# Patient Record
Sex: FEMALE | Race: WHITE | NOT HISPANIC OR LATINO | Employment: UNEMPLOYED | URBAN - METROPOLITAN AREA
[De-identification: names, ages, dates, MRNs, and addresses within clinical notes are randomized per-mention and may not be internally consistent; named-entity substitution may affect disease eponyms.]

---

## 2017-01-05 ENCOUNTER — APPOINTMENT (EMERGENCY)
Dept: RADIOLOGY | Facility: HOSPITAL | Age: 42
End: 2017-01-05
Payer: MEDICARE

## 2017-01-05 ENCOUNTER — HOSPITAL ENCOUNTER (EMERGENCY)
Facility: HOSPITAL | Age: 42
Discharge: HOME | End: 2017-01-05
Attending: EMERGENCY MEDICINE | Admitting: EMERGENCY MEDICINE
Payer: MEDICARE

## 2017-01-05 VITALS
WEIGHT: 188 LBS | RESPIRATION RATE: 20 BRPM | OXYGEN SATURATION: 99 % | HEIGHT: 67 IN | BODY MASS INDEX: 29.51 KG/M2 | SYSTOLIC BLOOD PRESSURE: 145 MMHG | HEART RATE: 91 BPM | DIASTOLIC BLOOD PRESSURE: 88 MMHG | TEMPERATURE: 98.9 F

## 2017-01-05 DIAGNOSIS — M54.50 LOW BACK PAIN: Primary | ICD-10-CM

## 2017-01-05 PROCEDURE — 72100 X-RAY EXAM L-S SPINE 2/3 VWS: CPT

## 2017-01-05 PROCEDURE — 99283 EMERGENCY DEPT VISIT LOW MDM: CPT

## 2017-01-05 RX ORDER — NAPROXEN 500 MG/1
500 TABLET ORAL
Qty: 60 TABLET | Refills: 0 | Status: SHIPPED | OUTPATIENT
Start: 2017-01-05 | End: 2018-04-23

## 2017-01-05 RX ORDER — MORPHINE SULFATE 15 MG/1
15 TABLET, FILM COATED, EXTENDED RELEASE ORAL 2 TIMES DAILY
COMMUNITY
End: 2018-04-23

## 2017-01-05 RX ORDER — OXYCODONE AND ACETAMINOPHEN 7.5; 325 MG/1; MG/1
1 TABLET ORAL EVERY 4 HOURS PRN
COMMUNITY
End: 2018-04-23

## 2017-01-05 RX ORDER — RIZATRIPTAN BENZOATE 10 MG/1
10 TABLET ORAL ONCE AS NEEDED
COMMUNITY
End: 2018-04-23

## 2017-01-05 RX ORDER — LIDOCAINE 50 MG/G
1 PATCH TOPICAL ONCE
Status: DISCONTINUED | OUTPATIENT
Start: 2017-01-05 | End: 2017-01-05 | Stop reason: HOSPADM

## 2017-01-05 RX ORDER — NAPROXEN 500 MG/1
500 TABLET ORAL ONCE
Status: COMPLETED | OUTPATIENT
Start: 2017-01-05 | End: 2017-01-05

## 2017-01-05 RX ORDER — IBUPROFEN 800 MG/1
800 TABLET ORAL EVERY 8 HOURS PRN
COMMUNITY
End: 2019-02-24

## 2017-01-05 RX ORDER — TRAZODONE HYDROCHLORIDE 100 MG/1
100 TABLET ORAL
COMMUNITY
End: 2019-04-12 | Stop reason: SDUPTHER

## 2017-01-05 RX ORDER — TIZANIDINE HYDROCHLORIDE 4 MG/1
4 CAPSULE, GELATIN COATED ORAL 3 TIMES DAILY
COMMUNITY
End: 2018-04-23

## 2017-01-05 RX ADMIN — NAPROXEN 500 MG: 500 TABLET ORAL at 14:29

## 2017-08-12 ENCOUNTER — HOSPITAL ENCOUNTER (EMERGENCY)
Facility: HOSPITAL | Age: 42
Discharge: HOME | End: 2017-08-12
Attending: EMERGENCY MEDICINE | Admitting: EMERGENCY MEDICINE
Payer: MEDICARE

## 2017-08-12 VITALS
HEART RATE: 101 BPM | HEIGHT: 67 IN | WEIGHT: 166 LBS | RESPIRATION RATE: 18 BRPM | BODY MASS INDEX: 26.06 KG/M2 | SYSTOLIC BLOOD PRESSURE: 134 MMHG | DIASTOLIC BLOOD PRESSURE: 72 MMHG | OXYGEN SATURATION: 99 % | TEMPERATURE: 98.7 F

## 2017-08-12 DIAGNOSIS — K08.89 TOOTH PAIN: ICD-10-CM

## 2017-08-12 DIAGNOSIS — R51.9 FACIAL PAIN: Primary | ICD-10-CM

## 2017-08-12 DIAGNOSIS — H92.09 EAR PAIN: ICD-10-CM

## 2017-08-12 PROCEDURE — 96372 THER/PROPH/DIAG INJ SC/IM: CPT

## 2017-08-12 PROCEDURE — 99283 EMERGENCY DEPT VISIT LOW MDM: CPT

## 2017-08-12 RX ORDER — CLINDAMYCIN HYDROCHLORIDE 150 MG/1
450 CAPSULE ORAL ONCE
Status: COMPLETED | OUTPATIENT
Start: 2017-08-12 | End: 2017-08-12

## 2017-08-12 RX ORDER — CLINDAMYCIN HYDROCHLORIDE 150 MG/1
450 CAPSULE ORAL EVERY 8 HOURS SCHEDULED
Qty: 90 CAPSULE | Refills: 0 | Status: SHIPPED | OUTPATIENT
Start: 2017-08-12 | End: 2017-08-22

## 2017-08-12 RX ORDER — KETOROLAC TROMETHAMINE 30 MG/ML
15 INJECTION, SOLUTION INTRAMUSCULAR; INTRAVENOUS ONCE
Status: COMPLETED | OUTPATIENT
Start: 2017-08-12 | End: 2017-08-12

## 2017-08-12 RX ORDER — CIPROFLOXACIN AND DEXAMETHASONE 3; 1 MG/ML; MG/ML
4 SUSPENSION/ DROPS AURICULAR (OTIC) 2 TIMES DAILY
Status: DISCONTINUED | OUTPATIENT
Start: 2017-08-12 | End: 2017-08-12 | Stop reason: HOSPADM

## 2017-08-12 RX ADMIN — CIPROFLOXACIN AND DEXAMETHASONE 4 DROP: 3; 1 SUSPENSION/ DROPS AURICULAR (OTIC) at 16:38

## 2017-08-12 RX ADMIN — KETOROLAC TROMETHAMINE 15 MG: 30 INJECTION, SOLUTION INTRAMUSCULAR at 16:12

## 2017-08-12 RX ADMIN — CLINDAMYCIN HYDROCHLORIDE 450 MG: 150 CAPSULE ORAL at 16:13

## 2017-10-09 ENCOUNTER — APPOINTMENT (EMERGENCY)
Dept: RADIOLOGY | Facility: HOSPITAL | Age: 42
End: 2017-10-09
Payer: MEDICARE

## 2017-10-09 ENCOUNTER — HOSPITAL ENCOUNTER (EMERGENCY)
Facility: HOSPITAL | Age: 42
Discharge: HOME | End: 2017-10-09
Attending: EMERGENCY MEDICINE
Payer: MEDICARE

## 2017-10-09 VITALS
TEMPERATURE: 97.4 F | HEART RATE: 88 BPM | DIASTOLIC BLOOD PRESSURE: 60 MMHG | RESPIRATION RATE: 16 BRPM | SYSTOLIC BLOOD PRESSURE: 104 MMHG | BODY MASS INDEX: 26 KG/M2 | WEIGHT: 166 LBS | OXYGEN SATURATION: 99 %

## 2017-10-09 DIAGNOSIS — K52.9 COLITIS: Primary | ICD-10-CM

## 2017-10-09 DIAGNOSIS — R10.9 ABDOMINAL PAIN: ICD-10-CM

## 2017-10-09 LAB
ALBUMIN SERPL BCP-MCNC: 3.5 G/DL (ref 3.5–5)
ALP SERPL-CCNC: 65 U/L (ref 46–116)
ALT SERPL W P-5'-P-CCNC: 14 U/L (ref 12–78)
AMORPH URATE CRY URNS QL MICRO: ABNORMAL /HPF
ANION GAP SERPL CALCULATED.3IONS-SCNC: 6 MMOL/L (ref 4–13)
APTT PPP: 32 SECONDS (ref 23–35)
AST SERPL W P-5'-P-CCNC: 11 U/L (ref 5–45)
BACTERIA UR QL AUTO: ABNORMAL /HPF
BASOPHILS # BLD AUTO: 0.02 THOUSANDS/ΜL (ref 0–0.1)
BASOPHILS NFR BLD AUTO: 0 % (ref 0–1)
BILIRUB SERPL-MCNC: 0.43 MG/DL (ref 0.2–1)
BILIRUB UR QL STRIP: ABNORMAL
BUN SERPL-MCNC: 18 MG/DL (ref 5–25)
CALCIUM SERPL-MCNC: 8.5 MG/DL (ref 8.3–10.1)
CHLORIDE SERPL-SCNC: 110 MMOL/L (ref 100–108)
CLARITY UR: CLEAR
CO2 SERPL-SCNC: 25 MMOL/L (ref 21–32)
COLOR UR: ABNORMAL
COLOR, POC: NORMAL
CREAT SERPL-MCNC: 0.83 MG/DL (ref 0.6–1.3)
EOSINOPHIL # BLD AUTO: 0.11 THOUSAND/ΜL (ref 0–0.61)
EOSINOPHIL NFR BLD AUTO: 1 % (ref 0–6)
ERYTHROCYTE [DISTWIDTH] IN BLOOD BY AUTOMATED COUNT: 14 % (ref 11.6–15.1)
GFR SERPL CREATININE-BSD FRML MDRD: 86 ML/MIN/1.73SQ M
GLUCOSE SERPL-MCNC: 85 MG/DL (ref 65–140)
GLUCOSE UR STRIP-MCNC: NEGATIVE MG/DL
HCT VFR BLD AUTO: 43.5 % (ref 34.8–46.1)
HGB BLD-MCNC: 14.6 G/DL (ref 11.5–15.4)
HGB UR QL STRIP.AUTO: ABNORMAL
INR PPP: 1.02 (ref 0.86–1.16)
KETONES UR STRIP-MCNC: NEGATIVE MG/DL
LEUKOCYTE ESTERASE UR QL STRIP: NEGATIVE
LIPASE SERPL-CCNC: 264 U/L (ref 73–393)
LYMPHOCYTES # BLD AUTO: 3.3 THOUSANDS/ΜL (ref 0.6–4.47)
LYMPHOCYTES NFR BLD AUTO: 31 % (ref 14–44)
MCH RBC QN AUTO: 31.9 PG (ref 26.8–34.3)
MCHC RBC AUTO-ENTMCNC: 33.6 G/DL (ref 31.4–37.4)
MCV RBC AUTO: 95 FL (ref 82–98)
MONOCYTES # BLD AUTO: 0.54 THOUSAND/ΜL (ref 0.17–1.22)
MONOCYTES NFR BLD AUTO: 5 % (ref 4–12)
NEUTROPHILS # BLD AUTO: 6.71 THOUSANDS/ΜL (ref 1.85–7.62)
NEUTS SEG NFR BLD AUTO: 63 % (ref 43–75)
NITRITE UR QL STRIP: NEGATIVE
NON-SQ EPI CELLS URNS QL MICRO: ABNORMAL /HPF
NRBC BLD AUTO-RTO: 0 /100 WBCS
PH UR STRIP.AUTO: 5.5 [PH] (ref 4.5–8)
PLATELET # BLD AUTO: 225 THOUSANDS/UL (ref 149–390)
PMV BLD AUTO: 9.6 FL (ref 8.9–12.7)
POTASSIUM SERPL-SCNC: 3.7 MMOL/L (ref 3.5–5.3)
PROT SERPL-MCNC: 7.1 G/DL (ref 6.4–8.2)
PROT UR STRIP-MCNC: ABNORMAL MG/DL
PROTHROMBIN TIME: 13.4 SECONDS (ref 12.1–14.4)
RBC # BLD AUTO: 4.58 MILLION/UL (ref 3.81–5.12)
RBC #/AREA URNS AUTO: ABNORMAL /HPF
SODIUM SERPL-SCNC: 141 MMOL/L (ref 136–145)
SP GR UR STRIP.AUTO: >=1.03 (ref 1–1.03)
UROBILINOGEN UR QL STRIP.AUTO: 0.2 E.U./DL
WBC # BLD AUTO: 10.71 THOUSAND/UL (ref 4.31–10.16)
WBC #/AREA URNS AUTO: ABNORMAL /HPF

## 2017-10-09 PROCEDURE — 83690 ASSAY OF LIPASE: CPT | Performed by: EMERGENCY MEDICINE

## 2017-10-09 PROCEDURE — 74177 CT ABD & PELVIS W/CONTRAST: CPT

## 2017-10-09 PROCEDURE — 80053 COMPREHEN METABOLIC PANEL: CPT | Performed by: EMERGENCY MEDICINE

## 2017-10-09 PROCEDURE — 85730 THROMBOPLASTIN TIME PARTIAL: CPT | Performed by: EMERGENCY MEDICINE

## 2017-10-09 PROCEDURE — 96360 HYDRATION IV INFUSION INIT: CPT

## 2017-10-09 PROCEDURE — 96361 HYDRATE IV INFUSION ADD-ON: CPT

## 2017-10-09 PROCEDURE — 36415 COLL VENOUS BLD VENIPUNCTURE: CPT | Performed by: EMERGENCY MEDICINE

## 2017-10-09 PROCEDURE — 81001 URINALYSIS AUTO W/SCOPE: CPT

## 2017-10-09 PROCEDURE — 99285 EMERGENCY DEPT VISIT HI MDM: CPT

## 2017-10-09 PROCEDURE — 85025 COMPLETE CBC W/AUTO DIFF WBC: CPT | Performed by: EMERGENCY MEDICINE

## 2017-10-09 PROCEDURE — 85610 PROTHROMBIN TIME: CPT | Performed by: EMERGENCY MEDICINE

## 2017-10-09 PROCEDURE — 94640 AIRWAY INHALATION TREATMENT: CPT

## 2017-10-09 PROCEDURE — 82272 OCCULT BLD FECES 1-3 TESTS: CPT

## 2017-10-09 PROCEDURE — 81002 URINALYSIS NONAUTO W/O SCOPE: CPT | Performed by: EMERGENCY MEDICINE

## 2017-10-09 RX ORDER — OXYCODONE HYDROCHLORIDE AND ACETAMINOPHEN 5; 325 MG/1; MG/1
1 TABLET ORAL ONCE
Status: COMPLETED | OUTPATIENT
Start: 2017-10-09 | End: 2017-10-09

## 2017-10-09 RX ORDER — DICYCLOMINE HCL 20 MG
20 TABLET ORAL EVERY 6 HOURS
Qty: 12 TABLET | Refills: 0 | Status: SHIPPED | OUTPATIENT
Start: 2017-10-09 | End: 2018-04-23

## 2017-10-09 RX ORDER — DICYCLOMINE HCL 20 MG
20 TABLET ORAL ONCE
Status: COMPLETED | OUTPATIENT
Start: 2017-10-09 | End: 2017-10-09

## 2017-10-09 RX ORDER — ALBUTEROL SULFATE 2.5 MG/3ML
5 SOLUTION RESPIRATORY (INHALATION) ONCE
Status: COMPLETED | OUTPATIENT
Start: 2017-10-09 | End: 2017-10-09

## 2017-10-09 RX ADMIN — DICYCLOMINE HYDROCHLORIDE 20 MG: 20 TABLET ORAL at 18:24

## 2017-10-09 RX ADMIN — IOHEXOL 100 ML: 350 INJECTION, SOLUTION INTRAVENOUS at 19:46

## 2017-10-09 RX ADMIN — SODIUM CHLORIDE 1000 ML: 0.9 INJECTION, SOLUTION INTRAVENOUS at 18:15

## 2017-10-09 RX ADMIN — IPRATROPIUM BROMIDE 0.5 MG: 0.5 SOLUTION RESPIRATORY (INHALATION) at 18:35

## 2017-10-09 RX ADMIN — OXYCODONE HYDROCHLORIDE AND ACETAMINOPHEN 1 TABLET: 5; 325 TABLET ORAL at 20:33

## 2017-10-09 RX ADMIN — ALBUTEROL SULFATE 5 MG: 2.5 SOLUTION RESPIRATORY (INHALATION) at 18:35

## 2017-10-09 NOTE — ED PROVIDER NOTES
History  Chief Complaint   Patient presents with    Rectal Bleeding     BRBPR after BM's since saturday     15-year-old female presents for evaluation of abdominal pain and rectal bleeding  Patient states that 2 days ago she had very hard stools which she had to strain to pass, after which, she developed bright red blood per rectum  Patient states that since then she has had 6-7 loose, bloody stools daily  Hematochezia seemed to have resolved today, but she has continued to have multiple loose stools  She states that she had initially had epigastric pain beginning 3 days ago, described as a tight sensation; however, patient's pain migrated to affect the lower abdomen bilaterally and radiating in a belt like fashion around her back today  She describes the lower abdominal pain as a tightness as well  She states that she is afraid to eat because this seems to exacerbate her pain  Patient states that she has had very little oral intake today  She reports chills for 3 days with subjective fever which seemed to result today as well  Patient has history of pulmonary nodules and smokes 1 ppd since the age of 13  She had been feeling short of breath, but states this has improved while lying down          History provided by:  Patient  Abdominal Pain   Pain location:  Epigastric, suprapubic, LLQ and RLQ  Pain radiates to:  Back  Pain severity:  Severe  Onset quality:  Gradual  Duration:  3 days  Timing:  Constant  Progression:  Waxing and waning  Chronicity:  New  Context: eating and previous surgery (hysterectomy)    Context: not diet changes, not recent illness, not retching and not trauma    Relieved by:  None tried  Worsened by:  Eating  Ineffective treatments:  None tried  Associated symptoms: chills, diarrhea (6-7 loose stools daily), fever (subjective) and shortness of breath    Associated symptoms: no chest pain, no constipation, no cough, no dysuria, no hematuria, no nausea, no sore throat and no vomiting Shortness of breath:     Severity:  Mild    Onset quality:  Unable to specify    Timing:  Intermittent    Progression:  Partially resolved  Risk factors: obesity        Prior to Admission Medications   Prescriptions Last Dose Informant Patient Reported? Taking? TiZANidine (ZANAFLEX) 4 MG capsule   Yes No   Sig: Take 4 mg by mouth 3 (three) times a day   ibuprofen (MOTRIN) 800 mg tablet   Yes No   Sig: Take 800 mg by mouth every 8 (eight) hours as needed for mild pain   morphine (MS CONTIN) 15 mg 12 hr tablet   Yes No   Sig: Take 15 mg by mouth 2 (two) times a day   naproxen (NAPROSYN) 500 mg tablet   No No   Sig: Take 1 tablet by mouth 3 (three) times a day with meals for 30 days   oxyCODONE-acetaminophen (PERCOCET) 7 5-325 MG per tablet   Yes No   Sig: Take 1 tablet by mouth every 4 (four) hours as needed for moderate pain   rizatriptan (MAXALT) 10 MG tablet   Yes No   Sig: Take 10 mg by mouth once as needed for migraine May repeat in 2 hours if needed   traZODone (DESYREL) 100 mg tablet   Yes No   Sig: Take 100 mg by mouth daily at bedtime Take two tablets daily at bedtime  Facility-Administered Medications: None       Past Medical History:   Diagnosis Date    Back pain     Chronic pain     Migraine        History reviewed  No pertinent surgical history  History reviewed  No pertinent family history  I have reviewed and agree with the history as documented  Social History   Substance Use Topics    Smoking status: Current Every Day Smoker     Packs/day: 1 00     Types: Cigarettes    Smokeless tobacco: Never Used    Alcohol use Yes      Comment: socially         Review of Systems   Constitutional: Positive for appetite change, chills and fever (subjective)  HENT: Negative for congestion, rhinorrhea and sore throat  Respiratory: Positive for shortness of breath  Negative for cough and chest tightness  Cardiovascular: Negative for chest pain, palpitations and leg swelling  Gastrointestinal: Positive for abdominal pain and diarrhea (6-7 loose stools daily)  Negative for constipation, nausea and vomiting  Genitourinary: Negative for dysuria, frequency and hematuria  Musculoskeletal: Negative for myalgias, neck pain and neck stiffness  Skin: Negative for pallor and rash  Neurological: Negative for syncope, weakness and headaches  All other systems reviewed and are negative  Physical Exam  ED Triage Vitals [10/09/17 1742]   Temperature Pulse Respirations Blood Pressure SpO2   (!) 97 4 °F (36 3 °C) 103 16 123/70 96 %      Temp Source Heart Rate Source Patient Position - Orthostatic VS BP Location FiO2 (%)   Oral -- -- -- --      Pain Score       6           Physical Exam   Constitutional: She is oriented to person, place, and time  She appears well-developed and well-nourished  Non-toxic appearance  No distress  HENT:   Head: Normocephalic and atraumatic  Eyes: Conjunctivae and EOM are normal  Pupils are equal, round, and reactive to light  Neck: Normal range of motion  Neck supple  No tracheal deviation present  No thyromegaly present  Cardiovascular: Normal rate, regular rhythm, normal heart sounds and intact distal pulses  Pulmonary/Chest: Effort normal  She has wheezes in the right upper field and the right middle field  Abdominal: Soft  Bowel sounds are normal  She exhibits no distension  There is generalized tenderness  There is no rigidity, no rebound, no guarding, no CVA tenderness, no tenderness at McBurney's point and negative Ochoa's sign  Rectal tenderness on exam  No palpable internal hemorrhoid  No visible external fissure or hemorrhoid  Genitourinary: Rectal exam shows guaiac positive stool  Musculoskeletal: She exhibits no edema  Lymphadenopathy:     She has no cervical adenopathy  Neurological: She is alert and oriented to person, place, and time  Skin: Skin is warm and dry  She is not diaphoretic     Psychiatric: She has a normal mood and affect  Her behavior is normal  Judgment and thought content normal    Nursing note and vitals reviewed        ED Medications  Medications   oxyCODONE-acetaminophen (PERCOCET) 5-325 mg per tablet 1 tablet (not administered)   dicyclomine (BENTYL) tablet 20 mg (20 mg Oral Given 10/9/17 1824)   sodium chloride 0 9 % bolus 1,000 mL (1,000 mL Intravenous New Bag 10/9/17 1815)   ipratropium (ATROVENT) 0 02 % inhalation solution 0 5 mg (0 5 mg Nebulization Given 10/9/17 1835)   albuterol inhalation solution 5 mg (5 mg Nebulization Given 10/9/17 1835)   iohexol (OMNIPAQUE) 350 MG/ML injection (MULTI-DOSE) 100 mL (100 mL Intravenous Given 10/9/17 1946)       Diagnostic Studies  Labs Reviewed   CBC AND DIFFERENTIAL - Abnormal        Result Value Ref Range Status    WBC 10 71 (*) 4 31 - 10 16 Thousand/uL Final    RBC 4 58  3 81 - 5 12 Million/uL Final    Hemoglobin 14 6  11 5 - 15 4 g/dL Final    Hematocrit 43 5  34 8 - 46 1 % Final    MCV 95  82 - 98 fL Final    MCH 31 9  26 8 - 34 3 pg Final    MCHC 33 6  31 4 - 37 4 g/dL Final    RDW 14 0  11 6 - 15 1 % Final    MPV 9 6  8 9 - 12 7 fL Final    Platelets 405  712 - 390 Thousands/uL Final    nRBC 0  /100 WBCs Final    Neutrophils Relative 63  43 - 75 % Final    Lymphocytes Relative 31  14 - 44 % Final    Monocytes Relative 5  4 - 12 % Final    Eosinophils Relative 1  0 - 6 % Final    Basophils Relative 0  0 - 1 % Final    Neutrophils Absolute 6 71  1 85 - 7 62 Thousands/µL Final    Lymphocytes Absolute 3 30  0 60 - 4 47 Thousands/µL Final    Monocytes Absolute 0 54  0 17 - 1 22 Thousand/µL Final    Eosinophils Absolute 0 11  0 00 - 0 61 Thousand/µL Final    Basophils Absolute 0 02  0 00 - 0 10 Thousands/µL Final   COMPREHENSIVE METABOLIC PANEL - Abnormal     Chloride 110 (*) 100 - 108 mmol/L Final    Sodium 141  136 - 145 mmol/L Final    Potassium 3 7  3 5 - 5 3 mmol/L Final    CO2 25  21 - 32 mmol/L Final    Anion Gap 6  4 - 13 mmol/L Final    BUN 18  5 - 25 mg/dL Final    Creatinine 0 83  0 60 - 1 30 mg/dL Final    Comment: Standardized to IDMS reference method    Glucose 85  65 - 140 mg/dL Final    Comment:   If the patient is fasting, the ADA then defines impaired fasting glucose as > 100 mg/dL and diabetes as > or equal to 123 mg/dL  Specimen collection should occur prior to Sulfasalazine administration due to the potential for falsely depressed results  Specimen collection should occur prior to Sulfapyridine administration due to the potential for falsely elevated results  Calcium 8 5  8 3 - 10 1 mg/dL Final    AST 11  5 - 45 U/L Final    Comment:   Specimen collection should occur prior to Sulfasalazine administration due to the potential for falsely depressed results  ALT 14  12 - 78 U/L Final    Comment:   Specimen collection should occur prior to Sulfasalazine and/or Sulfapyridine administration due to the potential for falsely depressed results  Alkaline Phosphatase 65  46 - 116 U/L Final    Total Protein 7 1  6 4 - 8 2 g/dL Final    Albumin 3 5  3 5 - 5 0 g/dL Final    Total Bilirubin 0 43  0 20 - 1 00 mg/dL Final    eGFR 86  ml/min/1 73sq m Final    Narrative:     National Kidney Disease Education Program recommendations are as follows:  GFR calculation is accurate only with a steady state creatinine  Chronic Kidney disease less than 60 ml/min/1 73 sq  meters  Kidney failure less than 15 ml/min/1 73 sq  meters  ED URINE MACROSCOPIC - Abnormal     Protein, UA Trace (*) Negative mg/dl Final    Bilirubin, UA Interference- unable to analyze (*) Negative Final    Comment: The dipstick result may be falsely positive do to interfering substances  We recommend reliance upon serum bilirubin, liver & kidney function tests to guide patient care if clinically indicated      Blood, UA Moderate (*) Negative Final    Color, UA Isadora   Final    Clarity, UA Clear   Final    pH, UA 5 5  4 5 - 8 0 Final    Leukocytes, UA Negative  Negative Final    Nitrite, UA Negative  Negative Final    Glucose, UA Negative  Negative mg/dl Final    Ketones, UA Negative  Negative mg/dl Final    Urobilinogen, UA 0 2  0 2, 1 0 E U /dl E U /dl Final    Specific Gravity, UA >=1 030  1 003 - 1 030 Final    Narrative:     CLINITEK RESULT   PROTIME-INR - Normal    Protime 13 4  12 1 - 14 4 seconds Final    INR 1 02  0 86 - 1 16 Final   APTT - Normal    PTT 32  23 - 35 seconds Final    Narrative: Therapeutic Heparin Range = 60-90 seconds   LIPASE - Normal    Lipase 264  73 - 393 u/L Final   POCT URINALYSIS DIPSTICK - Normal    Color, UA see results   Final   URINE MICROSCOPIC       CT abdomen pelvis with contrast   Final Result   1  Diffuse wall thickening with adjacent inflammatory change extending from the right colon to the transverse, left and sigmoid colon colon, compatible with colitis  Workstation performed: [id number]             Procedures  Procedures      Phone Consults  ED Phone Contact    ED Course  ED Course                                MDM  Number of Diagnoses or Management Options  Abdominal pain: new and requires workup  Colitis: new and requires workup  Diagnosis management comments: 26-year-old female presents for evaluation of abdominal pain and rectal bleeding for the past 3 days  Symptoms seem to be improving  Significant tenderness on rectal examination; however, no external signs of hemorrhoid  Possible internal fissure  Patient has diffuse abdominal tenderness  Bentyl for pain  Nebulizer treatment for shortness of breath and wheezing  Laboratory evaluation unremarkable  CT of the abdomen and pelvis reveals colitis  Will treat with a single dose of Percocet while in the emergency department and discharged prescription for Bentyl  Patient to follow up with GI for further evaluation and management         Amount and/or Complexity of Data Reviewed  Clinical lab tests: ordered and reviewed  Tests in the radiology section of CPT®: ordered and reviewed    Patient Progress  Patient progress: stable    CritCare Time    Disposition  Final diagnoses:   Colitis   Abdominal pain     ED Disposition     ED Disposition Condition Comment    Discharge  Artur Roberts discharge to home/self care  Condition at discharge: Stable        Follow-up Information     Follow up With Specialties Details Why Contact Info    [facility] [place]  Schedule an appointment as soon as possible for a visit in 3 days for re-evaluation of your colitis [address] [place]  [phone]        Patient's Medications   Discharge Prescriptions    DICYCLOMINE (BENTYL) 20 MG TABLET    Take 1 tablet by mouth every 6 (six) hours       Start Date: 10/9/2017 End Date: --       Order Dose: 20 mg       Quantity: 12 tablet    Refills: 0     No discharge procedures on file  ED Provider  Attending physically available and evaluated Artur Roberts I managed the patient along with the ED Attending      Electronically Signed by       Fernando Alonzo MD  Resident  10/09/17 2025

## 2017-10-10 NOTE — DISCHARGE INSTRUCTIONS
Colitis   WHAT YOU NEED TO KNOW:   Colitis is swelling and irritation of your colon  Colitis may be caused by ulcers or a problem with your immune system  Bacteria, a virus, or a parasite may also cause colitis  The cause may not be known  You may have diarrhea, abdominal pain, fever, or blood or mucus in your bowel movement  DISCHARGE INSTRUCTIONS:   Return to the emergency department if:   · You have sudden trouble breathing  · Your bowel movements are black or have blood in them  · You have blood in your vomit  · You have severe abdominal pain or your abdomen is swollen and feels hard  · You have any of the following signs of dehydration:     ¨ Dizziness or weakness    ¨ Dry mouth, cracked lips, or severe thirst    ¨ Fast heartbeat or breathing    ¨ Urinating very little or not at all  Contact your healthcare provider if:   · Your symptoms get worse or do not go away  · You have a fever, chills, cough, or feel weak and achy  · You suddenly lose weight without trying  · You have questions or concerns about your condition or care  Medicines:   · Medicines  may be given to decrease inflammation in your colon and treat diarrhea  · Take your medicine as directed  Contact your healthcare provider if you think your medicine is not helping or if you have side effects  Tell him of her if you are allergic to any medicine  Keep a list of the medicines, vitamins, and herbs you take  Include the amounts, and when and why you take them  Bring the list or the pill bottles to follow-up visits  Carry your medicine list with you in case of an emergency  Manage your symptoms:   · Drink liquids as directed  to help prevent dehydration  Good liquids to drink include water, juice, and broth  Ask how much liquid to drink each day  You may need to drink an oral rehydration solution (ORS)  An ORS contains a balance of water, salt, and sugar to replace body fluids lost during diarrhea       · Eat a variety of healthy foods  Healthy foods include fruits, vegetables, whole-grain breads, beans, low-fat dairy products, lean meats, and fish  You may need to eat several small meals throughout the day instead of large meals  Avoid spicy foods, caffeine, chocolate, and foods high in fat  · Talk to your healthcare provider before you take NSAIDs  NSAIDs can cause worsen your symptoms if ulcers are causing your colitis  · Start to exercise when you feel better  Regular exercise helps your bowels work normally  Ask about the best exercise plan for you  Follow up with your healthcare provider as directed: You may need to return for a colonoscopy or other tests  Write down how often you have a bowel movements and what they look like  Bring this to your follow-up visits  Write down your questions so you remember to ask them during your visits  © 2017 [address] [employer] Information is for End User's use only and may not be sold, redistributed or otherwise used for commercial purposes  All illustrations and images included in CareNotes® are the copyrighted property of [employer]  or [employer]  The above information is an  only  It is not intended as medical advice for individual conditions or treatments  Talk to your doctor, nurse or pharmacist before following any medical regimen to see if it is safe and effective for you

## 2017-10-18 NOTE — ED ATTENDING ATTESTATION
Trish Arita MD, saw and evaluated the patient  I have discussed the patient with the resident/non-physician practitioner and agree with the resident's/non-physician practitioner's findings, Plan of Care, and MDM as documented in the resident's/non-physician practitioner's note, except where noted  All available labs and Radiology studies were reviewed  At this point I agree with the current assessment done in the Emergency Department  I have conducted an independent evaluation of this patient a history and physical is as follows:    Patient presents with complaints of hematochezia  Patient states that 2 days ago she had hard stools and after that developed bright red blood per rectum  Patient states that since that time, she has had some loose stools  She does report that the bleeding has improved but the pain persists  No additional complaints  Exam: AAOx3, NAD, RRR, CTA, lower abdominal tenderness to palpation  A/P:  Abdominal pain  Will check bili levels, urine, and CT abdomen pelvis to evaluate for any abnormality      Critical Care Time  CritCare Time

## 2018-04-23 ENCOUNTER — HOSPITAL ENCOUNTER (EMERGENCY)
Facility: HOSPITAL | Age: 43
Discharge: HOME | End: 2018-04-23
Attending: EMERGENCY MEDICINE | Admitting: EMERGENCY MEDICINE
Payer: COMMERCIAL

## 2018-04-23 ENCOUNTER — TRANSCRIBE ORDERS (OUTPATIENT)
Dept: ADMINISTRATIVE | Facility: HOSPITAL | Age: 43
End: 2018-04-23

## 2018-04-23 ENCOUNTER — APPOINTMENT (EMERGENCY)
Dept: RADIOLOGY | Facility: HOSPITAL | Age: 43
End: 2018-04-23
Payer: COMMERCIAL

## 2018-04-23 VITALS
DIASTOLIC BLOOD PRESSURE: 77 MMHG | RESPIRATION RATE: 17 BRPM | SYSTOLIC BLOOD PRESSURE: 154 MMHG | TEMPERATURE: 98.3 F | BODY MASS INDEX: 29.66 KG/M2 | HEART RATE: 93 BPM | HEIGHT: 67 IN | OXYGEN SATURATION: 98 % | WEIGHT: 189 LBS

## 2018-04-23 DIAGNOSIS — S93.402A LEFT ANKLE SPRAIN: Primary | ICD-10-CM

## 2018-04-23 DIAGNOSIS — Z12.39 SCREENING BREAST EXAMINATION: Primary | ICD-10-CM

## 2018-04-23 PROCEDURE — 99283 EMERGENCY DEPT VISIT LOW MDM: CPT

## 2018-04-23 PROCEDURE — 73610 X-RAY EXAM OF ANKLE: CPT

## 2018-04-23 RX ORDER — NAPROXEN 500 MG/1
500 TABLET ORAL 2 TIMES DAILY WITH MEALS
Qty: 30 TABLET | Refills: 0 | Status: SHIPPED | OUTPATIENT
Start: 2018-04-23 | End: 2019-02-24

## 2018-04-24 NOTE — ED PROVIDER NOTES
History  Chief Complaint   Patient presents with    Fall     Pt  reports, "I fell, and I rolled my foot somehow  Now, there's pain in the left foot and it's shooting up my leg "     HPI     Rolled left ankle while  Walking, inversion  Can bear weight  No other injuries  No previous surgery  No thinners  Aching, constant  Moderate severity in ankle  No 5th mtp tenderness, proximal fibula/tibia tenderness  A/P: ankle injury  Xray  Prior to Admission Medications   Prescriptions Last Dose Informant Patient Reported? Taking?   ibuprofen (MOTRIN) 800 mg tablet   Yes Yes   Sig: Take 800 mg by mouth every 8 (eight) hours as needed for mild pain   traZODone (DESYREL) 100 mg tablet   Yes Yes   Sig: Take 100 mg by mouth daily at bedtime Take two tablets daily at bedtime  Facility-Administered Medications: None       Past Medical History:   Diagnosis Date    Back pain     Chronic pain     Migraine        Past Surgical History:   Procedure Laterality Date    HYSTERECTOMY         History reviewed  No pertinent family history  I have reviewed and agree with the history as documented  Social History   Substance Use Topics    Smoking status: Current Every Day Smoker     Packs/day: 0 20     Types: Cigarettes    Smokeless tobacco: Never Used    Alcohol use Yes      Comment: socially         Review of Systems   Constitutional: Negative for diaphoresis, fatigue and fever  HENT: Negative for facial swelling and nosebleeds  Eyes: Negative for pain and visual disturbance  Respiratory: Negative for apnea, cough, shortness of breath and wheezing  Cardiovascular: Negative for chest pain and leg swelling  Gastrointestinal: Negative for abdominal distention, abdominal pain, anal bleeding, blood in stool, nausea, rectal pain and vomiting  Genitourinary: Negative for difficulty urinating, dysuria and flank pain  Musculoskeletal: Positive for arthralgias and joint swelling   Negative for back pain, neck pain and neck stiffness  Neurological: Negative for dizziness, syncope, weakness, light-headedness and headaches  All other systems reviewed and are negative  Physical Exam  ED Triage Vitals [04/23/18 2112]   Temperature Pulse Respirations Blood Pressure SpO2   98 3 °F (36 8 °C) 93 17 154/77 98 %      Temp Source Heart Rate Source Patient Position - Orthostatic VS BP Location FiO2 (%)   Oral Monitor Sitting Left arm --      Pain Score       7           Orthostatic Vital Signs  Vitals:    04/23/18 2112   BP: 154/77   Pulse: 93   Patient Position - Orthostatic VS: Sitting       Physical Exam   Constitutional: She is oriented to person, place, and time  She appears well-developed and well-nourished  No distress  HENT:   Head: Normocephalic and atraumatic  Nose: Nose normal    Eyes: Conjunctivae and EOM are normal  Pupils are equal, round, and reactive to light  No scleral icterus  Neck: Normal range of motion  Neck supple  No JVD present  No tracheal deviation present  No thyromegaly present  Cardiovascular: Normal rate, regular rhythm, normal heart sounds and intact distal pulses  Exam reveals no gallop and no friction rub  Pulmonary/Chest: Effort normal and breath sounds normal  No respiratory distress  She has no wheezes  She has no rales  She exhibits no tenderness  Abdominal: Soft  Bowel sounds are normal  She exhibits no distension and no mass  There is no tenderness  There is no rebound and no guarding  No hernia  Musculoskeletal: Normal range of motion  She exhibits no edema, tenderness or deformity  Neurological: She is alert and oriented to person, place, and time  She has normal reflexes  No cranial nerve deficit  Coordination normal    Skin: Skin is warm and dry  She is not diaphoretic  No erythema  Psychiatric: She has a normal mood and affect  Her behavior is normal    Nursing note and vitals reviewed        ED Medications  Medications - No data to display    Diagnostic Studies  Results Reviewed     None                 XR ankle 3+ views LEFT   ED Interpretation by Jose Eduardo Nazario DO (04/23 2157)   No acute fracture or dislocation      Final Result by Madeleine Valdivia MD (04/24 6925)      No acute osseous abnormality  Workstation performed: [id number]               Procedures  Procedures      Phone Consults  ED Phone Contact    ED Course                               MDM  Number of Diagnoses or Management Options  Left ankle sprain: new and requires workup     Amount and/or Complexity of Data Reviewed  Tests in the radiology section of CPT®: reviewed and ordered  Independent visualization of images, tracings, or specimens: yes      CritCare Time    Disposition  Final diagnoses:   Left ankle sprain     Time reflects when diagnosis was documented in both MDM as applicable and the Disposition within this note     Time User Action Codes Description Comment    4/23/2018  9:58 PM Pranay Rm Add [[id number]] Left ankle sprain       ED Disposition     ED Disposition Condition Comment    Discharge  Jaja Mock discharge to home/self care  Condition at discharge: Good        Follow-up Information     Follow up With Specialties Details Why Contact Info    Kevin Joy MD [place] [address] [address]  [phone]          Discharge Medication List as of 4/23/2018  9:59 PM      CONTINUE these medications which have CHANGED    Details   naproxen (NAPROSYN) 500 mg tablet Take 1 tablet (500 mg total) by mouth 2 (two) times a day with meals, Starting Mon 4/23/2018, Normal         CONTINUE these medications which have NOT CHANGED    Details   ibuprofen (MOTRIN) 800 mg tablet Take 800 mg by mouth every 8 (eight) hours as needed for mild pain, Until Discontinued, Historical Med      traZODone (DESYREL) 100 mg tablet Take 100 mg by mouth daily at bedtime Take two tablets daily at bedtime  , Until Discontinued, Historical Med      dicyclomine (BENTYL) 20 mg tablet Take 1 tablet by mouth every 6 (six) hours, Starting Mon 10/9/2017, Print      morphine (MS CONTIN) 15 mg 12 hr tablet Take 15 mg by mouth 2 (two) times a day, Until Discontinued, Historical Med      oxyCODONE-acetaminophen (PERCOCET) 7 5-325 MG per tablet Take 1 tablet by mouth every 4 (four) hours as needed for moderate pain, Until Discontinued, Historical Med      rizatriptan (MAXALT) 10 MG tablet Take 10 mg by mouth once as needed for migraine May repeat in 2 hours if needed, Until Discontinued, Historical Med      TiZANidine (ZANAFLEX) 4 MG capsule Take 4 mg by mouth 3 (three) times a day, Until Discontinued, Historical Med           No discharge procedures on file  ED Provider  Attending physically available and evaluated Hemant Buchanan I managed the patient along with the ED Attending      Electronically Signed by         Samir Parham DO  04/28/18 0823

## 2018-04-24 NOTE — DISCHARGE INSTRUCTIONS

## 2018-04-24 NOTE — ED ATTENDING ATTESTATION
Pinky Huffman DO, saw and evaluated the patient  I have discussed the patient with the resident/non-physician practitioner and agree with the resident's/non-physician practitioner's findings, Plan of Care, and MDM as documented in the resident's/non-physician practitioner's note, except where noted  All available labs and Radiology studies were reviewed  At this point I agree with the current assessment done in the Emergency Department  I have conducted an independent evaluation of this patient a history and physical is as follows:    Patient is a 47-year-old female who fell sustaining an inversion injury to her left ankle  Patient complains of pain in the lateral malleolus with some mild radiation cephalad  There is no proximal fibular tenderness, no calf pain tenderness or asymmetry  She does have tenderness on the lateral malleolus and along the anterior talofibular ligament region  Able bear weight but is painful  Mild swelling but no ecchymosis noted  The x-ray of the left ankle was negative for fracture, dislocation  Will place and ankle air cast, rest, NSAIDs, follow up with Orthopedics/primary care physician, return if worsens      Critical Care Time  CritCare Time    Procedures

## 2019-02-24 ENCOUNTER — APPOINTMENT (EMERGENCY)
Dept: RADIOLOGY | Facility: HOSPITAL | Age: 44
End: 2019-02-24
Payer: COMMERCIAL

## 2019-02-24 ENCOUNTER — HOSPITAL ENCOUNTER (EMERGENCY)
Facility: HOSPITAL | Age: 44
Discharge: HOME | End: 2019-02-24
Attending: EMERGENCY MEDICINE
Payer: COMMERCIAL

## 2019-02-24 VITALS
HEART RATE: 84 BPM | SYSTOLIC BLOOD PRESSURE: 163 MMHG | OXYGEN SATURATION: 97 % | DIASTOLIC BLOOD PRESSURE: 91 MMHG | WEIGHT: 200 LBS | TEMPERATURE: 98.5 F | BODY MASS INDEX: 31.32 KG/M2 | RESPIRATION RATE: 18 BRPM

## 2019-02-24 DIAGNOSIS — R20.2 PARESTHESIAS: ICD-10-CM

## 2019-02-24 DIAGNOSIS — M25.532 LEFT WRIST PAIN: Primary | ICD-10-CM

## 2019-02-24 PROCEDURE — 99283 EMERGENCY DEPT VISIT LOW MDM: CPT

## 2019-02-24 PROCEDURE — 73110 X-RAY EXAM OF WRIST: CPT

## 2019-02-24 RX ORDER — PREDNISONE 20 MG/1
TABLET ORAL
Qty: 8 TABLET | Refills: 0 | Status: SHIPPED | OUTPATIENT
Start: 2019-02-24 | End: 2019-04-12 | Stop reason: ALTCHOICE

## 2019-02-24 RX ORDER — IBUPROFEN 800 MG/1
800 TABLET ORAL 3 TIMES DAILY
Qty: 30 TABLET | Refills: 0 | Status: SHIPPED | OUTPATIENT
Start: 2019-02-24 | End: 2019-04-12

## 2019-02-24 RX ORDER — IBUPROFEN 600 MG/1
600 TABLET ORAL ONCE
Status: COMPLETED | OUTPATIENT
Start: 2019-02-24 | End: 2019-02-24

## 2019-02-24 RX ADMIN — IBUPROFEN 600 MG: 600 TABLET ORAL at 10:35

## 2019-02-24 NOTE — ED PROVIDER NOTES
History  Chief Complaint   Patient presents with    Wrist Pain     Patient states "I have fibromyalgia, about 4 days ago I started to notice my L wrist hurting, now in both arms Im noticing a pain and tingling that goes from my elbow to my finger tips " Patient states tingling goes to all finger tips  Patient is a 75-year-old with a history of fibromyalgia who states that 4 days ago she started with intermittent left wrist pain, also intermittent tingling from both elbows to finger tips  Presents today concerned as the left wrist pain is now constant  She denies known injury or trauma  She works as  so she does a lot of typing  She has not taken anything for the pain as of yet  She has a history of fibromyalgia and has taken both prescription strength ibuprofen and naproxen in the past, but prefers ibuprofen  LMP hysterectomy  Prior to Admission Medications   Prescriptions Last Dose Informant Patient Reported? Taking?   traZODone (DESYREL) 100 mg tablet 2/23/2019 at Unknown time  Yes Yes   Sig: Take 100 mg by mouth daily at bedtime Take two tablets daily at bedtime  Facility-Administered Medications: None       Past Medical History:   Diagnosis Date    Back pain     Chronic pain     Migraine        Past Surgical History:   Procedure Laterality Date    HYSTERECTOMY         History reviewed  No pertinent family history  I have reviewed and agree with the history as documented  Social History     Tobacco Use    Smoking status: Current Every Day Smoker     Packs/day: 0 20     Types: Cigarettes    Smokeless tobacco: Never Used   Substance Use Topics    Alcohol use: Not Currently     Comment: socially     Drug use: No        Review of Systems   Constitutional: Negative for chills and fever  HENT: Negative for congestion, rhinorrhea, sinus pain and sore throat  Respiratory: Negative for cough, shortness of breath and wheezing      Cardiovascular: Negative for chest pain  Gastrointestinal: Negative for abdominal pain, diarrhea, nausea and vomiting  Musculoskeletal: Positive for arthralgias (left wrist pain, intermittent tingling b/l elbows to fingertips)  Negative for myalgias  Neurological: Negative for dizziness and headaches  All other systems reviewed and are negative  Physical Exam  Physical Exam   Constitutional: She is oriented to person, place, and time  She appears well-developed and well-nourished  HENT:   Head: Normocephalic and atraumatic  Right Ear: External ear normal    Left Ear: External ear normal    Nose: Nose normal    Mouth/Throat: Oropharynx is clear and moist    Neck: Normal range of motion  Neck supple  Cardiovascular: Normal rate, regular rhythm and normal heart sounds  Pulmonary/Chest: Effort normal and breath sounds normal    Musculoskeletal: Normal range of motion  Right shoulder: Normal  She exhibits normal range of motion, no tenderness, no bony tenderness, no pain, no spasm, normal pulse and normal strength  Left shoulder: Normal  She exhibits normal range of motion, no tenderness, no bony tenderness, no pain, no spasm, normal pulse and normal strength  Right elbow: Normal She exhibits normal range of motion  No tenderness found  Left elbow: Normal  She exhibits normal range of motion  No tenderness found  Right wrist: Normal  She exhibits normal range of motion, no tenderness and no bony tenderness  Left wrist: She exhibits tenderness (laterally, no overlying erythema, edema  Good pulses  Sensation and motor function intact ) and bony tenderness  Neurological: She is alert and oriented to person, place, and time  Skin: Skin is warm and dry  Capillary refill takes less than 2 seconds         Vital Signs  ED Triage Vitals [02/24/19 1003]   Temperature Pulse Respirations Blood Pressure SpO2   98 5 °F (36 9 °C) 84 18 163/91 97 %      Temp Source Heart Rate Source Patient Position - Orthostatic VS BP Location FiO2 (%)   Oral Monitor Sitting Right arm --      Pain Score       7           Vitals:    02/24/19 1003   BP: 163/91   Pulse: 84   Patient Position - Orthostatic VS: Sitting       Visual Acuity      ED Medications  Medications   ibuprofen (MOTRIN) tablet 600 mg (600 mg Oral Given 2/24/19 1035)       Diagnostic Studies  Results Reviewed     None                 XR wrist 3+ views LEFT   ED Interpretation by Augustin Favre, PA-C (02/24 1054)   Negative for fracture  Procedures  Procedures       Phone Contacts  ED Phone Contact    ED Course                               MDM  Number of Diagnoses or Management Options  Left wrist pain:   Paresthesias:   Diagnosis management comments:  with intermittent wrist pain and tingling bilateral upper extremities likely related to overuse/possible carpal tunnel syndrome component  Will x-ray left wrist to rule out fracture or other pathology  X-ray is negative for fracture  Plan is to prescribe ibuprofen and prednisone for inflammation, recommend cock-up splints and keyboard pad and have patient follow up with Sports Medicine or Ortho for further evaluation and treatment if symptoms persist  Both verbal and written discharge instructions provided  Adequate time was allowed to answer any questions  Recommend follow up with family doctor or referral physician as discussed, sooner if symptoms persist, change or worsen  Red flag symptoms as well as reasons to return to the ED discussed  Pt verbally understood treatment plan and was discharged home in stable condition          Disposition  Final diagnoses:   Left wrist pain   Paresthesias     Time reflects when diagnosis was documented in both MDM as applicable and the Disposition within this note     Time User Action Codes Description Comment    2/24/2019 10:56 AM Marine Jacobson [M25 532] Left wrist pain     2/24/2019 10:56 AM Marine VALADEZ Add [R20 2] Paresthesias       ED Disposition     ED Disposition Condition Date/Time Comment    Discharge Stable Sun Feb 24, 2019 10:56 AM Gita Real discharge to home/self care  Follow-up Information     Follow up With Specialties Details Why Contact Info Additional Information    [facility]  Call  Call and establish with new PCP   [facility]  Schedule an appointment as soon as possible for a visit   [address] [phone] [facility], [address] [place] [address] [address] , [place], [place], [address] [place] [address] Orthopedic Surgery   [address] [phone]  [address], [address] [place] [address], [phone]          Patient's Medications   Discharge Prescriptions    IBUPROFEN (MOTRIN) 800 MG TABLET    Take 1 tablet (800 mg total) by mouth 3 (three) times a day for 10 days As needed with food       Start Date: 2/24/2019 End Date: 3/6/2019       Order Dose: 800 mg       Quantity: 30 tablet    Refills: 0    PREDNISONE 20 MG TABLET    Take 2 tablets together once daily in am with food       Start Date: 2/24/2019 End Date: --       Order Dose: --       Quantity: 8 tablet    Refills: 0     Outpatient Discharge Orders   Cock Up Wrist Splint       ED Provider  Electronically Signed by           Shanice Raines PA-C  02/24/19 1396

## 2019-04-01 ENCOUNTER — OFFICE VISIT (OUTPATIENT)
Dept: OBGYN CLINIC | Facility: OTHER | Age: 44
End: 2019-04-01
Payer: COMMERCIAL

## 2019-04-01 VITALS
DIASTOLIC BLOOD PRESSURE: 78 MMHG | HEIGHT: 67 IN | SYSTOLIC BLOOD PRESSURE: 138 MMHG | BODY MASS INDEX: 32.96 KG/M2 | WEIGHT: 210 LBS | HEART RATE: 99 BPM

## 2019-04-01 DIAGNOSIS — M25.532 PAIN IN LEFT WRIST: ICD-10-CM

## 2019-04-01 DIAGNOSIS — M79.7 FIBROMYALGIA: ICD-10-CM

## 2019-04-01 DIAGNOSIS — M65.4 TENDINITIS, DE QUERVAIN'S: Primary | ICD-10-CM

## 2019-04-01 PROCEDURE — 20550 NJX 1 TENDON SHEATH/LIGAMENT: CPT | Performed by: ORTHOPAEDIC SURGERY

## 2019-04-01 PROCEDURE — 99203 OFFICE O/P NEW LOW 30 MIN: CPT | Performed by: ORTHOPAEDIC SURGERY

## 2019-04-01 RX ORDER — LIDOCAINE HYDROCHLORIDE 10 MG/ML
2 INJECTION, SOLUTION INFILTRATION; PERINEURAL
Status: COMPLETED | OUTPATIENT
Start: 2019-04-01 | End: 2019-04-01

## 2019-04-01 RX ORDER — BETAMETHASONE SODIUM PHOSPHATE AND BETAMETHASONE ACETATE 3; 3 MG/ML; MG/ML
6 INJECTION, SUSPENSION INTRA-ARTICULAR; INTRALESIONAL; INTRAMUSCULAR; SOFT TISSUE
Status: COMPLETED | OUTPATIENT
Start: 2019-04-01 | End: 2019-04-01

## 2019-04-01 RX ADMIN — LIDOCAINE HYDROCHLORIDE 2 ML: 10 INJECTION, SOLUTION INFILTRATION; PERINEURAL at 11:29

## 2019-04-01 RX ADMIN — BETAMETHASONE SODIUM PHOSPHATE AND BETAMETHASONE ACETATE 6 MG: 3; 3 INJECTION, SUSPENSION INTRA-ARTICULAR; INTRALESIONAL; INTRAMUSCULAR; SOFT TISSUE at 11:29

## 2019-04-12 ENCOUNTER — OFFICE VISIT (OUTPATIENT)
Dept: FAMILY MEDICINE CLINIC | Facility: CLINIC | Age: 44
End: 2019-04-12

## 2019-04-12 VITALS
HEIGHT: 67 IN | BODY MASS INDEX: 34.84 KG/M2 | WEIGHT: 222 LBS | DIASTOLIC BLOOD PRESSURE: 90 MMHG | SYSTOLIC BLOOD PRESSURE: 136 MMHG | TEMPERATURE: 99.3 F | RESPIRATION RATE: 18 BRPM | HEART RATE: 86 BPM

## 2019-04-12 DIAGNOSIS — G43.909 MIGRAINE WITHOUT STATUS MIGRAINOSUS, NOT INTRACTABLE, UNSPECIFIED MIGRAINE TYPE: ICD-10-CM

## 2019-04-12 DIAGNOSIS — M54.40 CHRONIC MIDLINE LOW BACK PAIN WITH SCIATICA, SCIATICA LATERALITY UNSPECIFIED: ICD-10-CM

## 2019-04-12 DIAGNOSIS — G89.29 CHRONIC MIDLINE LOW BACK PAIN WITH SCIATICA, SCIATICA LATERALITY UNSPECIFIED: ICD-10-CM

## 2019-04-12 DIAGNOSIS — G47.00 INSOMNIA, UNSPECIFIED TYPE: ICD-10-CM

## 2019-04-12 DIAGNOSIS — R63.5 WEIGHT GAIN: Primary | ICD-10-CM

## 2019-04-12 PROCEDURE — 99213 OFFICE O/P EST LOW 20 MIN: CPT | Performed by: FAMILY MEDICINE

## 2019-04-12 RX ORDER — TRAZODONE HYDROCHLORIDE 100 MG/1
150 TABLET ORAL
Qty: 30 TABLET | Refills: 3 | Status: SHIPPED | OUTPATIENT
Start: 2019-04-12 | End: 2019-05-13

## 2019-04-12 RX ORDER — RIZATRIPTAN BENZOATE 10 MG/1
10 TABLET ORAL ONCE AS NEEDED
Qty: 9 TABLET | Refills: 0 | Status: SHIPPED | OUTPATIENT
Start: 2019-04-12 | End: 2019-06-17 | Stop reason: SDUPTHER

## 2019-04-12 RX ORDER — BUTALBITAL, ACETAMINOPHEN AND CAFFEINE 50; 325; 40 MG/1; MG/1; MG/1
TABLET ORAL
COMMUNITY
Start: 2011-05-04 | End: 2019-04-12 | Stop reason: ALTCHOICE

## 2019-04-12 RX ORDER — OXYCODONE HYDROCHLORIDE AND ACETAMINOPHEN 5; 325 MG/1; MG/1
TABLET ORAL
COMMUNITY
Start: 2014-04-24 | End: 2019-05-20 | Stop reason: ALTCHOICE

## 2019-04-12 RX ORDER — GABAPENTIN 300 MG/1
300 CAPSULE ORAL 2 TIMES DAILY
Qty: 60 CAPSULE | Refills: 2 | Status: SHIPPED | OUTPATIENT
Start: 2019-04-12 | End: 2019-05-14

## 2019-04-12 RX ORDER — TIZANIDINE 4 MG/1
TABLET ORAL
Refills: 2 | COMMUNITY
Start: 2019-01-23 | End: 2019-04-12

## 2019-04-12 RX ORDER — CYCLOBENZAPRINE HCL 10 MG
10 TABLET ORAL 3 TIMES DAILY PRN
Qty: 30 TABLET | Refills: 2 | Status: SHIPPED | OUTPATIENT
Start: 2019-04-12 | End: 2019-05-13

## 2019-04-12 RX ORDER — MELOXICAM 15 MG/1
15 TABLET ORAL DAILY
Qty: 30 TABLET | Refills: 3 | Status: SHIPPED | OUTPATIENT
Start: 2019-04-12 | End: 2019-05-14

## 2019-04-24 ENCOUNTER — APPOINTMENT (OUTPATIENT)
Dept: LAB | Facility: HOSPITAL | Age: 44
End: 2019-04-24
Payer: COMMERCIAL

## 2019-04-24 DIAGNOSIS — R63.5 WEIGHT GAIN: ICD-10-CM

## 2019-04-24 LAB
ALBUMIN SERPL BCP-MCNC: 3.9 G/DL (ref 3.5–5)
ALP SERPL-CCNC: 85 U/L (ref 46–116)
ALT SERPL W P-5'-P-CCNC: 41 U/L (ref 12–78)
ANION GAP SERPL CALCULATED.3IONS-SCNC: 3 MMOL/L (ref 4–13)
AST SERPL W P-5'-P-CCNC: 21 U/L (ref 5–45)
BILIRUB SERPL-MCNC: 0.44 MG/DL (ref 0.2–1)
BUN SERPL-MCNC: 13 MG/DL (ref 5–25)
CALCIUM SERPL-MCNC: 7.9 MG/DL (ref 8.3–10.1)
CHLORIDE SERPL-SCNC: 107 MMOL/L (ref 100–108)
CHOLEST SERPL-MCNC: 233 MG/DL (ref 50–200)
CO2 SERPL-SCNC: 26 MMOL/L (ref 21–32)
CREAT SERPL-MCNC: 0.85 MG/DL (ref 0.6–1.3)
GFR SERPL CREATININE-BSD FRML MDRD: 83 ML/MIN/1.73SQ M
GLUCOSE P FAST SERPL-MCNC: 82 MG/DL (ref 65–99)
HDLC SERPL-MCNC: 44 MG/DL (ref 40–60)
LDLC SERPL CALC-MCNC: 162 MG/DL (ref 0–100)
NONHDLC SERPL-MCNC: 189 MG/DL
POTASSIUM SERPL-SCNC: 3.6 MMOL/L (ref 3.5–5.3)
PROT SERPL-MCNC: 7.4 G/DL (ref 6.4–8.2)
SODIUM SERPL-SCNC: 136 MMOL/L (ref 136–145)
TRIGL SERPL-MCNC: 134 MG/DL
TSH SERPL DL<=0.05 MIU/L-ACNC: 0.92 UIU/ML (ref 0.36–3.74)

## 2019-04-24 PROCEDURE — 80053 COMPREHEN METABOLIC PANEL: CPT

## 2019-04-24 PROCEDURE — 84443 ASSAY THYROID STIM HORMONE: CPT

## 2019-04-24 PROCEDURE — 80061 LIPID PANEL: CPT

## 2019-04-24 PROCEDURE — 36415 COLL VENOUS BLD VENIPUNCTURE: CPT

## 2019-05-02 ENCOUNTER — OFFICE VISIT (OUTPATIENT)
Dept: FAMILY MEDICINE CLINIC | Facility: CLINIC | Age: 44
End: 2019-05-02

## 2019-05-02 VITALS — WEIGHT: 222.6 LBS | BODY MASS INDEX: 34.86 KG/M2

## 2019-05-02 DIAGNOSIS — M99.01 SOMATIC DYSFUNCTION OF CERVICAL REGION: ICD-10-CM

## 2019-05-02 DIAGNOSIS — M99.05 SOMATIC DYSFUNCTION OF PELVIS REGION: ICD-10-CM

## 2019-05-02 DIAGNOSIS — M99.03 SOMATIC DYSFUNCTION OF LUMBAR REGION: Primary | ICD-10-CM

## 2019-05-02 DIAGNOSIS — M99.00 SOMATIC DYSFUNCTION OF HEAD REGION: ICD-10-CM

## 2019-05-02 DIAGNOSIS — M99.02 SOMATIC DYSFUNCTION OF THORACIC REGION: ICD-10-CM

## 2019-05-06 ENCOUNTER — OFFICE VISIT (OUTPATIENT)
Dept: OBGYN CLINIC | Facility: OTHER | Age: 44
End: 2019-05-06
Payer: COMMERCIAL

## 2019-05-06 VITALS — WEIGHT: 210 LBS | BODY MASS INDEX: 32.96 KG/M2 | HEIGHT: 67 IN

## 2019-05-06 DIAGNOSIS — M79.7 FIBROMYALGIA: ICD-10-CM

## 2019-05-06 DIAGNOSIS — G56.02 CARPAL TUNNEL SYNDROME OF LEFT WRIST: ICD-10-CM

## 2019-05-06 DIAGNOSIS — M65.4 TENDINITIS, DE QUERVAIN'S: Primary | ICD-10-CM

## 2019-05-06 PROCEDURE — 99213 OFFICE O/P EST LOW 20 MIN: CPT | Performed by: ORTHOPAEDIC SURGERY

## 2019-05-13 ENCOUNTER — OFFICE VISIT (OUTPATIENT)
Dept: FAMILY MEDICINE CLINIC | Facility: CLINIC | Age: 44
End: 2019-05-13

## 2019-05-13 VITALS
TEMPERATURE: 98.6 F | DIASTOLIC BLOOD PRESSURE: 72 MMHG | WEIGHT: 225.4 LBS | BODY MASS INDEX: 35.38 KG/M2 | HEIGHT: 67 IN | SYSTOLIC BLOOD PRESSURE: 122 MMHG | RESPIRATION RATE: 14 BRPM | HEART RATE: 88 BPM

## 2019-05-13 DIAGNOSIS — G89.29 CHRONIC MIDLINE LOW BACK PAIN WITH SCIATICA, SCIATICA LATERALITY UNSPECIFIED: ICD-10-CM

## 2019-05-13 DIAGNOSIS — G47.00 INSOMNIA, UNSPECIFIED TYPE: ICD-10-CM

## 2019-05-13 DIAGNOSIS — E66.9 OBESITY (BMI 30.0-34.9): Primary | ICD-10-CM

## 2019-05-13 DIAGNOSIS — M54.40 CHRONIC MIDLINE LOW BACK PAIN WITH SCIATICA, SCIATICA LATERALITY UNSPECIFIED: ICD-10-CM

## 2019-05-13 PROCEDURE — 99213 OFFICE O/P EST LOW 20 MIN: CPT | Performed by: FAMILY MEDICINE

## 2019-05-13 RX ORDER — TRAZODONE HYDROCHLORIDE 150 MG/1
150 TABLET ORAL
Qty: 30 TABLET | Refills: 3 | Status: SHIPPED | OUTPATIENT
Start: 2019-05-13 | End: 2019-08-27 | Stop reason: SDUPTHER

## 2019-05-13 RX ORDER — TIZANIDINE 4 MG/1
4 TABLET ORAL EVERY 8 HOURS PRN
Qty: 90 TABLET | Refills: 1 | Status: SHIPPED | OUTPATIENT
Start: 2019-05-13 | End: 2019-08-27 | Stop reason: SDUPTHER

## 2019-05-13 RX ORDER — TRAZODONE HYDROCHLORIDE 150 MG/1
TABLET ORAL
Refills: 3 | COMMUNITY
Start: 2019-04-13 | End: 2019-05-13 | Stop reason: SDUPTHER

## 2019-05-14 ENCOUNTER — OFFICE VISIT (OUTPATIENT)
Dept: FAMILY MEDICINE CLINIC | Facility: CLINIC | Age: 44
End: 2019-05-14

## 2019-05-14 VITALS — BODY MASS INDEX: 35.53 KG/M2 | HEIGHT: 67 IN | WEIGHT: 226.4 LBS

## 2019-05-14 DIAGNOSIS — M99.02 SOMATIC DYSFUNCTION OF THORACIC REGION: ICD-10-CM

## 2019-05-14 DIAGNOSIS — M99.01 SOMATIC DYSFUNCTION OF CERVICAL REGION: Primary | ICD-10-CM

## 2019-05-14 DIAGNOSIS — M99.03 SOMATIC DYSFUNCTION OF LUMBAR REGION: ICD-10-CM

## 2019-05-14 DIAGNOSIS — M99.00 SOMATIC DYSFUNCTION OF HEAD REGION: ICD-10-CM

## 2019-05-14 DIAGNOSIS — M99.05 SOMATIC DYSFUNCTION OF PELVIS REGION: ICD-10-CM

## 2019-05-14 PROBLEM — E66.9 OBESITY (BMI 30.0-34.9): Status: ACTIVE | Noted: 2019-05-14

## 2019-05-14 PROBLEM — G89.29 CHRONIC MIDLINE LOW BACK PAIN WITH SCIATICA: Status: ACTIVE | Noted: 2019-05-14

## 2019-05-14 PROBLEM — G47.00 INSOMNIA: Status: ACTIVE | Noted: 2019-05-14

## 2019-05-14 PROBLEM — M54.40 CHRONIC MIDLINE LOW BACK PAIN WITH SCIATICA: Status: ACTIVE | Noted: 2019-05-14

## 2019-05-15 ENCOUNTER — ANESTHESIA EVENT (OUTPATIENT)
Dept: PERIOP | Facility: HOSPITAL | Age: 44
End: 2019-05-15
Payer: COMMERCIAL

## 2019-05-16 ENCOUNTER — ANESTHESIA (OUTPATIENT)
Dept: PERIOP | Facility: HOSPITAL | Age: 44
End: 2019-05-16
Payer: COMMERCIAL

## 2019-05-16 ENCOUNTER — HOSPITAL ENCOUNTER (OUTPATIENT)
Facility: HOSPITAL | Age: 44
Setting detail: OUTPATIENT SURGERY
Discharge: HOME | End: 2019-05-16
Attending: ORTHOPAEDIC SURGERY | Admitting: ORTHOPAEDIC SURGERY
Payer: COMMERCIAL

## 2019-05-16 VITALS
DIASTOLIC BLOOD PRESSURE: 90 MMHG | SYSTOLIC BLOOD PRESSURE: 142 MMHG | BODY MASS INDEX: 35.24 KG/M2 | RESPIRATION RATE: 16 BRPM | OXYGEN SATURATION: 96 % | WEIGHT: 225 LBS | TEMPERATURE: 96.3 F | HEART RATE: 77 BPM

## 2019-05-16 DIAGNOSIS — G56.02 CARPAL TUNNEL SYNDROME OF LEFT WRIST: ICD-10-CM

## 2019-05-16 DIAGNOSIS — M65.4 TENDINITIS, DE QUERVAIN'S: Primary | ICD-10-CM

## 2019-05-16 PROCEDURE — 25000 INCISION OF TENDON SHEATH: CPT | Performed by: ORTHOPAEDIC SURGERY

## 2019-05-16 PROCEDURE — 64721 CARPAL TUNNEL SURGERY: CPT | Performed by: ORTHOPAEDIC SURGERY

## 2019-05-16 PROCEDURE — 25000 INCISION OF TENDON SHEATH: CPT | Performed by: PHYSICIAN ASSISTANT

## 2019-05-16 PROCEDURE — 64721 CARPAL TUNNEL SURGERY: CPT | Performed by: PHYSICIAN ASSISTANT

## 2019-05-16 RX ORDER — MEPERIDINE HYDROCHLORIDE 25 MG/ML
12.5 INJECTION INTRAMUSCULAR; INTRAVENOUS; SUBCUTANEOUS
Status: DISCONTINUED | OUTPATIENT
Start: 2019-05-16 | End: 2019-05-16 | Stop reason: HOSPADM

## 2019-05-16 RX ORDER — MAGNESIUM HYDROXIDE 1200 MG/15ML
LIQUID ORAL AS NEEDED
Status: DISCONTINUED | OUTPATIENT
Start: 2019-05-16 | End: 2019-05-16 | Stop reason: HOSPADM

## 2019-05-16 RX ORDER — MIDAZOLAM HYDROCHLORIDE 1 MG/ML
INJECTION INTRAMUSCULAR; INTRAVENOUS AS NEEDED
Status: DISCONTINUED | OUTPATIENT
Start: 2019-05-16 | End: 2019-05-16 | Stop reason: SURG

## 2019-05-16 RX ORDER — PROPOFOL 10 MG/ML
INJECTION, EMULSION INTRAVENOUS AS NEEDED
Status: DISCONTINUED | OUTPATIENT
Start: 2019-05-16 | End: 2019-05-16 | Stop reason: SURG

## 2019-05-16 RX ORDER — PROMETHAZINE HYDROCHLORIDE 25 MG/ML
12.5 INJECTION, SOLUTION INTRAMUSCULAR; INTRAVENOUS ONCE AS NEEDED
Status: DISCONTINUED | OUTPATIENT
Start: 2019-05-16 | End: 2019-05-16 | Stop reason: HOSPADM

## 2019-05-16 RX ORDER — SODIUM CHLORIDE, SODIUM LACTATE, POTASSIUM CHLORIDE, CALCIUM CHLORIDE 600; 310; 30; 20 MG/100ML; MG/100ML; MG/100ML; MG/100ML
50 INJECTION, SOLUTION INTRAVENOUS CONTINUOUS
Status: DISCONTINUED | OUTPATIENT
Start: 2019-05-17 | End: 2019-05-16 | Stop reason: HOSPADM

## 2019-05-16 RX ORDER — FENTANYL CITRATE 50 UG/ML
INJECTION, SOLUTION INTRAMUSCULAR; INTRAVENOUS AS NEEDED
Status: DISCONTINUED | OUTPATIENT
Start: 2019-05-16 | End: 2019-05-16 | Stop reason: SURG

## 2019-05-16 RX ORDER — LIDOCAINE HYDROCHLORIDE 10 MG/ML
INJECTION, SOLUTION INFILTRATION; PERINEURAL AS NEEDED
Status: DISCONTINUED | OUTPATIENT
Start: 2019-05-16 | End: 2019-05-16 | Stop reason: SURG

## 2019-05-16 RX ORDER — HYDROMORPHONE HCL/PF 1 MG/ML
0.5 SYRINGE (ML) INJECTION
Status: COMPLETED | OUTPATIENT
Start: 2019-05-16 | End: 2019-05-16

## 2019-05-16 RX ORDER — ONDANSETRON 2 MG/ML
4 INJECTION INTRAMUSCULAR; INTRAVENOUS ONCE AS NEEDED
Status: DISCONTINUED | OUTPATIENT
Start: 2019-05-16 | End: 2019-05-16 | Stop reason: HOSPADM

## 2019-05-16 RX ORDER — PROPOFOL 10 MG/ML
INJECTION, EMULSION INTRAVENOUS CONTINUOUS PRN
Status: DISCONTINUED | OUTPATIENT
Start: 2019-05-16 | End: 2019-05-16 | Stop reason: SURG

## 2019-05-16 RX ORDER — ONDANSETRON 2 MG/ML
INJECTION INTRAMUSCULAR; INTRAVENOUS AS NEEDED
Status: DISCONTINUED | OUTPATIENT
Start: 2019-05-16 | End: 2019-05-16 | Stop reason: SURG

## 2019-05-16 RX ORDER — TRAMADOL HYDROCHLORIDE 50 MG/1
50 TABLET ORAL EVERY 6 HOURS PRN
Qty: 15 TABLET | Refills: 0 | Status: SHIPPED | OUTPATIENT
Start: 2019-05-16 | End: 2019-05-20 | Stop reason: ALTCHOICE

## 2019-05-16 RX ORDER — ROPIVACAINE HYDROCHLORIDE 2 MG/ML
INJECTION, SOLUTION EPIDURAL; INFILTRATION; PERINEURAL AS NEEDED
Status: DISCONTINUED | OUTPATIENT
Start: 2019-05-16 | End: 2019-05-16 | Stop reason: HOSPADM

## 2019-05-16 RX ORDER — TRAMADOL HYDROCHLORIDE 50 MG/1
50 TABLET ORAL EVERY 6 HOURS PRN
Status: DISCONTINUED | OUTPATIENT
Start: 2019-05-16 | End: 2019-05-16 | Stop reason: HOSPADM

## 2019-05-16 RX ADMIN — PROPOFOL 50 MG: 10 INJECTION, EMULSION INTRAVENOUS at 08:42

## 2019-05-16 RX ADMIN — SODIUM CHLORIDE, SODIUM LACTATE, POTASSIUM CHLORIDE, AND CALCIUM CHLORIDE: .6; .31; .03; .02 INJECTION, SOLUTION INTRAVENOUS at 08:47

## 2019-05-16 RX ADMIN — PROPOFOL 150 MG: 10 INJECTION, EMULSION INTRAVENOUS at 08:11

## 2019-05-16 RX ADMIN — PROPOFOL 20 MG: 10 INJECTION, EMULSION INTRAVENOUS at 09:08

## 2019-05-16 RX ADMIN — FENTANYL CITRATE 50 MCG: 50 INJECTION INTRAMUSCULAR; INTRAVENOUS at 08:11

## 2019-05-16 RX ADMIN — SODIUM CHLORIDE, SODIUM LACTATE, POTASSIUM CHLORIDE, AND CALCIUM CHLORIDE: .6; .31; .03; .02 INJECTION, SOLUTION INTRAVENOUS at 08:01

## 2019-05-16 RX ADMIN — HYDROMORPHONE HYDROCHLORIDE 0.5 MG: 1 INJECTION, SOLUTION INTRAMUSCULAR; INTRAVENOUS; SUBCUTANEOUS at 10:23

## 2019-05-16 RX ADMIN — PROPOFOL 100 MCG/KG/MIN: 10 INJECTION, EMULSION INTRAVENOUS at 08:14

## 2019-05-16 RX ADMIN — HYDROMORPHONE HYDROCHLORIDE 0.5 MG: 1 INJECTION, SOLUTION INTRAMUSCULAR; INTRAVENOUS; SUBCUTANEOUS at 09:52

## 2019-05-16 RX ADMIN — HYDROMORPHONE HYDROCHLORIDE 0.5 MG: 1 INJECTION, SOLUTION INTRAMUSCULAR; INTRAVENOUS; SUBCUTANEOUS at 09:27

## 2019-05-16 RX ADMIN — FENTANYL CITRATE 50 MCG: 50 INJECTION INTRAMUSCULAR; INTRAVENOUS at 09:19

## 2019-05-16 RX ADMIN — MIDAZOLAM HYDROCHLORIDE 2 MG: 1 INJECTION, SOLUTION INTRAMUSCULAR; INTRAVENOUS at 08:11

## 2019-05-16 RX ADMIN — HYDROMORPHONE HYDROCHLORIDE 0.5 MG: 1 INJECTION, SOLUTION INTRAMUSCULAR; INTRAVENOUS; SUBCUTANEOUS at 09:42

## 2019-05-16 RX ADMIN — LIDOCAINE HYDROCHLORIDE 50 MG: 10 INJECTION, SOLUTION INFILTRATION; PERINEURAL at 08:11

## 2019-05-16 RX ADMIN — ONDANSETRON HYDROCHLORIDE 4 MG: 2 INJECTION, SOLUTION INTRAMUSCULAR; INTRAVENOUS at 08:46

## 2019-05-16 RX ADMIN — HYDROMORPHONE HYDROCHLORIDE 0.5 MG: 1 INJECTION, SOLUTION INTRAMUSCULAR; INTRAVENOUS; SUBCUTANEOUS at 10:06

## 2019-05-16 RX ADMIN — HYDROMORPHONE HYDROCHLORIDE 0.5 MG: 1 INJECTION, SOLUTION INTRAMUSCULAR; INTRAVENOUS; SUBCUTANEOUS at 10:13

## 2019-05-16 RX ADMIN — FENTANYL CITRATE 50 MCG: 50 INJECTION INTRAMUSCULAR; INTRAVENOUS at 09:05

## 2019-05-20 ENCOUNTER — OFFICE VISIT (OUTPATIENT)
Dept: FAMILY MEDICINE CLINIC | Facility: CLINIC | Age: 44
End: 2019-05-20

## 2019-05-20 VITALS
HEART RATE: 88 BPM | HEIGHT: 67 IN | WEIGHT: 221.2 LBS | RESPIRATION RATE: 16 BRPM | DIASTOLIC BLOOD PRESSURE: 100 MMHG | SYSTOLIC BLOOD PRESSURE: 142 MMHG | TEMPERATURE: 99.3 F | BODY MASS INDEX: 34.72 KG/M2

## 2019-05-20 DIAGNOSIS — R01.1 SYSTOLIC MURMUR: ICD-10-CM

## 2019-05-20 DIAGNOSIS — R23.8 EASY BRUISING: Primary | ICD-10-CM

## 2019-05-20 PROBLEM — R23.3 EASY BRUISING: Status: ACTIVE | Noted: 2019-05-20

## 2019-05-20 PROCEDURE — 99213 OFFICE O/P EST LOW 20 MIN: CPT | Performed by: FAMILY MEDICINE

## 2019-05-28 ENCOUNTER — OFFICE VISIT (OUTPATIENT)
Dept: OBGYN CLINIC | Facility: CLINIC | Age: 44
End: 2019-05-28

## 2019-05-28 VITALS — BODY MASS INDEX: 32.96 KG/M2 | HEIGHT: 67 IN | WEIGHT: 210 LBS

## 2019-05-28 DIAGNOSIS — G56.02 CARPAL TUNNEL SYNDROME OF LEFT WRIST: ICD-10-CM

## 2019-05-28 DIAGNOSIS — M65.4 TENDINITIS, DE QUERVAIN'S: Primary | ICD-10-CM

## 2019-05-28 PROCEDURE — 99024 POSTOP FOLLOW-UP VISIT: CPT | Performed by: ORTHOPAEDIC SURGERY

## 2019-06-04 ENCOUNTER — OFFICE VISIT (OUTPATIENT)
Dept: FAMILY MEDICINE CLINIC | Facility: CLINIC | Age: 44
End: 2019-06-04

## 2019-06-04 VITALS
HEART RATE: 84 BPM | SYSTOLIC BLOOD PRESSURE: 124 MMHG | DIASTOLIC BLOOD PRESSURE: 80 MMHG | BODY MASS INDEX: 36.66 KG/M2 | TEMPERATURE: 97.9 F | HEIGHT: 67 IN | RESPIRATION RATE: 16 BRPM | WEIGHT: 233.6 LBS

## 2019-06-04 DIAGNOSIS — E66.9 CLASS 2 OBESITY WITH BODY MASS INDEX (BMI) OF 36.0 TO 36.9 IN ADULT, UNSPECIFIED OBESITY TYPE, UNSPECIFIED WHETHER SERIOUS COMORBIDITY PRESENT: Primary | ICD-10-CM

## 2019-06-04 PROCEDURE — 99213 OFFICE O/P EST LOW 20 MIN: CPT | Performed by: FAMILY MEDICINE

## 2019-06-07 ENCOUNTER — OFFICE VISIT (OUTPATIENT)
Dept: FAMILY MEDICINE CLINIC | Facility: CLINIC | Age: 44
End: 2019-06-07

## 2019-06-07 VITALS — WEIGHT: 222.4 LBS | HEIGHT: 67 IN | BODY MASS INDEX: 34.91 KG/M2

## 2019-06-07 DIAGNOSIS — M99.02 SOMATIC DYSFUNCTION OF THORACIC REGION: ICD-10-CM

## 2019-06-07 DIAGNOSIS — M99.03 SOMATIC DYSFUNCTION OF LUMBAR REGION: ICD-10-CM

## 2019-06-07 DIAGNOSIS — M99.00 SOMATIC DYSFUNCTION OF HEAD REGION: ICD-10-CM

## 2019-06-07 DIAGNOSIS — M99.01 SOMATIC DYSFUNCTION OF CERVICAL REGION: Primary | ICD-10-CM

## 2019-06-15 ENCOUNTER — APPOINTMENT (OUTPATIENT)
Dept: LAB | Facility: HOSPITAL | Age: 44
End: 2019-06-15
Payer: COMMERCIAL

## 2019-06-15 DIAGNOSIS — R23.8 EASY BRUISING: ICD-10-CM

## 2019-06-15 LAB
ALBUMIN SERPL BCP-MCNC: 4 G/DL (ref 3.5–5)
ALP SERPL-CCNC: 78 U/L (ref 46–116)
ALT SERPL W P-5'-P-CCNC: 26 U/L (ref 12–78)
ANION GAP SERPL CALCULATED.3IONS-SCNC: 6 MMOL/L (ref 4–13)
APTT PPP: 31 SECONDS (ref 26–38)
AST SERPL W P-5'-P-CCNC: 12 U/L (ref 5–45)
BASOPHILS # BLD AUTO: 0.02 THOUSANDS/ΜL (ref 0–0.1)
BASOPHILS NFR BLD AUTO: 0 % (ref 0–1)
BILIRUB SERPL-MCNC: 0.54 MG/DL (ref 0.2–1)
BLD SMEAR INTERP: NORMAL
BUN SERPL-MCNC: 14 MG/DL (ref 5–25)
CALCIUM SERPL-MCNC: 8.8 MG/DL (ref 8.3–10.1)
CHLORIDE SERPL-SCNC: 108 MMOL/L (ref 100–108)
CO2 SERPL-SCNC: 27 MMOL/L (ref 21–32)
CREAT SERPL-MCNC: 0.87 MG/DL (ref 0.6–1.3)
EOSINOPHIL # BLD AUTO: 0.13 THOUSAND/ΜL (ref 0–0.61)
EOSINOPHIL NFR BLD AUTO: 1 % (ref 0–6)
ERYTHROCYTE [DISTWIDTH] IN BLOOD BY AUTOMATED COUNT: 13.3 % (ref 11.6–15.1)
GFR SERPL CREATININE-BSD FRML MDRD: 81 ML/MIN/1.73SQ M
GLUCOSE P FAST SERPL-MCNC: 79 MG/DL (ref 65–99)
HCT VFR BLD AUTO: 43.7 % (ref 34.8–46.1)
HGB BLD-MCNC: 14.1 G/DL (ref 11.5–15.4)
IMM GRANULOCYTES # BLD AUTO: 0.03 THOUSAND/UL (ref 0–0.2)
IMM GRANULOCYTES NFR BLD AUTO: 0 % (ref 0–2)
INR PPP: 0.93 (ref 0.86–1.17)
LYMPHOCYTES # BLD AUTO: 2.88 THOUSANDS/ΜL (ref 0.6–4.47)
LYMPHOCYTES NFR BLD AUTO: 27 % (ref 14–44)
MCH RBC QN AUTO: 30.8 PG (ref 26.8–34.3)
MCHC RBC AUTO-ENTMCNC: 32.3 G/DL (ref 31.4–37.4)
MCV RBC AUTO: 95 FL (ref 82–98)
MONOCYTES # BLD AUTO: 0.45 THOUSAND/ΜL (ref 0.17–1.22)
MONOCYTES NFR BLD AUTO: 4 % (ref 4–12)
NEUTROPHILS # BLD AUTO: 7.05 THOUSANDS/ΜL (ref 1.85–7.62)
NEUTS SEG NFR BLD AUTO: 68 % (ref 43–75)
NRBC BLD AUTO-RTO: 0 /100 WBCS
PLATELET # BLD AUTO: 207 THOUSANDS/UL (ref 149–390)
PMV BLD AUTO: 9.9 FL (ref 8.9–12.7)
POTASSIUM SERPL-SCNC: 3.8 MMOL/L (ref 3.5–5.3)
PROT SERPL-MCNC: 7.4 G/DL (ref 6.4–8.2)
PROTHROMBIN TIME: 12.6 SECONDS (ref 11.8–14.2)
RBC # BLD AUTO: 4.58 MILLION/UL (ref 3.81–5.12)
SODIUM SERPL-SCNC: 141 MMOL/L (ref 136–145)
WBC # BLD AUTO: 10.56 THOUSAND/UL (ref 4.31–10.16)

## 2019-06-15 PROCEDURE — 85610 PROTHROMBIN TIME: CPT

## 2019-06-15 PROCEDURE — 36415 COLL VENOUS BLD VENIPUNCTURE: CPT

## 2019-06-15 PROCEDURE — 85025 COMPLETE CBC W/AUTO DIFF WBC: CPT

## 2019-06-15 PROCEDURE — 85730 THROMBOPLASTIN TIME PARTIAL: CPT

## 2019-06-15 PROCEDURE — 80053 COMPREHEN METABOLIC PANEL: CPT

## 2019-06-17 ENCOUNTER — OFFICE VISIT (OUTPATIENT)
Dept: FAMILY MEDICINE CLINIC | Facility: CLINIC | Age: 44
End: 2019-06-17

## 2019-06-17 VITALS
HEIGHT: 67 IN | DIASTOLIC BLOOD PRESSURE: 80 MMHG | BODY MASS INDEX: 34.84 KG/M2 | TEMPERATURE: 99 F | RESPIRATION RATE: 18 BRPM | HEART RATE: 82 BPM | WEIGHT: 222 LBS | SYSTOLIC BLOOD PRESSURE: 150 MMHG

## 2019-06-17 DIAGNOSIS — G89.29 CHRONIC MIDLINE LOW BACK PAIN WITH SCIATICA, SCIATICA LATERALITY UNSPECIFIED: ICD-10-CM

## 2019-06-17 DIAGNOSIS — R23.8 EASY BRUISING: Primary | ICD-10-CM

## 2019-06-17 DIAGNOSIS — G89.29 CHRONIC PAIN OF BOTH ANKLES: ICD-10-CM

## 2019-06-17 DIAGNOSIS — Z71.6 ENCOUNTER FOR SMOKING CESSATION COUNSELING: ICD-10-CM

## 2019-06-17 DIAGNOSIS — M54.40 CHRONIC MIDLINE LOW BACK PAIN WITH SCIATICA, SCIATICA LATERALITY UNSPECIFIED: ICD-10-CM

## 2019-06-17 DIAGNOSIS — M25.571 CHRONIC PAIN OF BOTH ANKLES: ICD-10-CM

## 2019-06-17 DIAGNOSIS — G43.909 MIGRAINE WITHOUT STATUS MIGRAINOSUS, NOT INTRACTABLE, UNSPECIFIED MIGRAINE TYPE: ICD-10-CM

## 2019-06-17 DIAGNOSIS — M25.572 CHRONIC PAIN OF BOTH ANKLES: ICD-10-CM

## 2019-06-17 PROCEDURE — 99213 OFFICE O/P EST LOW 20 MIN: CPT | Performed by: FAMILY MEDICINE

## 2019-06-17 RX ORDER — IBUPROFEN 600 MG/1
600 TABLET ORAL EVERY 6 HOURS PRN
Qty: 30 TABLET | Refills: 0 | Status: SHIPPED | OUTPATIENT
Start: 2019-06-17 | End: 2019-08-13 | Stop reason: SDUPTHER

## 2019-06-17 RX ORDER — VARENICLINE TARTRATE 25 MG
KIT ORAL
Qty: 53 TABLET | Refills: 0 | Status: SHIPPED | OUTPATIENT
Start: 2019-06-17 | End: 2019-10-07 | Stop reason: ALTCHOICE

## 2019-06-17 RX ORDER — RIZATRIPTAN BENZOATE 10 MG/1
10 TABLET ORAL ONCE AS NEEDED
Qty: 9 TABLET | Refills: 0 | Status: SHIPPED | OUTPATIENT
Start: 2019-06-17 | End: 2019-08-27 | Stop reason: SDUPTHER

## 2019-07-22 PROBLEM — E66.01 SEVERE OBESITY (HCC): Status: ACTIVE | Noted: 2019-06-04

## 2019-08-13 ENCOUNTER — OFFICE VISIT (OUTPATIENT)
Dept: FAMILY MEDICINE CLINIC | Facility: CLINIC | Age: 44
End: 2019-08-13

## 2019-08-13 VITALS — WEIGHT: 224 LBS | BODY MASS INDEX: 35.16 KG/M2 | HEIGHT: 67 IN

## 2019-08-13 DIAGNOSIS — M99.03 SOMATIC DYSFUNCTION OF LUMBAR REGION: Primary | ICD-10-CM

## 2019-08-13 DIAGNOSIS — M99.02 SOMATIC DYSFUNCTION OF THORACIC REGION: ICD-10-CM

## 2019-08-13 DIAGNOSIS — M99.04 SOMATIC DYSFUNCTION OF SACRAL REGION: ICD-10-CM

## 2019-08-13 DIAGNOSIS — M99.05 SOMATIC DYSFUNCTION OF PELVIS REGION: ICD-10-CM

## 2019-08-13 DIAGNOSIS — G89.29 CHRONIC MIDLINE LOW BACK PAIN WITH SCIATICA, SCIATICA LATERALITY UNSPECIFIED: ICD-10-CM

## 2019-08-13 DIAGNOSIS — M54.40 CHRONIC MIDLINE LOW BACK PAIN WITH SCIATICA, SCIATICA LATERALITY UNSPECIFIED: ICD-10-CM

## 2019-08-13 RX ORDER — IBUPROFEN 600 MG/1
600 TABLET ORAL EVERY 6 HOURS PRN
Qty: 30 TABLET | Refills: 1 | Status: SHIPPED | OUTPATIENT
Start: 2019-08-13 | End: 2019-08-27 | Stop reason: SDUPTHER

## 2019-08-13 NOTE — PROGRESS NOTES
The Assessment/Plan        Problem List Items Addressed This Visit        Nervous and Auditory    Chronic midline low back pain with sciatica    Relevant Medications    ibuprofen (MOTRIN) 600 mg tablet      Other Visit Diagnoses     Somatic dysfunction of lumbar region    -  Primary    Somatic dysfunction of thoracic region        Somatic dysfunction of sacral region        Somatic dysfunction of pelvis region               This is a 37 y o  female who presents for OMT follow-up  1  Patient tolerated OMT well for the above problems, advised stretching and heat application for continued relief  2  OMT Follow up in 2 weeks  Elicia Hernandez is a 37 y o  female and is here for a OMT follow up  The patient reports she has a severe migraine today and is refusing any treatments of the head or neck, but would like to focus on her lower back and pelvis today  She denies any change in the quantity or quality of lower back pain, 7/10 today  She has no new complaints or concerns today  Is the patient taking Pain medication? yes  Has the patient completed physical therapy for this condition? yes  Did Patient symptoms improve from last OMT appointment? no    The following portions of the patient's history were reviewed and updated as appropriate: allergies, current medications, past family history, past medical history, past social history, past surgical history and problem list     Review of Systems  Review of Systems   Musculoskeletal: Positive for back pain, neck pain and neck stiffness  Negative for gait problem  As in HPI    Neurological: Positive for numbness (intermittent lower extremities, not currently ) and headaches  Negative for weakness         Objective     OMT Exam   Thoracic T5-9:   Somatic Dysfunction:  Tissue Texture Changes and Restriction  Severity :  1  Osteopathic Findings: Paraspinal hypertonicity, myofascial restriction  Treatment Method: ST and MFR  Response: improved  Thoracic T10-12:  Somatic Dysfunction:  Tissue Texture Changes and Restriction  Severity :  1  Osteopathic Findings: Paraspinal hypertonicity, myofascial restriction   Treatment Method: ST and MFR  Response: improved  Lumbar:  Somatic Dysfunction:  Tissue Texture Changes, Asymmetry , Restriction and Tenderness  Severity :  3  Osteopathic Findings: L3 ESRRR, L4-5 NRLSR, paraspinal hypertonicity   Treatment Method: ME, ST, MFR and FPR  Response: improved  Sacrum:  Somatic Dysfunction:  Asymmetry , Restriction and Tenderness  Severity :  2  Osteopathic Findings: Left on left sacral torsion, right myofascial restriction   Treatment Method: FPR  Response: improved  Pelvis:  Somatic Dysfunction:  Asymmetry  and Restriction  Severity :  2  Osteopathic Findings: Right innominate posterior rotation   Treatment Method: HVLA, ME and FPR  Response: improved    Rashmi Ramirez DO PGY-3  [address] [facility]

## 2019-08-27 ENCOUNTER — OFFICE VISIT (OUTPATIENT)
Dept: FAMILY MEDICINE CLINIC | Facility: CLINIC | Age: 44
End: 2019-08-27

## 2019-08-27 VITALS — WEIGHT: 229.6 LBS | BODY MASS INDEX: 36.03 KG/M2 | HEIGHT: 67 IN

## 2019-08-27 DIAGNOSIS — G43.909 MIGRAINE WITHOUT STATUS MIGRAINOSUS, NOT INTRACTABLE, UNSPECIFIED MIGRAINE TYPE: ICD-10-CM

## 2019-08-27 DIAGNOSIS — M54.40 CHRONIC MIDLINE LOW BACK PAIN WITH SCIATICA, SCIATICA LATERALITY UNSPECIFIED: ICD-10-CM

## 2019-08-27 DIAGNOSIS — M99.03 SOMATIC DYSFUNCTION OF LUMBAR REGION: ICD-10-CM

## 2019-08-27 DIAGNOSIS — M99.05 SOMATIC DYSFUNCTION OF PELVIS REGION: ICD-10-CM

## 2019-08-27 DIAGNOSIS — G89.29 CHRONIC MIDLINE LOW BACK PAIN WITH SCIATICA, SCIATICA LATERALITY UNSPECIFIED: ICD-10-CM

## 2019-08-27 DIAGNOSIS — M99.01 SOMATIC DYSFUNCTION OF CERVICAL REGION: Primary | ICD-10-CM

## 2019-08-27 DIAGNOSIS — M99.02 SOMATIC DYSFUNCTION OF THORACIC REGION: ICD-10-CM

## 2019-08-27 DIAGNOSIS — G47.00 INSOMNIA, UNSPECIFIED TYPE: ICD-10-CM

## 2019-08-27 RX ORDER — TRAZODONE HYDROCHLORIDE 150 MG/1
150 TABLET ORAL
Qty: 90 TABLET | Refills: 1 | Status: SHIPPED | OUTPATIENT
Start: 2019-08-27 | End: 2020-04-27

## 2019-08-27 RX ORDER — RIZATRIPTAN BENZOATE 10 MG/1
10 TABLET ORAL ONCE AS NEEDED
Qty: 9 TABLET | Refills: 0 | Status: SHIPPED | OUTPATIENT
Start: 2019-08-27 | End: 2019-10-07 | Stop reason: SDUPTHER

## 2019-08-27 RX ORDER — IBUPROFEN 600 MG/1
600 TABLET ORAL EVERY 6 HOURS PRN
Qty: 30 TABLET | Refills: 1 | Status: SHIPPED | OUTPATIENT
Start: 2019-08-27 | End: 2019-10-07 | Stop reason: SDUPTHER

## 2019-08-27 RX ORDER — TIZANIDINE 4 MG/1
4 TABLET ORAL EVERY 8 HOURS PRN
Qty: 90 TABLET | Refills: 1 | Status: SHIPPED | OUTPATIENT
Start: 2019-08-27 | End: 2021-12-02 | Stop reason: SDUPTHER

## 2019-08-27 NOTE — PROGRESS NOTES
The Assessment/Plan        Problem List Items Addressed This Visit        Cardiovascular and Mediastinum    Migraine without status migrainosus, not intractable     Requesting refills         Relevant Medications    traZODone (DESYREL) 150 mg tablet    tiZANidine (ZANAFLEX) 4 mg tablet    rizatriptan (MAXALT) 10 MG tablet    ibuprofen (MOTRIN) 600 mg tablet       Nervous and Auditory    Chronic midline low back pain with sciatica     Requesting refills         Relevant Medications    tiZANidine (ZANAFLEX) 4 mg tablet    ibuprofen (MOTRIN) 600 mg tablet       Other    Insomnia     Requesting refills         Relevant Medications    traZODone (DESYREL) 150 mg tablet      Other Visit Diagnoses     Somatic dysfunction of cervical region    -  Primary    Somatic dysfunction of thoracic region        Somatic dysfunction of lumbar region        Somatic dysfunction of pelvis region               This is a 37 y o  female who presents for OMT follow-up  1  Patient tolerated OMT well for the above problems, advised stretching and heat application for continued relief  2  OMT Follow up in 2 weeks  Elicia Aguilar is a 37 y o  female and is here for an OMT follow up  The patient reports she has no change in her neck pain or stiffness or back pain since last visit, continues to have intermittent headaches  She did notice improvement after her last OMT treatment, but then symptoms returned  She is currently extremely stressed and upset, tearful in the room because her daughter was previously murdered and trial starts next week  She is having a hard time focusing on anything else besides this, has not been able to get any of her testing done as discussed at previous visits but does need a refill of all of her medications today  Is the patient taking Pain medication? yes  Has the patient completed physical therapy for this condition? yes  Did Patient symptoms improve from last OMT appointment? yes    The following portions of the patient's history were reviewed and updated as appropriate: allergies, current medications, past family history, past medical history, past social history, past surgical history and problem list     Review of Systems  Review of Systems   Musculoskeletal: Positive for back pain (chronic), neck pain and neck stiffness  Negative for gait problem  As in HPI   Neurological: Positive for headaches           Objective     OMT Exam   Head:   Somatic Dysfunction:  Tissue Texture Changes and Restriction  Severity :  1  Osteopathic Findings: Frontal myofascial restriction  Treatment Method: MFR  Response: improved  Cervical:   Somatic Dysfunction:  Tissue Texture Changes, Asymmetry , Restriction and Tenderness  Severity :  3  Osteopathic Findings: Paraspinal hypertonicity bilaterally, myofascial restriction, decreased range of motion rotation and sidebending bilaterally  Treatment Method: ME, ST and MFR  Response: improved  Thoracic T1-4:   Somatic Dysfunction:  Tissue Texture Changes, Asymmetry , Restriction and Tenderness  Severity :  2  Osteopathic Findings: Paraspinal hypertonicity  Treatment Method: ST and MFR  Response: improved  Thoracic T5-9:   Somatic Dysfunction:  Tissue Texture Changes, Asymmetry , Restriction and Tenderness  Severity :  2  Osteopathic Findings: Paraspinal hypertonicity  Treatment Method: ST and MFR  Response: improved  Thoracic T10-12:  Somatic Dysfunction:  Tissue Texture Changes, Asymmetry , Restriction and Tenderness  Severity :  2  Osteopathic Findings: Paraspinal hypertonicity  Treatment Method: ST and MFR  Response: improved  Lumbar:  Somatic Dysfunction:  Tissue Texture Changes, Asymmetry , Restriction and Tenderness  Severity :  2  Osteopathic Findings: Paraspinal hypertonicity  Treatment Method: ST and MFR  Response: improved  Pelvis:  Somatic Dysfunction:  Asymmetry  and Restriction  Severity :  1  Osteopathic Findings: Left anterior innominate rotation  Treatment Method: ME  Response: resolved    Taj Velez DO PGY-3  [facility]

## 2019-09-10 ENCOUNTER — OFFICE VISIT (OUTPATIENT)
Dept: FAMILY MEDICINE CLINIC | Facility: CLINIC | Age: 44
End: 2019-09-10

## 2019-09-10 VITALS — BODY MASS INDEX: 36.38 KG/M2 | WEIGHT: 233 LBS

## 2019-09-10 DIAGNOSIS — M99.02 SOMATIC DYSFUNCTION OF THORACIC REGION: ICD-10-CM

## 2019-09-10 DIAGNOSIS — M99.01 SOMATIC DYSFUNCTION OF CERVICAL REGION: Primary | ICD-10-CM

## 2019-09-10 DIAGNOSIS — M99.00 SOMATIC DYSFUNCTION OF HEAD REGION: ICD-10-CM

## 2019-09-10 DIAGNOSIS — M99.03 SOMATIC DYSFUNCTION OF LUMBAR REGION: ICD-10-CM

## 2019-09-10 DIAGNOSIS — M99.05 SOMATIC DYSFUNCTION OF PELVIS REGION: ICD-10-CM

## 2019-09-10 NOTE — PROGRESS NOTES
The Assessment/Plan        Problem List Items Addressed This Visit     None      Visit Diagnoses     Somatic dysfunction of cervical region    -  Primary    Somatic dysfunction of thoracic region        Somatic dysfunction of lumbar region        Somatic dysfunction of pelvis region        Somatic dysfunction of head region               This is a 37 y o  female who presents for OMT follow-up  1  Patient tolerated OMT well for the above problems, advised stretching and heat application for continued relief  2  OMT Follow up in 2 weeks  Elicia Christianson is a 37 y o  female and is here for a OMT follow up  The patient reports she continues to have back pain on a daily basis, same quality as before  She also admits to neck pain and stiffness with intermittent headaches  This all seems to be worse during periods of stress, and lately she has been very stressed due to her daughter's trial being pushed back to the end of September  She has no new complaints today  Is the patient taking Pain medication? yes  Has the patient completed physical therapy for this condition? yes  Did Patient symptoms improve from last OMT appointment? yes    The following portions of the patient's history were reviewed and updated as appropriate: allergies, current medications, past family history, past medical history, past social history, past surgical history and problem list     Review of Systems  Review of Systems   Musculoskeletal: Positive for back pain, neck pain and neck stiffness  Negative for gait problem  As in HPI   Neurological: Positive for headaches           Objective     OMT Exam   Head:   Somatic Dysfunction:  Tissue Texture Changes, Asymmetry , Restriction and Tenderness  Severity :  1  Osteopathic Findings: Frontal myofascial restriction  Treatment Method: MFR  Response: improved  Cervical:   Somatic Dysfunction:  Tissue Texture Changes, Asymmetry , Restriction and Tenderness  Severity : 2  Osteopathic Findings: Paraspinal hypertonicity bilaterally, myofascial restriction, decreased range of motion rotation and side bending bilaterally  Treatment Method: ME, ST, MFR and OA release  Response: improved  Thoracic T1-4:   Somatic Dysfunction:  Tissue Texture Changes, Asymmetry , Restriction and Tenderness  Severity :  2  Osteopathic Findings: Trapezius hypertonicity bilaterally  Treatment Method: ME, ST and MFR  Response: improved  Thoracic T5-9:   Somatic Dysfunction:  Tissue Texture Changes, Asymmetry , Restriction and Tenderness  Severity :  2  Osteopathic Findings: Paraspinal hypertonicity, myofascial restriction  Treatment Method: ST and MFR  Response: improved  Thoracic T10-12:  Somatic Dysfunction:  Tissue Texture Changes, Asymmetry , Restriction and Tenderness  Severity :  2  Osteopathic Findings: Paraspinal hypertonicity, myofascial restriction  Treatment Method: ST and MFR  Response: improved  Lumbar:  Somatic Dysfunction:  Tissue Texture Changes, Asymmetry , Restriction and Tenderness  Severity :  2  Osteopathic Findings: Paraspinal hypertonicity, myofascial restriction  Treatment Method: ST and MFR  Response: improved  Pelvis:  Somatic Dysfunction:  Tissue Texture Changes, Asymmetry , Restriction and Tenderness  Severity :  1  Osteopathic Findings: Left anterior innominate rotation  Treatment Method: ME  Response: resolved    Carolina Cerise, DO PGY-3  [facility]  [address]

## 2019-09-17 ENCOUNTER — HOSPITAL ENCOUNTER (OUTPATIENT)
Dept: NON INVASIVE DIAGNOSTICS | Facility: HOSPITAL | Age: 44
Discharge: HOME | End: 2019-09-17
Payer: COMMERCIAL

## 2019-09-17 DIAGNOSIS — R01.1 SYSTOLIC MURMUR: ICD-10-CM

## 2019-09-17 PROCEDURE — 93306 TTE W/DOPPLER COMPLETE: CPT | Performed by: INTERNAL MEDICINE

## 2019-09-17 PROCEDURE — 93306 TTE W/DOPPLER COMPLETE: CPT

## 2019-10-07 ENCOUNTER — CONSULT (OUTPATIENT)
Dept: BARIATRICS | Facility: CLINIC | Age: 44
End: 2019-10-07
Payer: COMMERCIAL

## 2019-10-07 ENCOUNTER — TELEPHONE (OUTPATIENT)
Dept: FAMILY MEDICINE CLINIC | Facility: CLINIC | Age: 44
End: 2019-10-07

## 2019-10-07 ENCOUNTER — OFFICE VISIT (OUTPATIENT)
Dept: FAMILY MEDICINE CLINIC | Facility: CLINIC | Age: 44
End: 2019-10-07

## 2019-10-07 VITALS
TEMPERATURE: 99.4 F | WEIGHT: 230.8 LBS | RESPIRATION RATE: 16 BRPM | HEIGHT: 67 IN | DIASTOLIC BLOOD PRESSURE: 80 MMHG | BODY MASS INDEX: 36.22 KG/M2 | HEART RATE: 82 BPM | SYSTOLIC BLOOD PRESSURE: 110 MMHG

## 2019-10-07 VITALS
WEIGHT: 229.2 LBS | SYSTOLIC BLOOD PRESSURE: 160 MMHG | HEART RATE: 109 BPM | RESPIRATION RATE: 17 BRPM | DIASTOLIC BLOOD PRESSURE: 100 MMHG | HEIGHT: 68 IN | TEMPERATURE: 98.5 F | BODY MASS INDEX: 34.74 KG/M2

## 2019-10-07 DIAGNOSIS — F31.9 BIPOLAR DISORDER (HCC): ICD-10-CM

## 2019-10-07 DIAGNOSIS — G43.909 MIGRAINE WITHOUT STATUS MIGRAINOSUS, NOT INTRACTABLE, UNSPECIFIED MIGRAINE TYPE: ICD-10-CM

## 2019-10-07 DIAGNOSIS — M72.2 PLANTAR FASCIITIS, BILATERAL: ICD-10-CM

## 2019-10-07 DIAGNOSIS — E66.9 OBESITY (BMI 30.0-34.9): Primary | ICD-10-CM

## 2019-10-07 DIAGNOSIS — R01.1 SYSTOLIC MURMUR: ICD-10-CM

## 2019-10-07 DIAGNOSIS — G89.29 CHRONIC MIDLINE LOW BACK PAIN WITH SCIATICA, SCIATICA LATERALITY UNSPECIFIED: ICD-10-CM

## 2019-10-07 DIAGNOSIS — Z71.6 ENCOUNTER FOR SMOKING CESSATION COUNSELING: Primary | ICD-10-CM

## 2019-10-07 DIAGNOSIS — E66.9 OBESITY (BMI 30.0-34.9): ICD-10-CM

## 2019-10-07 DIAGNOSIS — R03.0 ELEVATED BLOOD PRESSURE READING: ICD-10-CM

## 2019-10-07 DIAGNOSIS — M79.7 FIBROMYALGIA: ICD-10-CM

## 2019-10-07 DIAGNOSIS — E66.9 CLASS 2 OBESITY WITH BODY MASS INDEX (BMI) OF 36.0 TO 36.9 IN ADULT, UNSPECIFIED OBESITY TYPE, UNSPECIFIED WHETHER SERIOUS COMORBIDITY PRESENT: ICD-10-CM

## 2019-10-07 DIAGNOSIS — M54.40 CHRONIC MIDLINE LOW BACK PAIN WITH SCIATICA, SCIATICA LATERALITY UNSPECIFIED: ICD-10-CM

## 2019-10-07 DIAGNOSIS — G47.00 INSOMNIA, UNSPECIFIED TYPE: ICD-10-CM

## 2019-10-07 DIAGNOSIS — G43.909 MIGRAINE WITHOUT STATUS MIGRAINOSUS, NOT INTRACTABLE, UNSPECIFIED MIGRAINE TYPE: Primary | ICD-10-CM

## 2019-10-07 PROCEDURE — 99203 OFFICE O/P NEW LOW 30 MIN: CPT | Performed by: FAMILY MEDICINE

## 2019-10-07 PROCEDURE — 99213 OFFICE O/P EST LOW 20 MIN: CPT | Performed by: FAMILY MEDICINE

## 2019-10-07 RX ORDER — VARENICLINE TARTRATE 1 MG/1
1 TABLET, FILM COATED ORAL 2 TIMES DAILY
Qty: 60 TABLET | Refills: 3 | Status: SHIPPED | OUTPATIENT
Start: 2019-10-07 | End: 2021-06-11

## 2019-10-07 RX ORDER — IBUPROFEN 600 MG/1
600 TABLET ORAL EVERY 6 HOURS PRN
Qty: 30 TABLET | Refills: 1 | Status: SHIPPED | OUTPATIENT
Start: 2019-10-07 | End: 2021-06-11

## 2019-10-07 RX ORDER — TOPIRAMATE 25 MG/1
25 TABLET ORAL DAILY
Qty: 30 TABLET | Refills: 1 | Status: SHIPPED | OUTPATIENT
Start: 2019-10-07 | End: 2019-10-07

## 2019-10-07 RX ORDER — RIZATRIPTAN BENZOATE 10 MG/1
10 TABLET ORAL ONCE AS NEEDED
Qty: 9 TABLET | Refills: 0 | Status: SHIPPED | OUTPATIENT
Start: 2019-10-07 | End: 2020-11-03 | Stop reason: SDUPTHER

## 2019-10-07 RX ORDER — TOPIRAMATE 25 MG/1
TABLET ORAL
Qty: 180 TABLET | Refills: 1 | Status: SHIPPED | OUTPATIENT
Start: 2019-10-07 | End: 2020-01-02 | Stop reason: SDUPTHER

## 2019-10-07 RX ORDER — TOPIRAMATE 25 MG/1
TABLET ORAL
Qty: 180 TABLET | Refills: 1 | OUTPATIENT
Start: 2019-10-07

## 2019-10-07 RX ORDER — TOPIRAMATE 25 MG/1
25 TABLET ORAL 2 TIMES DAILY
Qty: 60 TABLET | Refills: 1 | Status: SHIPPED | OUTPATIENT
Start: 2019-10-07 | End: 2019-10-07 | Stop reason: SDUPTHER

## 2019-10-07 RX ORDER — TOPIRAMATE 25 MG/1
25 TABLET ORAL 2 TIMES DAILY
Qty: 30 TABLET | Refills: 1 | Status: SHIPPED | OUTPATIENT
Start: 2019-10-07 | End: 2019-10-07 | Stop reason: SDUPTHER

## 2019-10-07 NOTE — TELEPHONE ENCOUNTER
Please let the patient know bariatric medicine canceled the order for Topamax because she told them she will not be following with them- will send Topamax to the pharmacy for migraine prophylaxis  She is to take 25 mg at bedtime for the first week and then twice daily

## 2019-10-07 NOTE — ASSESSMENT & PLAN NOTE
- Has cut amount of cigarettes smoked daily in half, completed Chantix starting pack  - Will continue Chantix 1 mg BID for an additional 12 weeks

## 2019-10-07 NOTE — TELEPHONE ENCOUNTER
----- Message from Anahi Phipps MD sent at 10/7/2019  1:39 PM EDT -----    Tiffanie Blakely  Can you call her pharmacy and cancelled her Topamax  (see note below)       thanks    ----- Message -----  From: Hay Miller MA  Sent: 10/7/2019  12:05 PM EDT  To: Anahi Phipps MD    Pt stopped at checkout to hand the folder back to us and said to cancel the prescription you were going to giver her, she decided she will not be coming back to us

## 2019-10-07 NOTE — PROGRESS NOTES
Assessment/Plan:       Problem List Items Addressed This Visit        Cardiovascular and Mediastinum    Migraine without status migrainosus, not intractable     - Requesting refill   - Topamax started per bariatric medicine, 25 mg daily migraine prophylaxis          Relevant Medications    rizatriptan (MAXALT) 10 MG tablet    ibuprofen (MOTRIN) 600 mg tablet       Musculoskeletal and Integument    Plantar fasciitis, bilateral     - Supportive care discussed including supportive foot ware, orthotics, stretching and frozen water bottle to massage feet            Other    Chronic low back pain     Requesting refill         Relevant Medications    ibuprofen (MOTRIN) 600 mg tablet    Systolic murmur     - ECHO reviewed, systolic function normal EF 55%, G1DD, mild aortic regurgitation   - Asymptomatic, no further work-up at this time          Class 2 obesity with body mass index (BMI) of 36 0 to 36 9 in adult     - Likely secondary to excess calories, dietary modification and exercise discussed in detail  - TSH 0 918 normal 4/2019   - Patient had appointment with bariatrics today however does not plan on pursuing their recommendations or medication options   - Advised to try faithful dieting regimen and calorie tracking over the next 4 weeks as well as at least 150 minutes of moderate intensity exercise weekly and will monitor weight   - Offered other bariatric medicine resources, patient declines          Encounter for smoking cessation counseling - Primary     - Has cut amount of cigarettes smoked daily in half, completed Chantix starting pack  - Will continue Chantix 1 mg BID for an additional 12 weeks         Relevant Medications    varenicline (CHANTIX) 1 mg tablet            Subjective:      Patient ID: Ximena James is a 53 y o  female presenting to discuss weight gain and for results of ECHO  The patient presents to discuss results of her recent ECHO    She is also upset today because she continues to be unable to lose weight, count her calories daily and eats around 1200, although admits to not counting certain things such as coffee with sugar in cream   She is not exercising regularly currently due to both of her feet hurting, but she states she is active at her job at the [employer]  She had an appointment with bariatric medicine today but states that it did not go that she hoped it would, she does not plan on going back and is wondering what else she should do  She states that the physician she saw today started her on Topamax  She is currently smoking 1 pack of cigarettes every 2 to 3 days, which is less from 1 pack per day before she started Chantix  The following portions of the patient's history were reviewed and updated as appropriate: allergies, current medications, past family history, past medical history, past social history, past surgical history and problem list     Review of Systems   Constitutional: Positive for fatigue and unexpected weight change  Negative for activity change, appetite change, chills and fever  Respiratory: Negative for cough and shortness of breath  Cardiovascular: Negative for chest pain, palpitations and leg swelling  Gastrointestinal: Negative for abdominal pain, constipation, diarrhea, nausea and vomiting  Genitourinary: Negative for decreased urine volume and difficulty urinating  Musculoskeletal: Positive for back pain (chronic)  Negative for gait problem  Bilateral foot pain    Skin: Negative for rash  Neurological: Negative for dizziness, weakness, light-headedness, numbness and headaches           Objective:      /80   Pulse 82   Temp 99 4 °F (37 4 °C)   Resp 16   Ht 5' 7 1" (1 704 m)   Wt 105 kg (230 lb 12 8 oz)   LMP 01/05/2005   BMI 36 04 kg/m²     Wt Readings from Last 3 Encounters:   10/07/19 105 kg (230 lb 12 8 oz)   10/07/19 104 kg (229 lb 3 2 oz)   09/10/19 106 kg (233 lb)          Physical Exam   Constitutional: She is oriented to person, place, and time  She appears well-developed and well-nourished  No distress  HENT:   Head: Normocephalic and atraumatic  Neck: Neck supple  Cardiovascular: Normal rate, regular rhythm and intact distal pulses  Pulmonary/Chest: Breath sounds normal  No stridor  No respiratory distress  She has no wheezes  She has no rales  Musculoskeletal: She exhibits no edema  Neurological: She is alert and oriented to person, place, and time  She exhibits normal muscle tone  Skin: Skin is warm  She is not diaphoretic  Psychiatric: She has a normal mood and affect  Her behavior is normal    Vitals reviewed          Elzbieta Jama DO PGY-3   Jadyn Roger

## 2019-10-07 NOTE — ASSESSMENT & PLAN NOTE
- Supportive care discussed including supportive foot ware, orthotics, stretching and frozen water bottle to massage feet

## 2019-10-07 NOTE — PROGRESS NOTES
Assessment/Plan:        Diagnoses and all orders for this visit:    Obesity (BMI 30 0-34 9)  -     Discontinue: topiramate (TOPAMAX) 25 mg tablet; Take 1 tablet (25 mg total) by mouth 2 (two) times a day  -     topiramate (TOPAMAX) 25 mg tablet; Take 1 tablet (25 mg total) by mouth daily    Bipolar disorder (HCC)    Migraine without status migrainosus, not intractable, unspecified migraine type  -     Discontinue: topiramate (TOPAMAX) 25 mg tablet; Take 1 tablet (25 mg total) by mouth 2 (two) times a day  -     topiramate (TOPAMAX) 25 mg tablet; Take 1 tablet (25 mg total) by mouth daily    Fibromyalgia    Insomnia, unspecified type    Elevated blood pressure reading      -Discussed options of [facility]-Intensive Lifestyle Intervention Program, Very Low Calorie Diet-VLCD and Conservative Program and the role of weight loss medications   -Initial weight loss goal of 5-10% weight loss for improved health  -Screening labs UTD  -Patient is interested in pursuing Conservative Program   - Will start Topamax, pt agrees to try  Reviewed the potential side effects of topiramate, which may include numbness or tingling, fatigue, upper respiratory infection symptoms, depression/anxiety, changes in taste, abdominal upset/heartburn, and trouble sleeping  Goals:  Food log (ie ) www myfitnesspal com,sparkpeople  com,loseit com,calorieking  com,etc  baritastic  No sugary beverages  At least 64oz of water daily  Increase physical activity by 10 minutes daily  Gradually increase physical activity to a goal of 5 days per week for 30 minutes of MODERATE intensity PLUS 2 days per week of FULL BODY resistance training  45 minute visit, >50% face-to-face time spent counseling patient on nonsurgical interventions for the treatment of excess weight    Discussed in detail nonsurgical options including intensive lifestyle intervention program, very low-calorie diet program and conservative program   Discussed the role of weight loss medications  Counseled patient on diet behavior and exercise modification for weight loss  Pt very emotional and defensive when asked questions  States very frustrated  Agrees to try Topamax for weight loss and migraines but specifies she is not interested in medicines that could cause her side effects, she has had bad experiences in the past with psych meds  Follow up in approximately 2 months with Non-Surgical Physician/Advanced Practitioner  Subjective:   Chief Complaint   Patient presents with    Consult     mwm consult  Patient ID: Sania Madden  is a 53 y o  female with excess weight/obesity here to pursue weight management  Past Medical History:   Diagnosis Date    Abnormal uterine bleeding     Back pain     Back pain with sciatica     Cancer (HCC)     cervical    Chronic pain     Chronic pain disorder     back & neck    Class 2 obesity with body mass index (BMI) of 36 0 to 36 9 in adult 2019    Endometriosis     Fibromyalgia, primary     Insomnia     Migraine        HPI:     Pt presents by referral from PCP  Obesity/Excess Weight:  Severity: class 1  Onset: few years ago     Modifiers: Diet and Exercise  Contributing factors: Insufficient Physical Activity, Stress/Emotional Eating and Depression  Associated symptoms: comorbid conditions and fatigue    Goals: 135lbs  Hydration:  Alcohol: no    Smoke- <1ppd started chantix 1 month ago  Exercise: 30 cardio every other day treadmill  Sleep: 7hrs  STOP ban/8    She used Topmax in the past for migraines, tolerated well was DC since her headaches were controlled and resolved  She now notes more frequent headaches, she was started on Maxalt with some improvement  Past use of wellbutrin no  Recently started on Chantix for smoking cessation    She states has not changed her diet, she measures her food, She doesn't snack    She is very frustrated as she feels she eats healthy and its gaining weight for no specific reason  She does admit to skipping meals due to time constrains  She also mentions she started gaining weight after a previous traumatic event but she didn't specify details  TSH was normal    Lives with daughter and grandkids   Works full time in [place]  The following portions of the patient's history were reviewed and updated as appropriate: allergies, current medications, past family history, past medical history, past social history, past surgical history and problem list     Review of Systems   Constitutional: Negative for activity change and appetite change  Respiratory: Negative  Cardiovascular: Negative  Gastrointestinal: Negative  Genitourinary: Negative  Musculoskeletal: Negative for arthralgias  Skin: Negative for rash  Psychiatric/Behavioral: Negative  Objective:    /100 (BP Location: Left arm, Patient Position: Sitting, Cuff Size: Large)   Pulse (!) 109   Temp 98 5 °F (36 9 °C) (Tympanic)   Resp 17   Ht 5' 8" (1 727 m)   Wt 104 kg (229 lb 3 2 oz)   LMP 01/05/2005   BMI 34 85 kg/m²     Physical Exam     Constitutional   General appearance: Abnormal   well developed and obese  Eyes No conjunctival pallor  Ears, Nose, Mouth, and Throat Oral mucosa moist    Pulmonary   Respiratory effort: No increased work of breathing or signs of respiratory distress  Auscultation of lungs: Clear to auscultation, equal breath sounds bilaterally, no wheezes, no rales, no rhonci  Cardiovascular   Auscultation of heart: Normal rate and rhythm, normal S1 and S2, without murmurs  Examination of extremities for edema and/or varicosities: Normal   no edema  Abdomen   Abdomen: Abnormal   The abdomen was obese  Bowel sounds were normal  The abdomen was soft and nontender     Musculoskeletal   Gait and station: Normal     Psychiatric   Orientation to person, place and time: Normal     Affect: appropriate

## 2019-10-07 NOTE — ASSESSMENT & PLAN NOTE
- Likely secondary to excess calories, dietary modification and exercise discussed in detail  - TSH 0 918 normal 4/2019   - Patient had appointment with bariatrics today however does not plan on pursuing their recommendations or medication options   - Advised to try faithful dieting regimen and calorie tracking over the next 4 weeks as well as at least 150 minutes of moderate intensity exercise weekly and will monitor weight   - Offered other bariatric medicine resources, patient declines

## 2019-10-07 NOTE — ASSESSMENT & PLAN NOTE
- ECHO reviewed, systolic function normal EF 55%, G1DD, mild aortic regurgitation   - Asymptomatic, no further work-up at this time

## 2019-10-08 ENCOUNTER — OFFICE VISIT (OUTPATIENT)
Dept: FAMILY MEDICINE CLINIC | Facility: CLINIC | Age: 44
End: 2019-10-08

## 2019-10-08 VITALS — BODY MASS INDEX: 36.22 KG/M2 | HEIGHT: 67 IN | WEIGHT: 230.8 LBS

## 2019-10-08 DIAGNOSIS — G89.29 CHRONIC BILATERAL LOW BACK PAIN, UNSPECIFIED WHETHER SCIATICA PRESENT: Primary | ICD-10-CM

## 2019-10-08 DIAGNOSIS — M54.50 CHRONIC BILATERAL LOW BACK PAIN, UNSPECIFIED WHETHER SCIATICA PRESENT: Primary | ICD-10-CM

## 2019-10-08 DIAGNOSIS — M99.02 SOMATIC DYSFUNCTION OF THORACIC REGION: ICD-10-CM

## 2019-10-08 DIAGNOSIS — M99.01 SOMATIC DYSFUNCTION OF CERVICAL REGION: ICD-10-CM

## 2019-10-08 DIAGNOSIS — M99.03 SOMATIC DYSFUNCTION OF LUMBAR REGION: ICD-10-CM

## 2019-10-08 NOTE — ASSESSMENT & PLAN NOTE
- Patient was previously following with pain medicine and would like to reestablish, referral provided

## 2019-10-08 NOTE — PROGRESS NOTES
The Assessment/Plan        Problem List Items Addressed This Visit        Other    Chronic low back pain - Primary     - Patient was previously following with pain medicine and would like to reestablish, referral provided          Relevant Orders    Ambulatory referral to Pain Management      Other Visit Diagnoses     Somatic dysfunction of lumbar region        Somatic dysfunction of thoracic region        Somatic dysfunction of cervical region               This is a 53 y o  female who presents for OMT follow-up  1  Patient tolerated OMT well for the above problems, advised stretching and heat application for continued relief  2  OMT Follow up in 2 weeks  Elicia Parks is a 53 y o  female and is here for a OMT follow up  The patient reports continued low back pain with intermittent hip pain, worse during rainy days, same quality of low back pain as previously  She also admits to neck and shoulder pain, mild headache today  She has no new complaints today  She is requesting a referral to pain management as she would like to reestablish with them, frustrated with the amount of pain she has on a daily basis in addition to fibromyalgia, but states OMT is helping and she would like to continue  Is the patient taking Pain medication? yes  Has the patient completed physical therapy for this condition? yes  Did Patient symptoms improve from last OMT appointment? yes    The following portions of the patient's history were reviewed and updated as appropriate: allergies, current medications, past family history, past medical history, past social history, past surgical history and problem list     Review of Systems  Review of Systems   Musculoskeletal: Positive for back pain, neck pain and neck stiffness  Negative for gait problem  Neurological: Positive for headaches           Objective     OMT Exam   Cervical:   Somatic Dysfunction:  Tissue Texture Changes, Asymmetry , Restriction and Tenderness  Severity :  1  Osteopathic Findings: OA myofascial restriction, paraspinal myofascial restriction  Treatment Method: ST and MFR  Response: improved  Thoracic T1-4:   Somatic Dysfunction:  Tissue Texture Changes, Asymmetry , Restriction and Tenderness  Severity :  2  Osteopathic Findings: Trapezius hypertonicity bilaterally, myofascial restriction  Treatment Method: ST and MFR  Response: improved  Thoracic T5-9:   Somatic Dysfunction:  Tissue Texture Changes, Asymmetry , Restriction and Tenderness  Severity :  2  Osteopathic Findings: Paraspinal hypertonicity  Treatment Method: ST and MFR  Response: improved  Thoracic T10-12:  Somatic Dysfunction:  Tissue Texture Changes, Asymmetry , Restriction and Tenderness  Severity :  2  Osteopathic Findings: Paraspinal hypertonicity R>L, ropy musculature, myofascial rstriction   Treatment Method: ME, ST, MFR and direct inhibition  Response: improved  Lumbar:  Somatic Dysfunction:  Tissue Texture Changes, Asymmetry , Restriction and Tenderness  Severity :  3  Osteopathic Findings: Paraspinal hypertonicity R>L, ropy musculature, myofascial rstriction  Treatment Method: ME, ST, MFR and direct inhibition  Response: improved  Pelvis:  Somatic Dysfunction:  Tissue Texture Changes, Asymmetry , Restriction and Tenderness  Severity :  1  Osteopathic Findings: Left posterior innominate rotation  Treatment Method: HVLA  Response: resolved    Cierra Juarez DO PGY-3  [address] [facility]

## 2019-10-31 ENCOUNTER — OFFICE VISIT (OUTPATIENT)
Dept: FAMILY MEDICINE CLINIC | Facility: CLINIC | Age: 44
End: 2019-10-31

## 2019-10-31 VITALS — HEIGHT: 67 IN | WEIGHT: 233.8 LBS | BODY MASS INDEX: 36.7 KG/M2

## 2019-10-31 DIAGNOSIS — M99.02 SOMATIC DYSFUNCTION OF THORACIC REGION: ICD-10-CM

## 2019-10-31 DIAGNOSIS — M99.03 SOMATIC DYSFUNCTION OF LUMBAR REGION: Primary | ICD-10-CM

## 2019-10-31 DIAGNOSIS — M99.06 SOMATIC DYSFUNCTION OF LOWER EXTREMITY: ICD-10-CM

## 2019-10-31 DIAGNOSIS — M99.05 SOMATIC DYSFUNCTION OF PELVIS REGION: ICD-10-CM

## 2019-10-31 NOTE — PROGRESS NOTES
The Assessment/Plan        Problem List Items Addressed This Visit     None      Visit Diagnoses     Somatic dysfunction of lumbar region    -  Primary    Somatic dysfunction of thoracic region        Somatic dysfunction of pelvis region        Somatic dysfunction of lower extremity               This is a 53 y o  female who presents for OMT follow-up  1  Patient tolerated OMT well for the above problems, advised stretching and heat application for continued relief  2  OMT Follow up in 2 weeks  Of note, the patient is frustrated today that her weight continues to increase despite dieting measures  She states she would like to talk about this more at our follow-up appointment in November, and in the meantime she will continue to work on portion control and calorie cutting  She also is asking about if it is safe to take a probiotic supplement, and I reassured her and answered all questions  Subjective     Jimmy Stewart is a 53 y o  female and is here for a OMT follow up  The patient reports she has had a lot of back pain recently as well as bilateral hip pain and pain on the bottom of her feet  She got new, more supportive shoes with arch support  She states she is having a flare of her fibromyalgia, and feeling more pain because work at [employer] is very busy with the holidays coming  She has no new complaints today  Is the patient taking Pain medication? yes  Has the patient completed physical therapy for this condition? yes  Did Patient symptoms improve from last OMT appointment? yes    The following portions of the patient's history were reviewed and updated as appropriate: allergies, current medications, past family history, past medical history, past social history, past surgical history and problem list     Review of Systems  Review of Systems   Musculoskeletal: Positive for back pain  Negative for gait problem          Bilateral hip pain, bilateral foot pain          Objective     OMT Exam   Thoracic T5-9:   Somatic Dysfunction:  Tissue Texture Changes, Asymmetry , Restriction and Tenderness  Severity :  2  Osteopathic Findings: Paraspinal hypertonicity bilaterally, myofascial restriction  Treatment Method: ST, MFR and direct inhibition  Response: improved  Thoracic T10-12:  Somatic Dysfunction:  Tissue Texture Changes, Asymmetry , Restriction and Tenderness  Severity :  2  Osteopathic Findings: Paraspinal hypertonicity bilaterally, myofascial restriction  Treatment Method: ST, MFR and direct inhibition  Response: improved  Lumbar:  Somatic Dysfunction:  Tissue Texture Changes, Asymmetry , Restriction and Tenderness  Severity :  2  Osteopathic Findings: Paraspinal hypertonicity bilaterally with ropy musculature, myofascial restriction, right lower pole tender point   Treatment Method: ST, MFR, CS and direct inhibition  Response: improved  Pelvis:  Somatic Dysfunction:  Tissue Texture Changes, Asymmetry , Restriction and Tenderness  Severity :  2  Osteopathic Findings: Left posterior innominate rotation   Treatment Method: HVLA  Response: resolved  Left Lower Extremity:  Somatic Dysfunction:  Tissue Texture Changes, Asymmetry , Restriction and Tenderness  Severity :  2  Osteopathic Findings: Plantar myofascial restriction  Treatment Method: MFR  Response: improved  Right Lower Extremity:  Somatic Dysfunction:  Tissue Texture Changes, Asymmetry , Restriction and Tenderness  Severity :  2  Osteopathic Findings: Plantar myofascial restriction  Treatment Method: MFR  Response: improved    Cecil Tran DO PGY-3  [address] [facility]

## 2020-01-02 ENCOUNTER — OFFICE VISIT (OUTPATIENT)
Dept: FAMILY MEDICINE CLINIC | Facility: CLINIC | Age: 45
End: 2020-01-02

## 2020-01-02 VITALS
BODY MASS INDEX: 36 KG/M2 | DIASTOLIC BLOOD PRESSURE: 90 MMHG | RESPIRATION RATE: 18 BRPM | HEART RATE: 78 BPM | HEIGHT: 67 IN | TEMPERATURE: 98.6 F | WEIGHT: 229.4 LBS | SYSTOLIC BLOOD PRESSURE: 150 MMHG

## 2020-01-02 DIAGNOSIS — R14.0 ABDOMINAL BLOATING: ICD-10-CM

## 2020-01-02 DIAGNOSIS — R63.8 UNABLE TO LOSE WEIGHT: ICD-10-CM

## 2020-01-02 DIAGNOSIS — G47.00 INSOMNIA, UNSPECIFIED TYPE: Primary | ICD-10-CM

## 2020-01-02 DIAGNOSIS — G43.909 MIGRAINE WITHOUT STATUS MIGRAINOSUS, NOT INTRACTABLE, UNSPECIFIED MIGRAINE TYPE: ICD-10-CM

## 2020-01-02 PROCEDURE — 3008F BODY MASS INDEX DOCD: CPT | Performed by: FAMILY MEDICINE

## 2020-01-02 PROCEDURE — 99213 OFFICE O/P EST LOW 20 MIN: CPT | Performed by: FAMILY MEDICINE

## 2020-01-02 PROCEDURE — 4004F PT TOBACCO SCREEN RCVD TLK: CPT | Performed by: FAMILY MEDICINE

## 2020-01-02 RX ORDER — TOPIRAMATE 50 MG/1
50 TABLET, FILM COATED ORAL 2 TIMES DAILY
Qty: 60 TABLET | Refills: 2 | Status: SHIPPED | OUTPATIENT
Start: 2020-01-02 | End: 2021-06-11 | Stop reason: SDUPTHER

## 2020-01-02 NOTE — PATIENT INSTRUCTIONS
[facility]      Nutrition: How to Make Saima 1006  A healthy diet has a lot of benefits  It can prevent certain health conditions like heart disease and cancer, and it can lower your cholesterol  It can give you more energy, help you focus, and improve your mood  It can also help you lose weight or stay at a healthy weight  Path to Improved Health  The choices you make about what you eat and drink matter  They should add up to a balanced, nutritious diet  We all have different calorie needs based on our age, sex, and activity level  Health conditions can have a role, too  Fruits and Vegetables  Fruits and vegetables are rich in fiber, vitamins, and minerals  They should be the basis of your diet  Try to get many different colors of fruits and vegetables each day to add flavor and variety  Fruits and vegetables should cover half of your plate at each meal  Try not to add saturated fats and sugar to vegetables and fruits  This means avoiding margarine, butter, mayonnaise, and sour cream  You can use yogurt, healthy oils (such as canola or olive oil), or herbs instead  Potatoes and corn are not considered vegetables  Your body processes them more like grains  FRUITS & VEGETABLES   INSTEAD OF THIS: TRY THIS:   Regular or fried vegetables served with cream, cheese, or butter Raw, steamed, boiled, sautéed, or baked vegetables tossed with olive oil, salt, and pepper, or with onions or spices added (like garlic and cumin)   Fruits served with cream cheese or sugary sauces Fresh fruit with peanut, almond, or cashew butter or plain yogurt   Fried potatoes, including french fries, hash browns, and potato chips Baked sweet potatoes or other vegetables     Grains  Choose products that list whole grains as the first ingredient  Whole grains are high in fiber, protein, and vitamins  They are digested slowly, which helps you feel full longer and keeps you from overeating   Avoid products that say enriched    Hot cereals like oatmeal are usually low in saturated fat  However, instant cereals with cream may contain processed oils and can be high in sugar  Granola cereals usually contain a lot of sugar  Cold cereals are generally made with refined grains and are high in sugars  Look for whole-grain, low-sugar options instead  Try not to eat rich sweets, such as doughnuts, rolls, and muffins  Consider fruit or a piece of dark chocolate instead to satisfy your sweet tooth  GRAINS   INSTEAD OF THIS: TRY THIS:   Croissants, rolls, biscuits, and white breads Whole-grain breads, including wheat, rye, and pumpernickel   Doughnuts, pastries, and scones Whole-grain English muffins and small whole-grain bagels   Fried tortillas Soft tortillas (corn or whole wheat) without trans fats   Sugary cereals and regular granola Whole-grain cereal, oatmeal, and reduced-sugar granola   Snack crackers Whole-grain crackers   Potato or corn chips and buttered popcorn Unbuttered popcorn   White pasta Whole-wheat pasta   White rice Brown or wild rice   Fried rice or pasta mixes Brown rice or whole-grain pasta with low-sodium vegetable sauce   All-purpose white flour Whole-wheat flour     Protein  Protein can come from animal and vegetable sources  People who get more of their protein from animal sources tend to have more health problems that can lead to illness and early death  It is healthier to eat meat less often and get most of your protein from plant sources  When you eat meat, choose leaner cuts  Vegetable Protein Sources  There are many ways to get protein in your diet even if you do not eat meat  Most vegetables have some protein  When you eat these vegetables with whole grains, seeds, nuts, and especially beans, you can get a good amount of protein  You can swap beans for meat in recipes like lasagna or chili  Soy foods such as tofu, tempeh, and edamame are also good sources of protein      Beef, Pork, Veal, and [employer] and veal cuts have the words loin or round in their names  Lean pork cuts have the words loin or leg in their names  Trim off the outside fat before cooking the meat  Trim any inside fat before eating it  Use herbs, spices, and low-sodium marinades to season meat  Baking, broiling, grilling, and roasting are the healthiest ways to cook meats  Lean cuts can be panbroiled or stir-fried  Use a nonstick pan, canola oil, or olive oil instead of butter or margarine  Don't serve meat with high-fat sauces and gravies  Poultry  Chicken breasts are a good choice because they are low in fat and high in protein  Only eat duck and goose once in a while, because they are higher in saturated fat  Remove skin and visible fat before cooking  Baking, broiling, grilling, and roasting are the healthiest ways to [employer]  Skinless poultry can be pan broiled or stir fried  Use a nonstick pan, canola oil, or olive oil instead of butter or margarine  Seafood  Most seafood is high in healthy polyunsaturated fats  Healthy omega-3 fatty acids also are found in some fish, such as salmon and cold-water trout  If good-quality fresh fish isn't available, buy frozen fish  To prepare fish, you should poach, steam, bake, broil, or grill it      PROTEIN   INSTEAD OF THIS: TRY THIS:   Prime and marbled cuts of meat Select-grade lean beef, such as round, sirloin, and loin cuts   Pork spare ribs and lei Lean pork, such as tenderloin and loin chop, turkey lei, tofu lei   Regular ground beef Lean or extra-lean ground beef, ground chicken or turkey, tempeh, or beans   Lunch meats, such as pepperoni, salami, bologna, and liverwurst Lean lunch meats, such as turkey, chicken, and ham   Regular hot dogs and sausage Fat-free hot dogs, turkey dogs, tofu hot dogs   Breaded fish sticks and cakes, fish canned in oil, or seafood prepared with butter or served with high-fat sauce Fish (fresh, frozen, or canned in water), grilled fish sticks and cakes, or shellfish     Dairy  Choose low-fat, skim, or nondairy milk, such as soy, rice, or almond milk  Try low-fat or part-skim cheeses and other dairy products, or choose smaller portions of foods high in saturated fat  Yogurt can replace sour cream in many recipes  It is important to pick yogurt without added sugar  Try mixing yogurt with fruit for dessert  Sorbet and frozen yogurt are lower in fat than ice cream     DAIRY   INSTEAD OF THIS: TRY THIS:   Whole milk Skim (nonfat), 1% or 2% (low fat), or nondairy milk, such as soy, rice, almond, or cashew milk   Cream or evaporated milk Evaporated skim milk   Regular buttermilk Low-fat buttermilk   Yogurt made with whole milk Low-fat or nonfat yogurt   Regular cheese, including American, blue, Brie, cheddar, Luisito, and Parmesan Low-fat cheese with less than 3 g fat per serving, or nondairy soy cheese   Regular cottage cheese Low-fat cottage cheese (less than 2% fat)   Regular cream cheese Low-fat cream cheese with less than 3 g fat per 1 oz, or skim ricotta   Ice cream Sorbet, sherbet, or frozen yogurt with less than 3 g fat per ½-cup serving     Fats and Oils  Although high-fat foods are higher in calories, they can help you feel satisfied with eating less  Don't be afraid to have fats in your diet, but try to limit saturated and trans fats  You need saturated and unsaturated fats in your diet, but most Americans get too much saturated fat  Heart disease, diabetes, some cancers, and arthritis have been linked to diets high in saturated fat, particularly saturated fats from animal products      FATS & OILS   INSTEAD OF THIS: TRY THIS:   Cookies Fruit or whole-grain cookies   Shortening, butter, and margarine Olive, canola, and soybean oils   Regular mayonnaise Yogurt   Regular salad dressing Vinaigrette with olive oil and vinegar   Butter or fat to grease pans Nonstick cooking spray, olive oil, or canola oil     Beverages  It is important that you stay hydrated  However, drinks that contain sugar are not healthy  This includes fruit juices, soda, sports and energy drinks, sweetened or flavored milk, and sweet tea  Artificial sweeteners may also be bad for your health  Drink mostly water or other unsweetened drinks  Don't drink too much alcohol  Women should have no more than one drink per day  Men should have no more than two drinks per day  More information  American Academy of [employer]icians Patient Information Resource   https://familydoctor  org/dietary-fats-whats-good-and-whats-bad/ and https://familydoctor  org/nutrition-tips-for-improving-your-health/  [employer] Eating Plate   [facility]   Department of Agriculture, Choose My Plate   [employer] br

## 2020-01-02 NOTE — PROGRESS NOTES
Assessment/Plan:       Problem List Items Addressed This Visit        Cardiovascular and Mediastinum    Migraine without status migrainosus, not intractable     - Increased migraine frequency over the past couple of weeks, associated with insomnia likely secondary to increase stress  - Will increase Topamax to 50 mg BID   - Patient has upcoming OMT appointment          Relevant Medications    topiramate (TOPAMAX) 50 MG tablet       Other    Insomnia - Primary     - Worsening of insomnia likely due to increased stress/stressful life events   - Discussed given situational insomnia, addition or change in medication will likely not be helpful long-term   - Advised can continue trazodone, melatonin HS   - Sleep hygiene discussed in detail   - Advised to consider starting psychotherapy sessions         Unable to lose weight     Wt Readings from Last 3 Encounters:   01/02/20 104 kg (229 lb 6 4 oz)   10/31/19 106 kg (233 lb 12 8 oz)   10/08/19 105 kg (230 lb 12 8 oz)     - Weight stable since October  - Diet and exercise discussed in detail   - Advised to reestablish with [facility]   - Increase Topamax to 50 mg BID   - Will continue to monitor, consider endocrinology referral in future          Abdominal bloating     - Unclear etiology with benign abdominal exam, differential includes symptoms secondary to high-fiber diet, food intolerance, unlikely gastroenteritis or diverticulitis    - Advised to monitor to monitor symptoms and keep food diary, will monitor for patterns   - Advised to try probiotic   - Likely psychogenic/stress component   - Will continue to follow at subsequent visits, return/ED precautions discussed in detail                    Subjective:      Patient ID: Chapincito Ware is a 53 y o  female presenting to discuss multiple complaints as outlined below  HPI     Inability to lose weight- Per patient she is very frustrated with her inability to lose weight   She states she rarely eats anything, eating mostly eggs and lettuce only per patient, denies any fast food or snacks  She is exercising more and doesn't understand why she's not losing weight  She is taking the Topamax regularly, denies any new medications or change in medications  Abdominal pain- She has been having stomach cramping and bloating for the past 3-4 weeks, occurring every couple of days  It will usually last an entire day, won't come and go  She has been eating a high number of foods with fiber including kale, stays away from starches, although has been doing that for awhile  She denies any diarrhea or constipation  Feels similar to dysmenorrhea prior to hysterectomy  Seems to be worse with movement  She is asymptomatic currently  She is wondering if taking a probiotic would help, as she was diagnosed with a bacteria in the past      Insomnia- Falling asleep with the trazodone, but wakes up multiple times through the night (2 AM, 4 AM, etc ) Trouble sleeping especially over the last week, wakes up and can't go back to sleep  She is very stressed about the upcoming trial following her daughters murder, is tearful during our encounter  She denies any suicidal or homicidal ideations  She is living by herself and her dog now, less stressed now that she's not living with her other daughter  She has noticed more frequent migraine headaches over the past couple of weeks as well  The following portions of the patient's history were reviewed and updated as appropriate: allergies, current medications, past family history, past medical history, past social history, past surgical history and problem list     Review of Systems   Constitutional: Negative for activity change, appetite change, chills, fever and unexpected weight change  Eyes: Negative for discharge and redness  Respiratory: Negative for shortness of breath  Cardiovascular: Negative for chest pain     Gastrointestinal: Positive for abdominal distention (intermittent bloating ) and abdominal pain (intermittent )  Negative for blood in stool, constipation, diarrhea, nausea and vomiting  Genitourinary: Negative for decreased urine volume and difficulty urinating  Skin: Negative for pallor and rash  Neurological: Positive for headaches  Psychiatric/Behavioral: Positive for sleep disturbance  Negative for agitation, behavioral problems, dysphoric mood and suicidal ideas  The patient is not nervous/anxious  Objective:      /90   Pulse 78   Temp 98 6 °F (37 °C)   Resp 18   Ht 5' 7 1" (1 704 m)   Wt 104 kg (229 lb 6 4 oz)   LMP 01/05/2005   BMI 35 82 kg/m²     Wt Readings from Last 3 Encounters:   01/02/20 104 kg (229 lb 6 4 oz)   10/31/19 106 kg (233 lb 12 8 oz)   10/08/19 105 kg (230 lb 12 8 oz)          Physical Exam   Constitutional: She is oriented to person, place, and time  She appears well-developed and well-nourished  No distress  HENT:   Head: Normocephalic and atraumatic  Neck: Neck supple  Cardiovascular: Normal rate, regular rhythm and intact distal pulses  Murmur heard  2/6 systolic murmur   Pulmonary/Chest: Effort normal and breath sounds normal  No stridor  No respiratory distress  She has no wheezes  She has no rales  Abdominal: Soft  Bowel sounds are normal  She exhibits no distension  There is tenderness  There is no rebound and no guarding  RLQ tenderness to palpation, no facial grimace, no rebound or guarding    Neurological: She is alert and oriented to person, place, and time  She exhibits normal muscle tone  Skin: Skin is warm  She is not diaphoretic  Psychiatric: She has a normal mood and affect  Her behavior is normal    Vitals reviewed        Jyoti Osei, DO PGY-3   [address]

## 2020-01-03 NOTE — ASSESSMENT & PLAN NOTE
- Worsening of insomnia likely due to increased stress/stressful life events   - Discussed given situational insomnia, addition or change in medication will likely not be helpful long-term   - Advised can continue trazodone, melatonin HS   - Sleep hygiene discussed in detail   - Advised to consider starting psychotherapy sessions

## 2020-01-03 NOTE — ASSESSMENT & PLAN NOTE
Wt Readings from Last 3 Encounters:   01/02/20 104 kg (229 lb 6 4 oz)   10/31/19 106 kg (233 lb 12 8 oz)   10/08/19 105 kg (230 lb 12 8 oz)     - Weight stable since October  - Diet and exercise discussed in detail   - Advised to reestablish with [facility]   - Increase Topamax to 50 mg BID   - Will continue to monitor, consider endocrinology referral in future

## 2020-01-03 NOTE — ASSESSMENT & PLAN NOTE
- Increased migraine frequency over the past couple of weeks, associated with insomnia likely secondary to increase stress  - Will increase Topamax to 50 mg BID   - Patient has upcoming OMT appointment

## 2020-01-03 NOTE — ASSESSMENT & PLAN NOTE
- Unclear etiology with benign abdominal exam, differential includes symptoms secondary to high-fiber diet, food intolerance, unlikely gastroenteritis or diverticulitis    - Advised to monitor to monitor symptoms and keep food diary, will monitor for patterns   - Advised to try probiotic   - Likely psychogenic/stress component   - Will continue to follow at subsequent visits, return/ED precautions discussed in detail

## 2020-04-25 DIAGNOSIS — G47.00 INSOMNIA, UNSPECIFIED TYPE: ICD-10-CM

## 2020-04-27 RX ORDER — TRAZODONE HYDROCHLORIDE 150 MG/1
TABLET ORAL
Qty: 90 TABLET | Refills: 1 | Status: SHIPPED | OUTPATIENT
Start: 2020-04-27 | End: 2021-06-11 | Stop reason: SDUPTHER

## 2020-11-03 ENCOUNTER — OFFICE VISIT (OUTPATIENT)
Dept: FAMILY MEDICINE CLINIC | Facility: CLINIC | Age: 45
End: 2020-11-03

## 2020-11-03 VITALS
BODY MASS INDEX: 34.62 KG/M2 | SYSTOLIC BLOOD PRESSURE: 142 MMHG | RESPIRATION RATE: 18 BRPM | OXYGEN SATURATION: 99 % | TEMPERATURE: 97.4 F | HEIGHT: 67 IN | WEIGHT: 220.6 LBS | DIASTOLIC BLOOD PRESSURE: 102 MMHG | HEART RATE: 100 BPM

## 2020-11-03 DIAGNOSIS — R10.2 VAGINAL PAIN: Primary | ICD-10-CM

## 2020-11-03 DIAGNOSIS — G43.909 MIGRAINE WITHOUT STATUS MIGRAINOSUS, NOT INTRACTABLE, UNSPECIFIED MIGRAINE TYPE: ICD-10-CM

## 2020-11-03 DIAGNOSIS — R35.0 FREQUENCY OF URINATION: ICD-10-CM

## 2020-11-03 LAB
SL AMB  POCT GLUCOSE, UA: NEGATIVE
SL AMB LEUKOCYTE ESTERASE,UA: NEGATIVE
SL AMB POCT BILIRUBIN,UA: ABNORMAL
SL AMB POCT BLOOD,UA: ABNORMAL
SL AMB POCT CLARITY,UA: CLEAR
SL AMB POCT COLOR,UA: YELLOW
SL AMB POCT KETONES,UA: NEGATIVE
SL AMB POCT NITRITE,UA: POSITIVE
SL AMB POCT PH,UA: 6
SL AMB POCT SPECIFIC GRAVITY,UA: 1.03
SL AMB POCT URINE PROTEIN: ABNORMAL
SL AMB POCT UROBILINOGEN: 0.2

## 2020-11-03 PROCEDURE — 87086 URINE CULTURE/COLONY COUNT: CPT | Performed by: PHYSICIAN ASSISTANT

## 2020-11-03 PROCEDURE — 81002 URINALYSIS NONAUTO W/O SCOPE: CPT | Performed by: PHYSICIAN ASSISTANT

## 2020-11-03 PROCEDURE — 99213 OFFICE O/P EST LOW 20 MIN: CPT | Performed by: PHYSICIAN ASSISTANT

## 2020-11-03 RX ORDER — RIZATRIPTAN BENZOATE 10 MG/1
10 TABLET ORAL ONCE AS NEEDED
Qty: 9 TABLET | Refills: 0 | Status: SHIPPED | OUTPATIENT
Start: 2020-11-03 | End: 2020-12-22

## 2020-11-04 ENCOUNTER — TELEPHONE (OUTPATIENT)
Dept: FAMILY MEDICINE CLINIC | Facility: CLINIC | Age: 45
End: 2020-11-04

## 2020-11-04 LAB — BACTERIA UR CULT: NORMAL

## 2020-12-19 DIAGNOSIS — G43.909 MIGRAINE WITHOUT STATUS MIGRAINOSUS, NOT INTRACTABLE, UNSPECIFIED MIGRAINE TYPE: ICD-10-CM

## 2020-12-22 RX ORDER — RIZATRIPTAN BENZOATE 10 MG/1
TABLET ORAL
Qty: 9 TABLET | Refills: 0 | Status: SHIPPED | OUTPATIENT
Start: 2020-12-22 | End: 2021-01-26

## 2021-01-26 DIAGNOSIS — G43.909 MIGRAINE WITHOUT STATUS MIGRAINOSUS, NOT INTRACTABLE, UNSPECIFIED MIGRAINE TYPE: ICD-10-CM

## 2021-01-26 RX ORDER — RIZATRIPTAN BENZOATE 10 MG/1
TABLET ORAL
Qty: 9 TABLET | Refills: 0 | Status: SHIPPED | OUTPATIENT
Start: 2021-01-26 | End: 2021-06-11 | Stop reason: SDUPTHER

## 2021-05-18 ENCOUNTER — TELEPHONE (OUTPATIENT)
Dept: FAMILY MEDICINE CLINIC | Facility: CLINIC | Age: 46
End: 2021-05-18

## 2021-05-24 ENCOUNTER — TELEPHONE (OUTPATIENT)
Dept: FAMILY MEDICINE CLINIC | Facility: CLINIC | Age: 46
End: 2021-05-24

## 2021-06-11 ENCOUNTER — TELEPHONE (OUTPATIENT)
Dept: FAMILY MEDICINE CLINIC | Facility: CLINIC | Age: 46
End: 2021-06-11

## 2021-06-11 ENCOUNTER — OFFICE VISIT (OUTPATIENT)
Dept: FAMILY MEDICINE CLINIC | Facility: CLINIC | Age: 46
End: 2021-06-11

## 2021-06-11 VITALS
SYSTOLIC BLOOD PRESSURE: 140 MMHG | WEIGHT: 222.2 LBS | HEART RATE: 101 BPM | DIASTOLIC BLOOD PRESSURE: 90 MMHG | RESPIRATION RATE: 18 BRPM | BODY MASS INDEX: 34.88 KG/M2 | OXYGEN SATURATION: 95 % | HEIGHT: 67 IN | TEMPERATURE: 98.1 F

## 2021-06-11 DIAGNOSIS — Z12.12 SCREENING FOR COLORECTAL CANCER: ICD-10-CM

## 2021-06-11 DIAGNOSIS — G47.00 INSOMNIA, UNSPECIFIED TYPE: ICD-10-CM

## 2021-06-11 DIAGNOSIS — Z12.31 ENCOUNTER FOR SCREENING MAMMOGRAM FOR BREAST CANCER: ICD-10-CM

## 2021-06-11 DIAGNOSIS — Z13.6 SCREENING FOR CARDIOVASCULAR CONDITION: ICD-10-CM

## 2021-06-11 DIAGNOSIS — Z12.11 SCREENING FOR COLORECTAL CANCER: ICD-10-CM

## 2021-06-11 DIAGNOSIS — Z00.00 ENCOUNTER FOR ANNUAL WELLNESS EXAM IN MEDICARE PATIENT: Primary | ICD-10-CM

## 2021-06-11 DIAGNOSIS — F17.200 TOBACCO DEPENDENCE: ICD-10-CM

## 2021-06-11 DIAGNOSIS — G43.909 MIGRAINE WITHOUT STATUS MIGRAINOSUS, NOT INTRACTABLE, UNSPECIFIED MIGRAINE TYPE: ICD-10-CM

## 2021-06-11 DIAGNOSIS — M54.40 CHRONIC MIDLINE LOW BACK PAIN WITH SCIATICA, SCIATICA LATERALITY UNSPECIFIED: ICD-10-CM

## 2021-06-11 DIAGNOSIS — G89.29 CHRONIC MIDLINE LOW BACK PAIN WITH SCIATICA, SCIATICA LATERALITY UNSPECIFIED: ICD-10-CM

## 2021-06-11 PROCEDURE — G0438 PPPS, INITIAL VISIT: HCPCS | Performed by: FAMILY MEDICINE

## 2021-06-11 RX ORDER — VARENICLINE TARTRATE
KIT
Qty: 53 TABLET | Refills: 0 | Status: SHIPPED | OUTPATIENT
Start: 2021-06-11 | End: 2021-07-12 | Stop reason: DRUGHIGH

## 2021-06-11 RX ORDER — IBUPROFEN 600 MG/1
600 TABLET ORAL EVERY 6 HOURS PRN
Qty: 30 TABLET | Refills: 1 | Status: SHIPPED | OUTPATIENT
Start: 2021-06-11 | End: 2022-01-05 | Stop reason: SDUPTHER

## 2021-06-11 RX ORDER — RIZATRIPTAN BENZOATE 10 MG/1
10 TABLET ORAL ONCE AS NEEDED
Qty: 9 TABLET | Refills: 0 | Status: SHIPPED | OUTPATIENT
Start: 2021-06-11 | End: 2021-07-06 | Stop reason: SDUPTHER

## 2021-06-11 RX ORDER — TRAZODONE HYDROCHLORIDE 150 MG/1
150 TABLET ORAL
Qty: 90 TABLET | Refills: 1 | Status: SHIPPED | OUTPATIENT
Start: 2021-06-11 | End: 2021-12-01 | Stop reason: SDUPTHER

## 2021-06-11 RX ORDER — TOPIRAMATE 50 MG/1
50 TABLET, FILM COATED ORAL 2 TIMES DAILY
Qty: 60 TABLET | Refills: 2 | Status: SHIPPED | OUTPATIENT
Start: 2021-06-11 | End: 2021-09-01 | Stop reason: SDUPTHER

## 2021-06-11 NOTE — PATIENT INSTRUCTIONS
Medicare Preventive Visit Patient Instructions  Thank you for completing your Welcome to Medicare Visit or Medicare Annual Wellness Visit today  Your next wellness visit will be due in one year (6/12/2022)  The screening/preventive services that you may require over the next 5-10 years are detailed below  Some tests may not apply to you based off risk factors and/or age  Screening tests ordered at today's visit but not completed yet may show as past due  Also, please note that scanned in results may not display below  Preventive Screenings:  Service Recommendations Previous Testing/Comments   Colorectal Cancer Screening  * Colonoscopy    * Fecal Occult Blood Test (FOBT)/Fecal Immunochemical Test (FIT)  * Fecal DNA/Cologuard Test  * Flexible Sigmoidoscopy Age: 54-65 years old   Colonoscopy: every 10 years (may be performed more frequently if at higher risk)  OR  FOBT/FIT: every 1 year  OR  Cologuard: every 3 years  OR  Sigmoidoscopy: every 5 years  Screening may be recommended earlier than age 48 if at higher risk for colorectal cancer  Also, an individualized decision between you and your healthcare provider will decide whether screening between the ages of 74-80 would be appropriate  Colonoscopy: Not on file  FOBT/FIT: Not on file  Cologuard: Not on file  Sigmoidoscopy: Not on file          Breast Cancer Screening Age: 36 years old  Frequency: every 1-2 years  Not required if history of left and right mastectomy Mammogram: 06/30/2014        Cervical Cancer Screening Between the ages of 21-29, pap smear recommended once every 3 years  Between the ages of 33-67, can perform pap smear with HPV co-testing every 5 years     Recommendations may differ for women with a history of total hysterectomy, cervical cancer, or abnormal pap smears in past  Pap Smear: Not on file        Hepatitis C Screening Once for adults born between [facility]  More frequently in patients at high risk for Hepatitis C Hep C Antibody: Not on file        Diabetes Screening 1-2 times per year if you're at risk for diabetes or have pre-diabetes Fasting glucose: 79 mg/dL   A1C: No results in last 5 years        Cholesterol Screening Once every 5 years if you don't have a lipid disorder  May order more often based on risk factors  Lipid panel: 04/24/2019          Other Preventive Screenings Covered by Medicare:  1  Abdominal Aortic Aneurysm (AAA) Screening: covered once if your at risk  You're considered to be at risk if you have a family history of AAA  2  Lung Cancer Screening: covers low dose CT scan once per year if you meet all of the following conditions: (1) Age 50-69; (2) No signs or symptoms of lung cancer; (3) Current smoker or have quit smoking within the last 15 years; (4) You have a tobacco smoking history of at least 30 pack years (packs per day multiplied by number of years you smoked); (5) You get a written order from a healthcare provider  3  Glaucoma Screening: covered annually if you're considered high risk: (1) You have diabetes OR (2) Family history of glaucoma OR (3)  aged 48 and older OR (3)  American aged 72 and older  3  Osteoporosis Screening: covered every 2 years if you meet one of the following conditions: (1) You're estrogen deficient and at risk for osteoporosis based off medical history and other findings; (2) Have a vertebral abnormality; (3) On glucocorticoid therapy for more than 3 months; (4) Have primary hyperparathyroidism; (5) On osteoporosis medications and need to assess response to drug therapy  · Last bone density test (DXA Scan): Not on file  5  HIV Screening: covered annually if you're between the age of 12-76  Also covered annually if you are younger than 13 and older than 72 with risk factors for HIV infection  For pregnant patients, it is covered up to 3 times per pregnancy      Immunizations:  Immunization Recommendations   Influenza Vaccine Annual influenza vaccination during flu season is recommended for all persons aged >= 6 months who do not have contraindications   Pneumococcal Vaccine (Prevnar and Pneumovax)  * Prevnar = PCV13  * Pneumovax = PPSV23   Adults 25-60 years old: 1-3 doses may be recommended based on certain risk factors  Adults 72 years old: Prevnar (PCV13) vaccine recommended followed by Pneumovax (PPSV23) vaccine  If already received PPSV23 since turning 65, then PCV13 recommended at least one year after PPSV23 dose  Hepatitis B Vaccine 3 dose series if at intermediate or high risk (ex: diabetes, end stage renal disease, liver disease)   Tetanus (Td) Vaccine - COST NOT COVERED BY MEDICARE PART B Following completion of primary series, a booster dose should be given every 10 years to maintain immunity against tetanus  Td may also be given as tetanus wound prophylaxis  Tdap Vaccine - COST NOT COVERED BY MEDICARE PART B Recommended at least once for all adults  For pregnant patients, recommended with each pregnancy  Shingles Vaccine (Shingrix) - COST NOT COVERED BY MEDICARE PART B  2 shot series recommended in those aged 48 and above     Health Maintenance Due:      Topic Date Due    HIV Screening  Never done    Cervical Cancer Screening  Never done    MAMMOGRAM  06/30/2015     Immunizations Due:      Topic Date Due    Pneumococcal Vaccine: Pediatrics (0 to 5 Years) and At-Risk Patients (6 to 59 Years) (1 of 1 - PPSV23) Never done    COVID-19 Vaccine (1) Never done    DTaP,Tdap,and Td Vaccines (1 - Tdap) Never done    Influenza Vaccine (Season Ended) 09/01/2021     Advance Directives   What are advance directives? Advance directives are legal documents that state your wishes and plans for medical care  These plans are made ahead of time in case you lose your ability to make decisions for yourself  Advance directives can apply to any medical decision, such as the treatments you want, and if you want to donate organs     What are the types of advance directives? There are many types of advance directives, and each state has rules about how to use them  You may choose a combination of any of the following:  · Living will: This is a written record of the treatment you want  You can also choose which treatments you do not want, which to limit, and which to stop at a certain time  This includes surgery, medicine, IV fluid, and tube feedings  · Durable power of  for healthcare [facility]): This is a written record that states who you want to make healthcare choices for you when you are unable to make them for yourself  This person, called a proxy, is usually a family member or a friend  You may choose more than 1 proxy  · Do not resuscitate (DNR) order:  A DNR order is used in case your heart stops beating or you stop breathing  It is a request not to have certain forms of treatment, such as CPR  A DNR order may be included in other types of advance directives  · Medical directive: This covers the care that you want if you are in a coma, near death, or unable to make decisions for yourself  You can list the treatments you want for each condition  Treatment may include pain medicine, surgery, blood transfusions, dialysis, IV or tube feedings, and a ventilator (breathing machine)  · Values history: This document has questions about your views, beliefs, and how you feel and think about life  This information can help others choose the care that you would choose  Why are advance directives important? An advance directive helps you control your care  Although spoken wishes may be used, it is better to have your wishes written down  Spoken wishes can be misunderstood, or not followed  Treatments may be given even if you do not want them  An advance directive may make it easier for your family to make difficult choices about your care     Cigarette Smoking and Your Health   Risks to your health if you smoke:  Nicotine and other chemicals found in tobacco damage every cell in your body  Even if you are a light smoker, you have an increased risk for cancer, heart disease, and lung disease  If you are pregnant or have diabetes, smoking increases your risk for complications  Benefits to your health if you stop smoking:   · You decrease respiratory symptoms such as coughing, wheezing, and shortness of breath  · You reduce your risk for cancers of the lung, mouth, throat, kidney, bladder, pancreas, stomach, and cervix  If you already have cancer, you increase the benefits of chemotherapy  You also reduce your risk for cancer returning or a second cancer from developing  · You reduce your risk for heart disease, blood clots, heart attack, and stroke  · You reduce your risk for lung infections, and diseases such as pneumonia, asthma, chronic bronchitis, and emphysema  · Your circulation improves  More oxygen can be delivered to your body  If you have diabetes, you lower your risk for complications, such as kidney, artery, and eye diseases  You also lower your risk for nerve damage  Nerve damage can lead to amputations, poor vision, and blindness  · You improve your body's ability to heal and to fight infections  For more information and support to stop smoking:   · [employer]  Phone: 3- 627 - 547-9066  Web Address: www [employer]  Weight Management   Why it is important to manage your weight:  Being overweight increases your risk of health conditions such as heart disease, high blood pressure, type 2 diabetes, and certain types of cancer  It can also increase your risk for osteoarthritis, sleep apnea, and other respiratory problems  Aim for a slow, steady weight loss  Even a small amount of weight loss can lower your risk of health problems  How to lose weight safely:  A safe and healthy way to lose weight is to eat fewer calories and get regular exercise   You can lose up about 1 pound a week by decreasing the number of calories you eat by 500 calories each day  Healthy meal plan for weight management:  A healthy meal plan includes a variety of foods, contains fewer calories, and helps you stay healthy  A healthy meal plan includes the following:  · Eat whole-grain foods more often  A healthy meal plan should contain fiber  Fiber is the part of grains, fruits, and vegetables that is not broken down by your body  Whole-grain foods are healthy and provide extra fiber in your diet  Some examples of whole-grain foods are whole-wheat breads and pastas, oatmeal, brown rice, and bulgur  · Eat a variety of vegetables every day  Include dark, leafy greens such as spinach, kale, doc greens, and mustard greens  Eat yellow and orange vegetables such as carrots, sweet potatoes, and winter squash  · Eat a variety of fruits every day  Choose fresh or canned fruit (canned in its own juice or light syrup) instead of juice  Fruit juice has very little or no fiber  · Eat low-fat dairy foods  Drink fat-free (skim) milk or 1% milk  Eat fat-free yogurt and low-fat cottage cheese  Try low-fat cheeses such as mozzarella and other reduced-fat cheeses  · Choose meat and other protein foods that are low in fat  Choose beans or other legumes such as split peas or lentils  Choose fish, skinless poultry (chicken or turkey), or lean cuts of red meat (beef or pork)  Before you cook meat or poultry, cut off any visible fat  · Use less fat and oil  Try baking foods instead of frying them  Add less fat, such as margarine, sour cream, regular salad dressing and mayonnaise to foods  Eat fewer high-fat foods  Some examples of high-fat foods include french fries, doughnuts, ice cream, and cakes  · Eat fewer sweets  Limit foods and drinks that are high in sugar  This includes candy, cookies, regular soda, and sweetened drinks  Exercise:  Exercise at least 30 minutes per day on most days of the week  Some examples of exercise include walking, biking, dancing, and swimming   You can also fit in more physical activity by taking the stairs instead of the elevator or parking farther away from stores  Ask your healthcare provider about the best exercise plan for you  © Copyright [employer] 2018 Information is for End User's use only and may not be sold, redistributed or otherwise used for commercial purposes  All illustrations and images included in CareNotes® are the copyrighted property of [employer]  or [facility] Preventive Visit Patient Instructions  Thank you for completing your Welcome to Medicare Visit or Medicare Annual Wellness Visit today  Your next wellness visit will be due in one year (6/12/2022)  The screening/preventive services that you may require over the next 5-10 years are detailed below  Some tests may not apply to you based off risk factors and/or age  Screening tests ordered at today's visit but not completed yet may show as past due  Also, please note that scanned in results may not display below  Preventive Screenings:  Service Recommendations Previous Testing/Comments   Colorectal Cancer Screening  * Colonoscopy    * Fecal Occult Blood Test (FOBT)/Fecal Immunochemical Test (FIT)  * Fecal DNA/Cologuard Test  * Flexible Sigmoidoscopy Age: 54-65 years old   Colonoscopy: every 10 years (may be performed more frequently if at higher risk)  OR  FOBT/FIT: every 1 year  OR  Cologuard: every 3 years  OR  Sigmoidoscopy: every 5 years  Screening may be recommended earlier than age 48 if at higher risk for colorectal cancer  Also, an individualized decision between you and your healthcare provider will decide whether screening between the ages of 74-80 would be appropriate   Colonoscopy: Not on file  FOBT/FIT: Not on file  Cologuard: Not on file  Sigmoidoscopy: Not on file          Breast Cancer Screening Age: 36 years old  Frequency: every 1-2 years  Not required if history of left and right mastectomy Mammogram: 06/30/2014        Cervical Cancer Screening Between the ages of 21-29, pap smear recommended once every 3 years  Between the ages of 33-67, can perform pap smear with HPV co-testing every 5 years  Recommendations may differ for women with a history of total hysterectomy, cervical cancer, or abnormal pap smears in past  Pap Smear: Not on file        Hepatitis C Screening Once for adults born between 1945 and 1965  More frequently in patients at high risk for Hepatitis C Hep C Antibody: Not on file        Diabetes Screening 1-2 times per year if you're at risk for diabetes or have pre-diabetes Fasting glucose: 79 mg/dL   A1C: No results in last 5 years        Cholesterol Screening Once every 5 years if you don't have a lipid disorder  May order more often based on risk factors  Lipid panel: 04/24/2019    Screening Current     Other Preventive Screenings Covered by Medicare:  6  Abdominal Aortic Aneurysm (AAA) Screening: covered once if your at risk  You're considered to be at risk if you have a family history of AAA  7  Lung Cancer Screening: covers low dose CT scan once per year if you meet all of the following conditions: (1) Age 50-69; (2) No signs or symptoms of lung cancer; (3) Current smoker or have quit smoking within the last 15 years; (4) You have a tobacco smoking history of at least 30 pack years (packs per day multiplied by number of years you smoked); (5) You get a written order from a healthcare provider  8  Glaucoma Screening: covered annually if you're considered high risk: (1) You have diabetes OR (2) Family history of glaucoma OR (3)  aged 48 and older OR (3)  American aged 72 and older  5   Osteoporosis Screening: covered every 2 years if you meet one of the following conditions: (1) You're estrogen deficient and at risk for osteoporosis based off medical history and other findings; (2) Have a vertebral abnormality; (3) On glucocorticoid therapy for more than 3 months; (4) Have primary hyperparathyroidism; (5) On osteoporosis medications and need to assess response to drug therapy  · Last bone density test (DXA Scan): Not on file  10  HIV Screening: covered annually if you're between the age of 12-76  Also covered annually if you are younger than 13 and older than 72 with risk factors for HIV infection  For pregnant patients, it is covered up to 3 times per pregnancy  Immunizations:  Immunization Recommendations   Influenza Vaccine Annual influenza vaccination during flu season is recommended for all persons aged >= 6 months who do not have contraindications   Pneumococcal Vaccine (Prevnar and Pneumovax)  * Prevnar = PCV13  * Pneumovax = PPSV23   Adults 25-60 years old: 1-3 doses may be recommended based on certain risk factors  Adults 72 years old: Prevnar (PCV13) vaccine recommended followed by Pneumovax (PPSV23) vaccine  If already received PPSV23 since turning 65, then PCV13 recommended at least one year after PPSV23 dose  Hepatitis B Vaccine 3 dose series if at intermediate or high risk (ex: diabetes, end stage renal disease, liver disease)   Tetanus (Td) Vaccine - COST NOT COVERED BY MEDICARE PART B Following completion of primary series, a booster dose should be given every 10 years to maintain immunity against tetanus  Td may also be given as tetanus wound prophylaxis  Tdap Vaccine - COST NOT COVERED BY MEDICARE PART B Recommended at least once for all adults  For pregnant patients, recommended with each pregnancy     Shingles Vaccine (Shingrix) - COST NOT COVERED BY MEDICARE PART B  2 shot series recommended in those aged 48 and above     Health Maintenance Due:      Topic Date Due    HIV Screening  Never done    Cervical Cancer Screening  Never done    MAMMOGRAM  06/30/2015     Immunizations Due:      Topic Date Due    Pneumococcal Vaccine: Pediatrics (0 to 5 Years) and At-Risk Patients (6 to 59 Years) (1 of 1 - PPSV23) Never done    COVID-19 Vaccine (1) Never done   Duyen Aleja DTaP,Tdap,and Td Vaccines (1 - Tdap) Never done    Influenza Vaccine (Season Ended) 09/01/2021     Advance Directives   What are advance directives? Advance directives are legal documents that state your wishes and plans for medical care  These plans are made ahead of time in case you lose your ability to make decisions for yourself  Advance directives can apply to any medical decision, such as the treatments you want, and if you want to donate organs  What are the types of advance directives? There are many types of advance directives, and each state has rules about how to use them  You may choose a combination of any of the following:  · Living will: This is a written record of the treatment you want  You can also choose which treatments you do not want, which to limit, and which to stop at a certain time  This includes surgery, medicine, IV fluid, and tube feedings  · Durable power of  for healthcare [facility]): This is a written record that states who you want to make healthcare choices for you when you are unable to make them for yourself  This person, called a proxy, is usually a family member or a friend  You may choose more than 1 proxy  · Do not resuscitate (DNR) order:  A DNR order is used in case your heart stops beating or you stop breathing  It is a request not to have certain forms of treatment, such as CPR  A DNR order may be included in other types of advance directives  · Medical directive: This covers the care that you want if you are in a coma, near death, or unable to make decisions for yourself  You can list the treatments you want for each condition  Treatment may include pain medicine, surgery, blood transfusions, dialysis, IV or tube feedings, and a ventilator (breathing machine)  · Values history: This document has questions about your views, beliefs, and how you feel and think about life  This information can help others choose the care that you would choose    Why are advance directives important? An advance directive helps you control your care  Although spoken wishes may be used, it is better to have your wishes written down  Spoken wishes can be misunderstood, or not followed  Treatments may be given even if you do not want them  An advance directive may make it easier for your family to make difficult choices about your care  Urinary Incontinence   Urinary incontinence (UI)  is when you lose control of your bladder  UI develops because your bladder cannot store or empty urine properly  The 3 most common types of UI are stress incontinence, urge incontinence, or both  Medicines:   · May be given to help strengthen your bladder control  Report any side effects of medication to your healthcare provider  Do pelvic muscle exercises often:  Your pelvic muscles help you stop urinating  Squeeze these muscles tight for 5 seconds, then relax for 5 seconds  Gradually work up to squeezing for 10 seconds  Do 3 sets of 15 repetitions a day, or as directed  This will help strengthen your pelvic muscles and improve bladder control  Train your bladder:  Go to the bathroom at set times, such as every 2 hours, even if you do not feel the urge to go  You can also try to hold your urine when you feel the urge to go  For example, hold your urine for 5 minutes when you feel the urge to go  As that becomes easier, hold your urine for 10 minutes  Self-care:   · Keep a UI record  Write down how often you leak urine and how much you leak  Make a note of what you were doing when you leaked urine  · Drink liquids as directed  You may need to limit the amount of liquid you drink to help control your urine leakage  Do not drink any liquid right before you go to bed  Limit or do not have drinks that contain caffeine or alcohol  · Prevent constipation  Eat a variety of high-fiber foods  Good examples are high-fiber cereals, beans, vegetables, and whole-grain breads   Walking is the best way to trigger your intestines to have a bowel movement  · Exercise regularly and maintain a healthy weight  Weight loss and exercise will decrease pressure on your bladder and help you control your leakage  · Use a catheter as directed  to help empty your bladder  A catheter is a tiny, plastic tube that is put into your bladder to drain your urine  · Go to behavior therapy as directed  Behavior therapy may be used to help you learn to control your urge to urinate  Cigarette Smoking and Your Health   Risks to your health if you smoke:  Nicotine and other chemicals found in tobacco damage every cell in your body  Even if you are a light smoker, you have an increased risk for cancer, heart disease, and lung disease  If you are pregnant or have diabetes, smoking increases your risk for complications  Benefits to your health if you stop smoking:   · You decrease respiratory symptoms such as coughing, wheezing, and shortness of breath  · You reduce your risk for cancers of the lung, mouth, throat, kidney, bladder, pancreas, stomach, and cervix  If you already have cancer, you increase the benefits of chemotherapy  You also reduce your risk for cancer returning or a second cancer from developing  · You reduce your risk for heart disease, blood clots, heart attack, and stroke  · You reduce your risk for lung infections, and diseases such as pneumonia, asthma, chronic bronchitis, and emphysema  · Your circulation improves  More oxygen can be delivered to your body  If you have diabetes, you lower your risk for complications, such as kidney, artery, and eye diseases  You also lower your risk for nerve damage  Nerve damage can lead to amputations, poor vision, and blindness  · You improve your body's ability to heal and to fight infections  For more information and support to stop smoking:   · [employer]  Phone: 0- 944 - [phone]  Web Address: www [employer]  Weight Management   Why it is important to manage your weight:  Being overweight increases your risk of health conditions such as heart disease, high blood pressure, type 2 diabetes, and certain types of cancer  It can also increase your risk for osteoarthritis, sleep apnea, and other respiratory problems  Aim for a slow, steady weight loss  Even a small amount of weight loss can lower your risk of health problems  How to lose weight safely:  A safe and healthy way to lose weight is to eat fewer calories and get regular exercise  You can lose up about 1 pound a week by decreasing the number of calories you eat by 500 calories each day  Healthy meal plan for weight management:  A healthy meal plan includes a variety of foods, contains fewer calories, and helps you stay healthy  A healthy meal plan includes the following:  · Eat whole-grain foods more often  A healthy meal plan should contain fiber  Fiber is the part of grains, fruits, and vegetables that is not broken down by your body  Whole-grain foods are healthy and provide extra fiber in your diet  Some examples of whole-grain foods are whole-wheat breads and pastas, oatmeal, brown rice, and bulgur  · Eat a variety of vegetables every day  Include dark, leafy greens such as spinach, kale, doc greens, and mustard greens  Eat yellow and orange vegetables such as carrots, sweet potatoes, and winter squash  · Eat a variety of fruits every day  Choose fresh or canned fruit (canned in its own juice or light syrup) instead of juice  Fruit juice has very little or no fiber  · Eat low-fat dairy foods  Drink fat-free (skim) milk or 1% milk  Eat fat-free yogurt and low-fat cottage cheese  Try low-fat cheeses such as mozzarella and other reduced-fat cheeses  · Choose meat and other protein foods that are low in fat  Choose beans or other legumes such as split peas or lentils  Choose fish, skinless poultry (chicken or turkey), or lean cuts of red meat (beef or pork)   Before you cook meat or poultry, cut off any visible fat  · Use less fat and oil  Try baking foods instead of frying them  Add less fat, such as margarine, sour cream, regular salad dressing and mayonnaise to foods  Eat fewer high-fat foods  Some examples of high-fat foods include french fries, doughnuts, ice cream, and cakes  · Eat fewer sweets  Limit foods and drinks that are high in sugar  This includes candy, cookies, regular soda, and sweetened drinks  Exercise:  Exercise at least 30 minutes per day on most days of the week  Some examples of exercise include walking, biking, dancing, and swimming  You can also fit in more physical activity by taking the stairs instead of the elevator or parking farther away from stores  Ask your healthcare provider about the best exercise plan for you  © Copyright [employer] 2018 Information is for End User's use only and may not be sold, redistributed or otherwise used for commercial purposes   All illustrations and images included in CareNotes® are the copyrighted property of [employer]  or [address] [place] [address]

## 2021-06-11 NOTE — PROGRESS NOTES
Assessment and Plan:     Problem List Items Addressed This Visit        Other    Encounter for annual wellness exam in Medicare patient - Primary     - H/o tobacco dependence, AUB, bipolar disorder  - Screening for cardiovascular disease: yearly BMP and Lipid panel ordered today   - Screening for colorectal cancer: Referral to GI for colonoscopy, unsure of family history  - Screening for cervical cancer: H/o hysterectomy in early 2000's  - Screening for breast cancer: Mammogram ordered today, encouraged pt to schedule apt; no fam h/o breast cancer  - Tobacco Cessation Counseling: Tobacco cessation counseling and education was provided  The patient is sincerely urged to quit consumption of tobacco  She is ready to quit tobacco  The numerous health risks of tobacco consumption were discussed  Prescribed the following medications: varenicline (Chantix)  She has been on Chantix previously  Currently 1 pack per day smoker with 40+ pack year smoking history  - Follow-up in 6 weeks for tobacco cessation  - Counseled the patient on healthy lifestyle habits           Tobacco dependence    Relevant Medications    varenicline (Chantix Starting Month Pak) 0 5 MG X 11 & 1 MG X 42 tablet      Other Visit Diagnoses     Screening for cardiovascular condition        Relevant Orders    Lipid panel    Comprehensive metabolic panel    Encounter for screening mammogram for breast cancer        Relevant Orders    Mammo screening bilateral w 3d & cad    Screening for colorectal cancer        Relevant Orders    Ambulatory referral to Gastroenterology        BMI Counseling: Body mass index is 34 7 kg/m²  The BMI is above normal  Nutrition recommendations include encouraging healthy choices of fruits and vegetables and moderation in carbohydrate intake  Exercise recommendations include moderate physical activity 150 minutes/week           Preventive health issues were discussed with patient, and age appropriate screening tests were ordered as noted in patient's After Visit Summary  Personalized health advice and appropriate referrals for health education or preventive services given if needed, as noted in patient's After Visit Summary  History of Present Illness:     Patient presents for Medicare Annual Wellness visit    Patient Care Team:  Steph Colón MD as PCP - General (Family Medicine)  Atif Tipton as PCP - [address] [place], [place] [address] [facility] ([facility])  Shantel Holcomb MD     Problem List:     Patient Active Problem List   Diagnosis    Tendinitis, de Quervain's    Fibromyalgia    Pain in left wrist    Carpal tunnel syndrome of left wrist    Insomnia    Obesity (BMI 30 0-34  9)    Chronic low back pain    Easy bruising    Systolic murmur    Class 2 obesity with body mass index (BMI) of 36 0 to 36 9 in adult    Migraine without status migrainosus, not intractable    Encounter for annual wellness exam in Medicare patient    Chronic pain of both ankles    Bipolar disorder (HCC)    Lumbar radiculopathy    Plantar fasciitis, bilateral    Elevated blood pressure reading    Unable to lose weight    Abdominal bloating    Vaginal pain    Frequency of urination    Tobacco dependence      Past Medical and Surgical History:     Past Medical History:   Diagnosis Date    Abnormal uterine bleeding     Back pain     Back pain with sciatica     Cancer ([address] )     cervical    Chronic pain     Chronic pain disorder     back & neck    Class 2 obesity with body mass index (BMI) of 36 0 to 36 9 in adult 6/4/2019    Endometriosis     Fibromyalgia, primary     Insomnia     Migraine      Past Surgical History:   Procedure Laterality Date    COLONOSCOPY      HYSTERECTOMY  2004    AUB    LAPAROSCOPY      adhesions    WI INCIS TENDON SHEATH,RADIAL STYLOID Left 5/16/2019    Procedure: RELEASE DE QUERVAIN'S LEFT WRIST;  Surgeon: Aaron Middleton MD;  Location:  MAIN OR;  Service: Orthopedics    WI REVISE MEDIAN N/CARPAL TUNNEL SURG Left 5/16/2019    Procedure: RELEASE LEFT CARPAL TUNNEL;  Surgeon: Anurag Lindsey MD;  Location: [address] [place] [address] MAIN OR;  Service: Orthopedics      Family History:     Family History   Problem Relation Age of Onset    Bipolar disorder Mother         NOS    Heart disease Mother     Liver disease Mother     Kidney disease Mother     Diabetes type II Mother     Other Father         Abandonment     No Known Problems Son     Bipolar disorder Daughter         NOS    Diabetes type II Maternal Grandmother     Leukemia Maternal Grandfather     Lung cancer Maternal Aunt     Thyroid disease Maternal Aunt       Social History:     E-Cigarette/Vaping    E-Cigarette Use Never User      E-Cigarette/Vaping Substances    Nicotine No     THC No     CBD No     Flavoring No     Other No     Unknown No      Social History     Socioeconomic History    Marital status: /Civil Union     Spouse name: None    Number of children: None    Years of education: None    Highest education level: None   Occupational History    None   Social Needs    Financial resource strain: None    Food insecurity     Worry: None     Inability: None    Transportation needs     Medical: None     Non-medical: None   Tobacco Use    Smoking status: Current Every Day Smoker     Packs/day: 0 50     Types: Cigarettes    Smokeless tobacco: Never Used   Substance and Sexual Activity    Alcohol use: Not Currently     Comment: socially     Drug use: No    Sexual activity: Not Currently     Partners: Male   Lifestyle    Physical activity     Days per week: None     Minutes per session: None    Stress: None   Relationships    Social connections     Talks on phone: None     Gets together: None     Attends L[employer] service: None     Active member of club or organization: None     Attends meetings of clubs or organizations: None     Relationship status: None    Intimate partner violence     Fear of current or ex partner: None Emotionally abused: None     Physically abused: None     Forced sexual activity: None   Other Topics Concern    None   Social History Narrative    Alcohol use (history) - As per [employer]       Medications and Allergies:     Current Outpatient Medications   Medication Sig Dispense Refill    ibuprofen (MOTRIN) 600 mg tablet Take 1 tablet (600 mg total) by mouth every 6 (six) hours as needed for mild pain 30 tablet 1    rizatriptan (MAXALT) 10 MG tablet TAKE 1 TABLET BY MOUTH ONCE AS NEEDED FOR MIGRAINE FOR UP TO 1 DOSE  MAY REPEAT IN 2 HOURS IF NEEDED 9 tablet 0    tiZANidine (ZANAFLEX) 4 mg tablet Take 1 tablet (4 mg total) by mouth every 8 (eight) hours as needed for muscle spasms 90 tablet 1    topiramate (TOPAMAX) 50 MG tablet Take 1 tablet (50 mg total) by mouth 2 (two) times a day 60 tablet 2    traZODone (DESYREL) 150 mg tablet TAKE 1 TABLET(150 MG) BY MOUTH DAILY AT BEDTIME 90 tablet 1    varenicline (Chantix Starting Month Pak) 0 5 MG X 11 & 1 MG X 42 tablet Take 0 5mg tab by mouth daily for 3 days, then increase to 0 5mg tab twice daily for 3 days, then increase to 1mg twice daily 53 tablet 0     No current facility-administered medications for this visit        Allergies   Allergen Reactions    Penicillins Shortness Of Breath and Hives      Immunizations:     Immunization History   Administered Date(s) Administered    SARS-CoV-2 / COVID-19 mRNA IM (Moderna) 04/24/2021, 05/21/2021      Health Maintenance:         Topic Date Due    HIV Screening  Never done    Cervical Cancer Screening  Never done    MAMMOGRAM  06/30/2015         Topic Date Due    Pneumococcal Vaccine: Pediatrics (0 to 5 Years) and At-Risk Patients (6 to 59 Years) (1 of 1 - PPSV23) Never done    COVID-19 Vaccine (1) Never done    DTaP,Tdap,and Td Vaccines (1 - Tdap) Never done    Influenza Vaccine (Season Ended) 09/01/2021      Medicare Health Risk Assessment:     /90 (BP Location: Left arm, Patient Position: Sitting, Cuff Size: Standard)   Pulse 101   Temp 98 1 °F (36 7 °C) (Temporal)   Resp 18   Ht 5' 7 1" (1 704 m)   Wt 101 kg (222 lb 3 2 oz)   LMP 01/05/2005   SpO2 95%   BMI 34 70 kg/m²      Jeremie Chu is here for her Initial Wellness visit  Health Risk Assessment:   Patient rates overall health as fair  Patient feels that their physical health rating is much worse  Patient is satisfied with their life  Eyesight was rated as much worse  Hearing was rated as slightly worse  Patient feels that their emotional and mental health rating is same  Patients states they are never, rarely angry  Patient states they are often unusually tired/fatigued  Pain experienced in the last 7 days has been a lot  Patient's pain rating has been 8/10  Patient states that she has experienced weight loss or gain in last 6 months  Fall Risk Screening: In the past year, patient has experienced: history of falling in past year    Number of falls: 2 or more  Injured during fall?: No    Feels unsteady when standing or walking?: Yes    Worried about falling?: Yes      Urinary Incontinence Screening:   Patient has leaked urine accidently in the last six months  Home Safety:  Patient has trouble with stairs inside or outside of their home  Patient has working smoke alarms and has working carbon monoxide detector  Home safety hazards include: none  Nutrition:   Current diet is Low Carb and Regular  Medications:   Patient is not currently taking any over-the-counter supplements  Patient is able to manage medications  Activities of Daily Living (ADLs)/Instrumental Activities of Daily Living (IADLs):   Walk and transfer into and out of bed and chair?: Yes  Dress and groom yourself?: Yes    Bathe or shower yourself?: Yes    Feed yourself?  Yes  Do your laundry/housekeeping?: Yes  Manage your money, pay your bills and track your expenses?: Yes  Make your own meals?: Yes    Do your own shopping?: Yes    Previous Hospitalizations:   Any hospitalizations or ED visits within the last 12 months?: No      Advance Care Planning:   Living will: No    Advanced directive: No    Five wishes given: Yes      PREVENTIVE SCREENINGS      Cardiovascular Screening:    General: Risks and Benefits Discussed    Due for: Lipid Panel      Diabetes Screening:     General: Risks and Benefits Discussed      Colorectal Cancer Screening:     General: Risks and Benefits Discussed    Due for: Colonoscopy - Low Risk      Breast Cancer Screening:     General: Risks and Benefits Discussed    Due for: Mammogram        Cervical Cancer Screening:    General: Screening Not Indicated      Lung Cancer Screening:     General: Screening Not Indicated    Screening, Brief Intervention, and Referral to Treatment (SBIRT)    Screening  Typical number of drinks in a day: 0  Typical number of drinks in a week: 0  Interpretation: Low risk drinking behavior      Single Item Drug Screening:  How often have you used an illegal drug (including marijuana) or a prescription medication for non-medical reasons in the past year? never    Single Item Drug Screen Score: 0  Interpretation: Negative screen for possible drug use disorder      Steph Colón MD

## 2021-06-11 NOTE — ASSESSMENT & PLAN NOTE
- H/o tobacco dependence, AUB, bipolar disorder  - Screening for cardiovascular disease: yearly BMP and Lipid panel ordered today   - Screening for colorectal cancer: Referral to GI for colonoscopy, unsure of family history  - Screening for cervical cancer: H/o hysterectomy in early 2000's  - Screening for breast cancer: Mammogram ordered today, encouraged pt to schedule apt; no fam h/o breast cancer  - Tobacco Cessation Counseling: Tobacco cessation counseling and education was provided  The patient is sincerely urged to quit consumption of tobacco  She is ready to quit tobacco  The numerous health risks of tobacco consumption were discussed  Prescribed the following medications: varenicline (Chantix)  She has been on Chantix previously  Currently 1 pack per day smoker with 40+ pack year smoking history    - Follow-up in 6 weeks for tobacco cessation  - Counseled the patient on healthy lifestyle habits

## 2021-06-17 ENCOUNTER — APPOINTMENT (OUTPATIENT)
Dept: LAB | Facility: CLINIC | Age: 46
End: 2021-06-17
Payer: MEDICARE

## 2021-06-17 DIAGNOSIS — Z13.6 SCREENING FOR CARDIOVASCULAR CONDITION: ICD-10-CM

## 2021-06-17 LAB
ALBUMIN SERPL BCP-MCNC: 3.6 G/DL (ref 3.5–5)
ALP SERPL-CCNC: 78 U/L (ref 46–116)
ALT SERPL W P-5'-P-CCNC: 20 U/L (ref 12–78)
ANION GAP SERPL CALCULATED.3IONS-SCNC: 6 MMOL/L (ref 4–13)
AST SERPL W P-5'-P-CCNC: 9 U/L (ref 5–45)
BILIRUB SERPL-MCNC: 0.42 MG/DL (ref 0.2–1)
BUN SERPL-MCNC: 15 MG/DL (ref 5–25)
CALCIUM SERPL-MCNC: 9.1 MG/DL (ref 8.3–10.1)
CHLORIDE SERPL-SCNC: 115 MMOL/L (ref 100–108)
CHOLEST SERPL-MCNC: 249 MG/DL (ref 50–200)
CO2 SERPL-SCNC: 22 MMOL/L (ref 21–32)
CREAT SERPL-MCNC: 0.84 MG/DL (ref 0.6–1.3)
GFR SERPL CREATININE-BSD FRML MDRD: 83 ML/MIN/1.73SQ M
GLUCOSE P FAST SERPL-MCNC: 88 MG/DL (ref 65–99)
HDLC SERPL-MCNC: 36 MG/DL
LDLC SERPL CALC-MCNC: 171 MG/DL (ref 0–100)
NONHDLC SERPL-MCNC: 213 MG/DL
POTASSIUM SERPL-SCNC: 3.9 MMOL/L (ref 3.5–5.3)
PROT SERPL-MCNC: 7.7 G/DL (ref 6.4–8.2)
SODIUM SERPL-SCNC: 143 MMOL/L (ref 136–145)
TRIGL SERPL-MCNC: 209 MG/DL

## 2021-06-17 PROCEDURE — 36415 COLL VENOUS BLD VENIPUNCTURE: CPT

## 2021-06-17 PROCEDURE — 80061 LIPID PANEL: CPT

## 2021-06-17 PROCEDURE — 80053 COMPREHEN METABOLIC PANEL: CPT

## 2021-06-23 ENCOUNTER — TELEPHONE (OUTPATIENT)
Dept: FAMILY MEDICINE CLINIC | Facility: CLINIC | Age: 46
End: 2021-06-23

## 2021-06-29 ENCOUNTER — OFFICE VISIT (OUTPATIENT)
Dept: FAMILY MEDICINE CLINIC | Facility: CLINIC | Age: 46
End: 2021-06-29

## 2021-06-29 VITALS — TEMPERATURE: 98.9 F | WEIGHT: 223.6 LBS | BODY MASS INDEX: 34.92 KG/M2

## 2021-06-29 DIAGNOSIS — M54.42 ACUTE BILATERAL LOW BACK PAIN WITH LEFT-SIDED SCIATICA: ICD-10-CM

## 2021-06-29 DIAGNOSIS — M99.08 SOMATIC DYSFUNCTION OF RIB CAGE REGION: Primary | ICD-10-CM

## 2021-06-29 DIAGNOSIS — M99.04 SOMATIC DYSFUNCTION OF SACRAL REGION: ICD-10-CM

## 2021-06-29 DIAGNOSIS — M99.02 SOMATIC DYSFUNCTION OF THORACIC REGION: ICD-10-CM

## 2021-06-29 DIAGNOSIS — M99.05 SOMATIC DYSFUNCTION OF PELVIS REGION: ICD-10-CM

## 2021-06-29 PROCEDURE — 99213 OFFICE O/P EST LOW 20 MIN: CPT | Performed by: FAMILY MEDICINE

## 2021-06-29 NOTE — PROGRESS NOTES
The Assessment/Plan     Acute bilateral low back pain with left-sided sciatica  Tolerated OMT well today with immediate improvement of symptoms, follow-up OMT in 2 weeks   back stretches printout provided   continue heat and ice, tizanidine as needed     Diagnoses and all orders for this visit:    Somatic dysfunction of rib cage region    Somatic dysfunction of pelvis region    Somatic dysfunction of sacral region    Somatic dysfunction of thoracic region    Acute bilateral low back pain with left-sided sciatica         This is a 50 y o  female who presents for OMT follow-up  1  Patient tolerated OMT well for the above problems, advised patient to drink fluids and can use NSAID for soreness after treatment  2  OMT Follow up in 2 weeks  Elicia Quiroz is a 50 y o  female and is here for a OMT follow up  The patient reports persistent bilateral low back pain including midline, occasional sciatica of left leg, chronic trapezius tension  She last had OMT about a year ago with good relief of symptoms  She feels more comfortable [facility] and would like to resume OMT for the above issues  She was taking Tizanadine with some relief of spasms, otherwise not taking any medications  Today feels like left trapezius and left side of neck are specially tense, concern for oncoming migraine as this is had a typically present for her  Is the patient taking Pain medication? no  Has the patient completed physical therapy for this condition? no  Did Patient symptoms improve from last OMT appointment? n/a    The following portions of the patient's history were reviewed and updated as appropriate: allergies, current medications, past family history, past medical history, past social history, past surgical history and problem list     Review of Systems  Review of Systems   Constitutional: Negative for fatigue  Genitourinary: Negative for flank pain     Musculoskeletal: Positive for back pain and neck stiffness  Negative for myalgias and neck pain  Neurological: Negative for headaches           Objective     OMT Exam   Head:   Somatic Dysfunction:  Tissue Texture Changes  Severity :  1  Osteopathic Findings: suboccital hypertonicity  Treatment Method: OAD  Response: resolved  Cervical:   Somatic Dysfunction:  Tissue Texture Changes  Severity :  1  Osteopathic Findings: Bilateral paraspinal hypertonicity  Treatment Method: ST and MFR  Response: improved  Thoracic T1-4:   Somatic Dysfunction:  Tissue Texture Changes, Asymmetry  and Tenderness  Severity :  3  Osteopathic Findings: Left trapezius and rhomboids ropy musculature compared to right  Treatment Method: ME, ST and MFR  Response: improved  Lumbar:  Somatic Dysfunction:  Tissue Texture Changes and Tenderness  Severity :  2  Osteopathic Findings: Bilateral paraspinal hypertonicity  Treatment Method: ST, MFR and CS  Response: improved  Sacrum:  Somatic Dysfunction:  Tissue Texture Changes, Restriction and Tenderness  Severity :  3  Osteopathic Findings: Sacrum and mobile, especially restricted at base, SI joints compressed bilaterally with tenderness on palpation  Treatment Method: ME, ST, MFR and CS  Response: resolved  Pelvis:  Somatic Dysfunction:  Asymmetry  and Restriction  Severity :  2  Osteopathic Findings: Right innominate anterior  Treatment Method: ME  Response: resolved  Ribs:  Somatic Dysfunction:  Asymmetry , Restriction and Tenderness  Severity :  3  Osteopathic Findings:  left 1st rib inhaled  Treatment Method: ME  Response: resolved

## 2021-06-29 NOTE — PATIENT INSTRUCTIONS
Lower Back Exercises   WHAT YOU NEED TO KNOW:   What do I need to know about lower back exercises? Lower back exercises help heal and strengthen your back muscles to prevent another injury  Ask your healthcare provider if you need to see a physical therapist for more advanced exercises  · Do the exercises on a mat or firm surface  (not on a bed) to support your spine and prevent low back pain  · Move slowly and smoothly  Avoid fast or jerky motions  · Breathe normally  Do not hold your breath  · Stop if you feel pain  It is normal to feel some discomfort at first  Regular exercise will help decrease your discomfort over time  How do I perform lower back exercises safely? Your healthcare provider may recommend that you do back exercises 10 to 30 minutes each day  He may also recommend that you do exercises 1 to 3 times each day  Ask your healthcare provider which exercises are best for you and how often to do them  · Ankle pumps:  Lie on your back  Move your foot up (with your toes pointing toward your head)  Then, move your foot down (with your toes pointing away from you)  Repeat this exercise 10 times on each side  · Heel slides:  Lie on your back  Slowly bend one leg and then straighten it  Next, bend the other leg and then straighten it  Repeat 10 times on each side  · Pelvic tilt:  Lie on your back with your knees bent and feet flat on the floor  Place your arms in a relaxed position beside your body  Tighten the muscles of your abdomen and flatten your back against the floor  Hold for 5 seconds  Repeat 5 times  · Back stretch:  Lie on your back with your hands behind your head  Bend your knees and turn the lower half of your body to one side  Hold this position for 10 seconds  Repeat 3 times on each side  · Straight leg raises:  Lie on your back with one leg straight  Bend the other knee   Tighten your abdomen and then slowly lift the straight leg up about 6 to 12 inches off the floor  Hold for 1 to 5 seconds  Lower your leg slowly  Repeat 10 times on each leg  · Knee-to-chest:  Lie on your back with your knees bent and feet flat on the floor  Pull one of your knees toward your chest and hold it there for 5 seconds  Return your leg to the starting position  Lift the other knee toward your chest and hold for 5 seconds  Do this 5 times on each side  · Cat and camel:  Place your hands and knees on the floor  Arch your back upward toward the ceiling and lower your head  Round out your spine as much as you can  Hold for 5 seconds  Lift your head upward and push your chest downward toward the floor  Hold for 5 seconds  Do 3 sets or as directed  · Wall squats:  Stand with your back against a wall  Tighten the muscles of your abdomen  Slowly lower your body until your knees are bent at a 45 degree angle  Hold this position for 5 seconds  Slowly move back up to a standing position  Repeat 10 times  · Curl up:  Lie on your back with your knees bent and feet flat on the floor  Place your hands, palms down, underneath the curve in your lower back  Next, with your elbows on the floor, lift your shoulders and chest 2 to 3 inches  Keep your head in line with your shoulders  Hold this position for 5 seconds  When you can do this exercise without pain for 10 to 15 seconds, you may add a rotation  While your shoulders and chest are lifted off the ground, turn slightly to the left and hold  Repeat on the other side  · Bird dog:  Place your hands and knees on the floor  Keep your wrists directly below your shoulders and your knees directly below your hips  Pull your belly button in toward your spine  Do not flatten or arch your back  Tighten your abdominal muscles  Raise one arm straight out so that it is aligned with your head  Next, raise the leg opposite your arm  Hold this position for 15 seconds  Lower your arm and leg slowly and change sides  Do 5 sets  When should I seek immediate care? · You have severe pain that prevents you from moving  When should I contact my healthcare provider? · Your pain becomes worse  · You have new pain  · You have questions or concerns about your condition or care  CARE AGREEMENT:   You have the right to help plan your care  Learn about your health condition and how it may be treated  Discuss treatment options with your healthcare providers to decide what care you want to receive  You always have the right to refuse treatment  The above information is an  only  It is not intended as medical advice for individual conditions or treatments  Talk to your doctor, nurse or pharmacist before following any medical regimen to see if it is safe and effective for you  © Copyright [address] Information is for End User's use only and may not be sold, redistributed or otherwise used for commercial purposes  All illustrations and images included in CareNotes® are the copyrighted property of A D A M , Inc  or [employer] [facility]  Neck Exercises   WHAT YOU NEED TO KNOW:   Why is it important to do neck exercises? Neck exercises help reduce neck pain, and improve neck movement and strength  Neck exercises also help prevent long-term neck problems  What do I need to know about neck exercises? · Do the exercises every day,  or as often as directed by your healthcare provider  · Move slowly, gently, and smoothly  Avoid fast or jerky motions  · Stand and sit the way your healthcare provider shows you  Good posture may reduce your neck pain  Check your posture often, even when you are not doing your neck exercises  How do I perform neck exercises safely? · Exercise position:  You may sit or stand while you do neck exercises  Face forward  Your shoulders should be straight and relaxed, with a good posture           · Head tilts, forward and back:  Gently bow your head and try to touch your chin to your chest  Your healthcare provider may tell you to push on the back of your neck to help bow your head  Raise your chin back to the starting position  Tilt your head back as far as possible so you are looking up at the ceiling  Your healthcare provider may tell you to lift your chin to help tilt your head back  Return your head to the starting position  · Head tilts, side to side:  Tilt your head, bringing your ear toward your shoulder  Then tilt your head toward the other shoulder  · Head turns:  Turn your head to look over your shoulder  Tilt your chin down and try to touch it to your shoulder  Do not raise your shoulder to your chin  Face forward again  Do the same on the other side  · Head rolls:  Slowly bring your chin toward your chest  Next, roll your head to the right  Your ear should be positioned over your shoulder  Hold this position for 5 seconds  Roll your head back toward your chest and to the left into the same position  Hold for 5 seconds  Gently roll your head back and around in a clockwise [place] 3 times  Next, move your head in the reverse direction (counterclockwise) in a [place] 3 times  Do not shrug your shoulders upwards while you do this exercise  When should I contact my healthcare provider? · Your pain does not get better, or gets worse  · You have questions or concerns about your condition, care, or exercise program     CARE AGREEMENT:   You have the right to help plan your care  Learn about your health condition and how it may be treated  Discuss treatment options with your healthcare providers to decide what care you want to receive  You always have the right to refuse treatment  The above information is an  only  It is not intended as medical advice for individual conditions or treatments  Talk to your doctor, nurse or pharmacist before following any medical regimen to see if it is safe and effective for you    © Copyright [address] Information is for [employer] use only and may not be sold, redistributed or otherwise used for commercial purposes   All illustrations and images included in CareNotes® are the copyrighted property of A D A M , Inc  or [address] [place], [place] [address]khloe milagro

## 2021-06-29 NOTE — ASSESSMENT & PLAN NOTE
Tolerated OMT well today with immediate improvement of symptoms, follow-up OMT in 2 weeks   back stretches printout provided   continue heat and ice, tizanidine as needed

## 2021-07-06 DIAGNOSIS — G43.909 MIGRAINE WITHOUT STATUS MIGRAINOSUS, NOT INTRACTABLE, UNSPECIFIED MIGRAINE TYPE: ICD-10-CM

## 2021-07-06 RX ORDER — RIZATRIPTAN BENZOATE 10 MG/1
10 TABLET ORAL ONCE AS NEEDED
Qty: 9 TABLET | Refills: 0 | Status: SHIPPED | OUTPATIENT
Start: 2021-07-06 | End: 2021-08-02 | Stop reason: SDUPTHER

## 2021-07-12 DIAGNOSIS — F17.200 TOBACCO DEPENDENCE: ICD-10-CM

## 2021-07-12 RX ORDER — VARENICLINE TARTRATE 1 MG/1
1 TABLET, FILM COATED ORAL 2 TIMES DAILY
Qty: 60 TABLET | Refills: 2 | Status: SHIPPED | OUTPATIENT
Start: 2021-07-12 | End: 2022-05-31

## 2021-07-13 ENCOUNTER — OFFICE VISIT (OUTPATIENT)
Dept: FAMILY MEDICINE CLINIC | Facility: CLINIC | Age: 46
End: 2021-07-13

## 2021-07-13 VITALS — WEIGHT: 224 LBS | TEMPERATURE: 98.6 F | HEIGHT: 67 IN | BODY MASS INDEX: 35.16 KG/M2

## 2021-07-13 DIAGNOSIS — M99.04 SOMATIC DYSFUNCTION OF SACRAL REGION: ICD-10-CM

## 2021-07-13 DIAGNOSIS — M99.05 SOMATIC DYSFUNCTION OF PELVIS REGION: ICD-10-CM

## 2021-07-13 DIAGNOSIS — M54.42 ACUTE BILATERAL LOW BACK PAIN WITH LEFT-SIDED SCIATICA: Primary | ICD-10-CM

## 2021-07-13 DIAGNOSIS — M99.03 SOMATIC DYSFUNCTION OF LUMBAR REGION: ICD-10-CM

## 2021-07-13 PROCEDURE — 99213 OFFICE O/P EST LOW 20 MIN: CPT | Performed by: FAMILY MEDICINE

## 2021-07-13 NOTE — PROGRESS NOTES
The Assessment/Plan     Acute bilateral low back pain with left-sided sciatica  Tolerated OMT well today  F/U OMT in one week  Continue Tizanidine, supportive care     Diagnoses and all orders for this visit:    Acute bilateral low back pain with left-sided sciatica    Somatic dysfunction of lumbar region    Somatic dysfunction of sacral region    Somatic dysfunction of pelvis region         This is a 50 y o  female who presents for OMT follow-up  1  Patient tolerated OMT well for the above problems,  advised patient to drink fluids and can use NSAID for soreness after treatment     2  OMT Follow up in 1 weeks  Elicia Saenz is a 50 y o  female and is here for a OMT follow up  The patient reports initial improvement in symptoms following last OMT appointment, then slow worsening over the last week or so  Today she has bilateral low back pain and intermittent L sciatica  Her chronic trapezius tension is persistent  She has been using Tizanidine HS prn and stretching with some intermittent relief  Is the patient taking Pain medication? no  Has the patient completed physical therapy for this condition? no  Did Patient symptoms improve from last OMT appointment?  yes    The following portions of the patient's history were reviewed and updated as appropriate: allergies, current medications, past family history, past medical history, past social history, past surgical history and problem list     Review of Systems  Review of Systems      Objective     OMT Exam   Head:   Somatic Dysfunction:  Tissue Texture Changes  Severity :  2  Osteopathic Findings: suboccipital hypertonicity  Treatment Method: OAD  Response: resolved  Thoracic T1-4:   Somatic Dysfunction:  Tissue Texture Changes and Tenderness  Severity :  3  Osteopathic Findings: L > R trap hypertonicity  Treatment Method: ST, MFR and CS  Response: improved  Lumbar:  Somatic Dysfunction:  Tissue Texture Changes and Tenderness  Severity : 1  Osteopathic Findings: bilateral paraspinal hypertonicity at LS junction  Treatment Method: ST, MFR and CS  Response: resolved  Sacrum:  Somatic Dysfunction:  Restriction  Severity :  2  Osteopathic Findings: sacrum immobile, R SI joint tender  Treatment Method: ME and MFR  Response: improved  Pelvis:  Somatic Dysfunction:  Asymmetry   Severity :  3  Osteopathic Findings: R innominate anterior  Treatment Method: ME  Response: resolved  Ribs:  Somatic Dysfunction:  Asymmetry   Severity :  3  Osteopathic Findings: L first rib inhaled  Treatment Method: ME  Response: resolved

## 2021-07-20 ENCOUNTER — OFFICE VISIT (OUTPATIENT)
Dept: FAMILY MEDICINE CLINIC | Facility: CLINIC | Age: 46
End: 2021-07-20

## 2021-07-20 VITALS
BODY MASS INDEX: 35.16 KG/M2 | DIASTOLIC BLOOD PRESSURE: 90 MMHG | WEIGHT: 224 LBS | HEART RATE: 95 BPM | OXYGEN SATURATION: 97 % | TEMPERATURE: 97.8 F | HEIGHT: 67 IN | SYSTOLIC BLOOD PRESSURE: 148 MMHG | RESPIRATION RATE: 18 BRPM

## 2021-07-20 DIAGNOSIS — F17.200 TOBACCO DEPENDENCE: Primary | ICD-10-CM

## 2021-07-20 DIAGNOSIS — E66.9 OBESITY (BMI 30.0-34.9): ICD-10-CM

## 2021-07-20 PROCEDURE — 99214 OFFICE O/P EST MOD 30 MIN: CPT | Performed by: FAMILY MEDICINE

## 2021-07-20 NOTE — PROGRESS NOTES
Assessment/Plan:    Tobacco dependence  Patient successfully stopped smoking after beginning Chantix; resumed after unable to refill medication    Patient will reach out to insurance company to see if prior authorization is required; patient will call office if prior authorization is needed        Unable to lose weight  Patient is obese; BMI is 34 98  No complaints with use of topirmate  Patient is disappointed for not having lost weight since beginning medication    Start metformin 500 mg once daily for 2-3 days with biggest meal for weight loss  Patient counseled to increase dose to 1,000 mg once daily for 2-3 days and then increase to maximum of 1,500 mg once daily if no side effects are noted; patient counseled that use of metformin may cause diarrhea  Patient will continue working on diet and exercise for weight management  Follow up in 2 weeks         Diagnoses and all orders for this visit:    Tobacco dependence    Obesity (BMI 30 0-34 9)  -     metFORMIN (GLUCOPHAGE) 500 mg tablet; Take 1 tablet (500 mg total) by mouth 3 (three) times a day        Subjective:      Patient ID: Hemant Buchanan is a 50 y o  female  Patient presents for a 6-week follow-up for smoking cessation and weight management  Pt reports no complaints on Chantix and had stopped smoking until her prescription was unable to be filled on 7/14/2021 due to problems with insurance  Since then, she reports smoking 3 cigarettes most days  Pt reports taking topiramate 50 mg BID as directed without physical complaints but is disappointed as she has not experienced any weight gain  Pt reports eating 6 small meals a day, consisting of fruits, vegetables, leans meats and limited carbohydrates  She has also eliminated soda completely from her diet and has been trying to drink more water  She reports being unable to go to the gym due to hip pain, which is currently being treated with OMT  She has been swimming for exercise            The following portions of the patient's history were reviewed and updated as appropriate: She  has a past medical history of Abnormal uterine bleeding, Back pain, Back pain with sciatica, Cancer ([address] ), Chronic pain, Chronic pain disorder, Class 2 obesity with body mass index (BMI) of 36 0 to 36 9 in adult (6/4/2019), Endometriosis, Fibromyalgia, primary, Insomnia, and Migraine  She   Patient Active Problem List    Diagnosis Date Noted    Somatic dysfunction of lumbar region 07/13/2021    Somatic dysfunction of sacral region 07/13/2021    Somatic dysfunction of pelvis region 07/13/2021    Tobacco dependence 06/11/2021    Vaginal pain 11/03/2020    Frequency of urination 11/03/2020    Unable to lose weight 01/02/2020    Abdominal bloating 01/02/2020    Plantar fasciitis, bilateral 10/07/2019    Elevated blood pressure reading 10/07/2019    Migraine without status migrainosus, not intractable 06/17/2019    Encounter for annual wellness exam in Medicare patient 06/17/2019    Chronic pain of both ankles 06/17/2019    Easy bruising 47/91/8664    Systolic murmur 23/09/3454    Obesity (BMI 30 0-34 9) 05/14/2019    Carpal tunnel syndrome of left wrist 05/06/2019    Tendinitis, de Quervain's 04/01/2019    Pain in left wrist 04/01/2019    Acute bilateral low back pain with left-sided sciatica 11/13/2014    Fibromyalgia 04/24/2014    Insomnia 04/24/2014    Chronic low back pain 04/24/2014    Class 2 obesity with body mass index (BMI) of 36 0 to 36 9 in adult 04/24/2014    Bipolar disorder ([facility] [address] ) 04/24/2014     She  has a past surgical history that includes Hysterectomy (2004); Colonoscopy; LAPAROSCOPY; pr revise median n/carpal tunnel surg (Left, 5/16/2019); and pr incis tendon sheath,radial styloid (Left, 5/16/2019)    Her family history includes Bipolar disorder in her daughter and mother; Diabetes type II in her maternal grandmother and mother; Heart disease in her mother; Kidney disease in her mother; Leukemia in her maternal grandfather; Liver disease in her mother; Lung cancer in her maternal aunt; No Known Problems in her son; Other in her father; Thyroid disease in her maternal aunt  She  reports that she has been smoking cigarettes  She has been smoking about 0 50 packs per day  She has never used smokeless tobacco  She reports previous alcohol use  She reports that she does not use drugs  Current Outpatient Medications   Medication Sig Dispense Refill    ibuprofen (MOTRIN) 600 mg tablet Take 1 tablet (600 mg total) by mouth every 6 (six) hours as needed for mild pain 30 tablet 1    rizatriptan (MAXALT) 10 MG tablet Take 1 tablet (10 mg total) by mouth once as needed for migraine for up to 9 doses May repeat in 2 hours if needed 9 tablet 0    tiZANidine (ZANAFLEX) 4 mg tablet Take 1 tablet (4 mg total) by mouth every 8 (eight) hours as needed for muscle spasms 90 tablet 1    topiramate (TOPAMAX) 50 MG tablet Take 1 tablet (50 mg total) by mouth 2 (two) times a day 60 tablet 2    traZODone (DESYREL) 150 mg tablet Take 1 tablet (150 mg total) by mouth daily at bedtime 90 tablet 1    varenicline (CHANTIX) 1 mg tablet Take 1 tablet (1 mg total) by mouth 2 (two) times a day 60 tablet 2    metFORMIN (GLUCOPHAGE) 500 mg tablet Take 1 tablet (500 mg total) by mouth 3 (three) times a day 90 tablet 0     No current facility-administered medications for this visit       Current Outpatient Medications on File Prior to Visit   Medication Sig    ibuprofen (MOTRIN) 600 mg tablet Take 1 tablet (600 mg total) by mouth every 6 (six) hours as needed for mild pain    rizatriptan (MAXALT) 10 MG tablet Take 1 tablet (10 mg total) by mouth once as needed for migraine for up to 9 doses May repeat in 2 hours if needed    tiZANidine (ZANAFLEX) 4 mg tablet Take 1 tablet (4 mg total) by mouth every 8 (eight) hours as needed for muscle spasms    topiramate (TOPAMAX) 50 MG tablet Take 1 tablet (50 mg total) by mouth 2 (two) times a day  traZODone (DESYREL) 150 mg tablet Take 1 tablet (150 mg total) by mouth daily at bedtime    varenicline (CHANTIX) 1 mg tablet Take 1 tablet (1 mg total) by mouth 2 (two) times a day     No current facility-administered medications on file prior to visit  She is allergic to penicillins       Review of Systems   Constitutional: Negative for appetite change, fatigue and unexpected weight change  HENT: Negative for sneezing  Eyes: Negative for discharge and redness  Cardiovascular: Negative for leg swelling  Gastrointestinal: Negative for diarrhea  Endocrine: Negative for polyphagia  Musculoskeletal: Positive for back pain and myalgias  Skin: Negative for rash and wound  Psychiatric/Behavioral: Negative for agitation and dysphoric mood  Objective:      /90 (BP Location: Left arm, Patient Position: Sitting, Cuff Size: Large)   Pulse 95   Temp 97 8 °F (36 6 °C) (Temporal)   Resp 18   Ht 5' 7 1" (1 704 m)   Wt 102 kg (224 lb)   LMP 01/05/2005   SpO2 97%   BMI 34 98 kg/m²          Physical Exam  Constitutional:       Appearance: Normal appearance  She is obese  HENT:      Head: Normocephalic and atraumatic  Cardiovascular:      Rate and Rhythm: Normal rate  Pulmonary:      Effort: Pulmonary effort is normal    Neurological:      Mental Status: She is alert and oriented to person, place, and time  Mental status is at baseline  Psychiatric:         Mood and Affect: Mood normal          Behavior: Behavior normal          Thought Content:  Thought content normal          Judgment: Judgment normal

## 2021-07-20 NOTE — ASSESSMENT & PLAN NOTE
Patient successfully stopped smoking after beginning Chantix; resumed after unable to refill medication    Patient will reach out to insurance company to see if prior authorization is required; patient will call office if prior authorization is needed

## 2021-07-20 NOTE — ASSESSMENT & PLAN NOTE
Patient is obese; BMI is 34 98  No complaints with use of topirmate  Patient is disappointed for not having lost weight since beginning medication    Start metformin 500 mg once daily for 2-3 days with biggest meal for weight loss  Patient counseled to increase dose to 1,000 mg once daily for 2-3 days and then increase to maximum of 1,500 mg once daily if no side effects are noted; patient counseled that use of metformin may cause diarrhea  Patient will continue working on diet and exercise for weight management  Follow up in 2 weeks

## 2021-07-22 ENCOUNTER — OFFICE VISIT (OUTPATIENT)
Dept: FAMILY MEDICINE CLINIC | Facility: CLINIC | Age: 46
End: 2021-07-22

## 2021-07-22 VITALS — TEMPERATURE: 97.8 F | WEIGHT: 224 LBS | BODY MASS INDEX: 34.98 KG/M2

## 2021-07-22 DIAGNOSIS — M54.42 ACUTE BILATERAL LOW BACK PAIN WITH LEFT-SIDED SCIATICA: ICD-10-CM

## 2021-07-22 DIAGNOSIS — M99.04 SOMATIC DYSFUNCTION OF SACRAL REGION: ICD-10-CM

## 2021-07-22 DIAGNOSIS — M99.05 SOMATIC DYSFUNCTION OF PELVIS REGION: Primary | ICD-10-CM

## 2021-07-22 PROCEDURE — 99213 OFFICE O/P EST LOW 20 MIN: CPT | Performed by: FAMILY MEDICINE

## 2021-07-22 NOTE — PROGRESS NOTES
The Assessment/Plan     Acute bilateral low back pain with left-sided sciatica  Tolerated OMT well today, return in 1 week for next treatment  Continue Tizanidine prn and supportive care otherwise  Diagnoses and all orders for this visit:    Somatic dysfunction of pelvis region    Somatic dysfunction of sacral region    Acute bilateral low back pain with left-sided sciatica         This is a 50 y o  female who presents for OMT follow-up  1  Patient tolerated OMT well for the above problems,  advised patient to drink fluids and can use NSAID for soreness after treatment     2  OMT Follow up in 1 weeks  Elicia Man is a 50 y o  female and is here for a OMT follow up  The patient reports pain improved since last OMT visit  She believes OMT to neck might have triggered a migraine and neck feels good today so she would prefer to avoid treating that area  Today she has some right hip/SI and low back pain, and some upper thoracic pain  Is the patient taking Pain medication? no  Has the patient completed physical therapy for this condition? no  Did Patient symptoms improve from last OMT appointment? yes    The following portions of the patient's history were reviewed and updated as appropriate: allergies, current medications, past family history, past medical history, past social history, past surgical history and problem list     Review of Systems  Review of Systems   Constitutional: Negative for chills, fatigue and fever  Cardiovascular: Negative for chest pain  Gastrointestinal: Negative for abdominal pain  Genitourinary: Negative for flank pain  Musculoskeletal: Positive for arthralgias and back pain  Negative for myalgias and neck pain           Objective     OMT Exam   Thoracic T1-4:   Somatic Dysfunction:  Tissue Texture Changes and Tenderness  Severity :  2  Osteopathic Findings: bilateral trap tenderness and hypertonicity, R rhomboid tenderpoint  Treatment Method: ME, ST, RAJWINDERR and CS  Response: improved  Sacrum:  Somatic Dysfunction:  Restriction and Tenderness  Severity :  2  Osteopathic Findings: right SI tenderness, contraction  Treatment Method: CS  Response: resolved  Pelvis:  Somatic Dysfunction:  Asymmetry  and Restriction  Severity :  2  Osteopathic Findings: R innominate anterior  Treatment Method: ME  Response: resolved

## 2021-07-22 NOTE — ASSESSMENT & PLAN NOTE
Tolerated OMT well today, return in 1 week for next treatment  Continue Tizanidine prn and supportive care otherwise

## 2021-08-02 DIAGNOSIS — G43.909 MIGRAINE WITHOUT STATUS MIGRAINOSUS, NOT INTRACTABLE, UNSPECIFIED MIGRAINE TYPE: ICD-10-CM

## 2021-08-02 RX ORDER — RIZATRIPTAN BENZOATE 10 MG/1
10 TABLET ORAL ONCE AS NEEDED
Qty: 9 TABLET | Refills: 0 | Status: SHIPPED | OUTPATIENT
Start: 2021-08-02 | End: 2022-01-05 | Stop reason: SDUPTHER

## 2021-08-16 DIAGNOSIS — E66.9 OBESITY (BMI 30.0-34.9): ICD-10-CM

## 2021-09-01 ENCOUNTER — OFFICE VISIT (OUTPATIENT)
Dept: OBGYN CLINIC | Facility: CLINIC | Age: 46
End: 2021-09-01

## 2021-09-01 VITALS
WEIGHT: 224 LBS | DIASTOLIC BLOOD PRESSURE: 80 MMHG | HEIGHT: 67 IN | BODY MASS INDEX: 35.16 KG/M2 | SYSTOLIC BLOOD PRESSURE: 135 MMHG | HEART RATE: 105 BPM

## 2021-09-01 DIAGNOSIS — N95.2 VAGINAL ATROPHY: ICD-10-CM

## 2021-09-01 DIAGNOSIS — B96.89 BACTERIAL VAGINOSIS: Primary | ICD-10-CM

## 2021-09-01 DIAGNOSIS — N76.0 BACTERIAL VAGINOSIS: Primary | ICD-10-CM

## 2021-09-01 DIAGNOSIS — G43.909 MIGRAINE WITHOUT STATUS MIGRAINOSUS, NOT INTRACTABLE, UNSPECIFIED MIGRAINE TYPE: ICD-10-CM

## 2021-09-01 DIAGNOSIS — N89.8 VAGINAL DISCHARGE: ICD-10-CM

## 2021-09-01 LAB
BV WHIFF TEST VAG QL: POSITIVE
CLUE CELLS SPEC QL WET PREP: POSITIVE
PH SMN: 5 [PH]
SL AMB POCT WET MOUNT: ABNORMAL
T VAGINALIS VAG QL WET PREP: NEGATIVE
YEAST VAG QL WET PREP: NEGATIVE

## 2021-09-01 PROCEDURE — 87210 SMEAR WET MOUNT SALINE/INK: CPT | Performed by: STUDENT IN AN ORGANIZED HEALTH CARE EDUCATION/TRAINING PROGRAM

## 2021-09-01 PROCEDURE — 99203 OFFICE O/P NEW LOW 30 MIN: CPT | Performed by: STUDENT IN AN ORGANIZED HEALTH CARE EDUCATION/TRAINING PROGRAM

## 2021-09-01 RX ORDER — METRONIDAZOLE 500 MG/1
500 TABLET ORAL 2 TIMES DAILY
Qty: 14 TABLET | Refills: 0 | Status: SHIPPED | OUTPATIENT
Start: 2021-09-01 | End: 2021-09-08

## 2021-09-01 RX ORDER — TOPIRAMATE 50 MG/1
50 TABLET, FILM COATED ORAL 2 TIMES DAILY
Qty: 60 TABLET | Refills: 5 | Status: SHIPPED | OUTPATIENT
Start: 2021-09-01 | End: 2022-03-01 | Stop reason: SDUPTHER

## 2021-09-01 NOTE — PROGRESS NOTES
PROBLEM GYNECOLOGICAL VISIT    Hemant Buchanan is a 50 y o  female who presents today with complaint of vaginal irritation and vaginal odor    Her general medical history has been reviewed and she reports it as follows:    Past Medical History:   Diagnosis Date    Abnormal uterine bleeding     Back pain     Back pain with sciatica     Cancer (HCC)     cervical    Chronic pain     Chronic pain disorder     back & neck    Class 2 obesity with body mass index (BMI) of 36 0 to 36 9 in adult 2019    Endometriosis     Fibromyalgia, primary     Insomnia     Migraine      Past Surgical History:   Procedure Laterality Date    COLONOSCOPY      HYSTERECTOMY      AUB    LAPAROSCOPY      adhesions    SD INCIS TENDON SHEATH,RADIAL STYLOID Left 2019    Procedure: RELEASE DE QUERVAIN'S LEFT WRIST;  Surgeon: Bairon Garrison MD;  Location:  MAIN OR;  Service: Orthopedics    SD REVISE MEDIAN N/CARPAL TUNNEL SURG Left 2019    Procedure: RELEASE LEFT CARPAL TUNNEL;  Surgeon: Bairon Garrison MD;  Location:  MAIN OR;  Service: Orthopedics     OB History        4    Para   4    Term                AB        Living   3       SAB        TAB        Ectopic        Multiple        Live Births   4           Obstetric Comments   All            Social History     Tobacco Use    Smoking status: Current Every Day Smoker     Packs/day: 0 50     Types: Cigarettes    Smokeless tobacco: Never Used   Vaping Use    Vaping Use: Never used   Substance Use Topics    Alcohol use: Not Currently     Comment: socially     Drug use: No       Current Outpatient Medications   Medication Instructions    ibuprofen (MOTRIN) 600 mg, Oral, Every 6 hours PRN    metFORMIN (GLUCOPHAGE) 500 mg, Oral, 3 times daily    metroNIDAZOLE (FLAGYL) 500 mg, Oral, 2 times daily    rizatriptan (MAXALT) 10 mg, Oral, Once as needed, May repeat in 2 hours if needed    tiZANidine (ZANAFLEX) 4 mg, Oral, Every 8 hours PRN    topiramate (TOPAMAX) 50 mg, Oral, 2 times daily    traZODone (DESYREL) 150 mg, Oral, Daily at bedtime    varenicline (CHANTIX) 1 mg, Oral, 2 times daily       History of Present Illness: Av Paredes presents today reporting a 2 month history of vaginal irritation and vaginal odor  She thought at first she had a yeast infection and she self treated with monistat  She had a brief improvement in symptoms, but symptoms have since returned  She reports the odor is severe and she has had to throw away some of her underwear  She reports a thin, watery vaginal discharge  Has not been sexually active for 6 years  Review of Systems:  Review of Systems   Constitutional: Negative  Gastrointestinal: Negative  Genitourinary: Positive for vaginal discharge  Vaginal odor, vaginal irritation       Physical Exam:  /80   Pulse 105   Ht 5' 7 1" (1 704 m)   Wt 102 kg (224 lb)   LMP 01/05/2005   BMI 34 98 kg/m²   Physical Exam  Constitutional:       General: She is not in acute distress  Appearance: Normal appearance  Genitourinary:      Vulva, urethra and bladder normal       Vaginal discharge, erythema and atrophy present  Vaginal exam comments: Small amount of thin white discharge   Uterus is absent  Right adnexa absent  Left adnexa absent  Neurological:      Mental Status: She is alert  Skin:     General: Skin is warm and dry  Psychiatric:         Mood and Affect: Mood normal          Behavior: Behavior normal    Vitals reviewed  Point of Care Testing:   -Wet mount: 5   -KOH mount: +clue cells    -Whiff: positive    Assessment:   1  Bacterial vaginosis   2  Vaginal atrophy    Plan:   1  Rx for Flagyl  Instructed to avoid alcohol during treatment  2  Vulvar skin care measures discussed      3  Return to office for annual exam

## 2021-09-22 ENCOUNTER — TELEPHONE (OUTPATIENT)
Dept: FAMILY MEDICINE CLINIC | Facility: CLINIC | Age: 46
End: 2021-09-22

## 2021-09-22 ENCOUNTER — ANNUAL EXAM (OUTPATIENT)
Dept: OBGYN CLINIC | Facility: CLINIC | Age: 46
End: 2021-09-22

## 2021-09-22 VITALS
DIASTOLIC BLOOD PRESSURE: 85 MMHG | HEART RATE: 90 BPM | BODY MASS INDEX: 32.9 KG/M2 | WEIGHT: 209.6 LBS | SYSTOLIC BLOOD PRESSURE: 126 MMHG | HEIGHT: 67 IN

## 2021-09-22 DIAGNOSIS — Z72.0 CURRENTLY ATTEMPTING TO QUIT SMOKING: Primary | ICD-10-CM

## 2021-09-22 DIAGNOSIS — Z12.4 SCREENING FOR CERVICAL CANCER: ICD-10-CM

## 2021-09-22 DIAGNOSIS — N95.2 VAGINAL ATROPHY: ICD-10-CM

## 2021-09-22 PROCEDURE — G0476 HPV COMBO ASSAY CA SCREEN: HCPCS | Performed by: NURSE PRACTITIONER

## 2021-09-22 PROCEDURE — G0145 SCR C/V CYTO,THINLAYER,RESCR: HCPCS | Performed by: NURSE PRACTITIONER

## 2021-09-22 PROCEDURE — 99396 PREV VISIT EST AGE 40-64: CPT | Performed by: NURSE PRACTITIONER

## 2021-09-22 NOTE — PROGRESS NOTES
Wilda Celeste is a 50 y o  female who presents today for annual GYN exam   Her last pap smear was performed many years ago per patient  She had a hysterectomy in  due to AUB and what she describes as significantly abnormal cervical cells  She believes she was told to continue pap smears after her surgery  No records available  Her last mammogram was performed  and result was BI-RADS 1, she is scheduled for a repeat mammogram 2021  She is currently reporting issues with vaginal discomfort and pain  She is not currently sexually active, but would like to be soon  She states over the past few years she has has changes in her vagina and she is no longer able to insert her finger into her vagina without discomfort   Her general medical history has been reviewed and she reports it as follows:    Past Medical History:   Diagnosis Date    Abnormal uterine bleeding     Back pain     Back pain with sciatica     Cancer (HCC)     cervical    Chronic pain     Chronic pain disorder     back & neck    Class 2 obesity with body mass index (BMI) of 36 0 to 36 9 in adult 2019    Endometriosis     Fibromyalgia, primary     Insomnia     Migraine      Past Surgical History:   Procedure Laterality Date    COLONOSCOPY      HYSTERECTOMY      AUB    LAPAROSCOPY      adhesions    AZ INCIS TENDON SHEATH,RADIAL STYLOID Left 2019    Procedure: RELEASE DE Rozanne Alexandria LEFT WRIST;  Surgeon: Jaja Randhawa MD;  Location: [facility] MAIN OR;  Service: Orthopedics    AZ REVISE MEDIAN N/CARPAL TUNNEL SURG Left 2019    Procedure: RELEASE LEFT CARPAL TUNNEL;  Surgeon: Jaja Randhawa MD;  Location: [facility] MAIN OR;  Service: Orthopedics     OB History        4    Para   4    Term                AB        Living   3       SAB        TAB        Ectopic        Multiple        Live Births   4           Obstetric Comments   All            Social History     Tobacco Use    Smoking status: Current Every Day Smoker     Packs/day: 0 50     Types: Cigarettes    Smokeless tobacco: Never Used   Vaping Use    Vaping Use: Never used   Substance Use Topics    Alcohol use: Not Currently     Comment: socially     Drug use: No     Cancer-related family history includes Lung cancer in her maternal aunt  Current Outpatient Medications   Medication Instructions    ibuprofen (MOTRIN) 600 mg, Oral, Every 6 hours PRN    metFORMIN (GLUCOPHAGE) 500 mg, Oral, 3 times daily    rizatriptan (MAXALT) 10 mg, Oral, Once as needed, May repeat in 2 hours if needed    tiZANidine (ZANAFLEX) 4 mg, Oral, Every 8 hours PRN    topiramate (TOPAMAX) 50 mg, Oral, 2 times daily    traZODone (DESYREL) 150 mg, Oral, Daily at bedtime    varenicline (CHANTIX) 1 mg, Oral, 2 times daily       Review of Systems:  Review of Systems   Constitutional: Negative  Gastrointestinal: Negative  Genitourinary: Positive for vaginal pain  Physical Exam:  Ht 5' 7" (1 702 m)   Wt 95 1 kg (209 lb 9 6 oz)   LMP 01/05/2005   BMI 32 83 kg/m²   Physical Exam  Constitutional:       General: She is not in acute distress  Appearance: Normal appearance  Genitourinary:      Vulva, inguinal canal, urethra, bladder, vagina, right adnexa and left adnexa normal       Vaginal atrophy present  Cervix is absent  Uterus is absent  Cardiovascular:      Rate and Rhythm: Normal rate and regular rhythm  Pulmonary:      Effort: Pulmonary effort is normal       Breath sounds: Normal breath sounds  Chest:      Breasts:         Right: Normal          Left: Normal    Abdominal:      General: Abdomen is flat  Palpations: Abdomen is soft  Musculoskeletal:      Cervical back: Neck supple  Neurological:      Mental Status: She is alert  Skin:     General: Skin is warm and dry  Psychiatric:         Mood and Affect: Mood normal          Behavior: Behavior normal    Vitals reviewed  Assessment/Plan:   1  Normal well-woman GYN exam   2  Cervical cancer screening:  Cervix is surgically absent  Due to reports of significant cervical cells a vaginal Pap smear was collected with HPV co-testing  3  STD screening:  Patient declines  4  Breast cancer screening:  Normal breast exam  Mammogram is scheduled  Reviewed breast self-awareness  5  Depression Screening: Patient's depression screening was assessed with a PHQ-2 score of   Their PHQ-9 score was   Clinically patient does not have depression  No treatment is required  6  BMI Counseling: Body mass index is 32 83 kg/m²  Discussed the patient's BMI with her  The BMI is above average  She is following with her PCP for weight management and has lost over 20 pounds so far  7  Tobacco Cessation Counseling: Tobacco cessation counseling and education was provided  The patient is sincerely urged to quit consumption of tobacco  She is ready to quit tobacco  The numerous health risks of tobacco consumption were discussed  Patient was provided a referral for tobacco cessation management  8  Discussed vulvovaginal atrophy and painful intercourse  She would like to trial vaginal estrogen cream  Directions, risks/benefits reviewed  9  Return to office in one year for annual exam or PRN

## 2021-09-24 LAB
HPV HR 12 DNA CVX QL NAA+PROBE: NEGATIVE
HPV16 DNA CVX QL NAA+PROBE: NEGATIVE
HPV18 DNA CVX QL NAA+PROBE: NEGATIVE

## 2021-09-29 ENCOUNTER — TELEPHONE (OUTPATIENT)
Dept: OBGYN CLINIC | Facility: CLINIC | Age: 46
End: 2021-09-29

## 2021-09-29 LAB
LAB AP GYN PRIMARY INTERPRETATION: NORMAL
Lab: NORMAL

## 2021-09-29 NOTE — TELEPHONE ENCOUNTER
----- Message from [address] [place] [address] sent at 9/29/2021  2:54 PM EDT -----  Please let her know that her pap is normal  Thank you!

## 2021-10-13 ENCOUNTER — PATIENT OUTREACH (OUTPATIENT)
Dept: OTHER | Facility: CLINIC | Age: 46
End: 2021-10-13

## 2021-10-17 ENCOUNTER — NURSE TRIAGE (OUTPATIENT)
Dept: OTHER | Facility: OTHER | Age: 46
End: 2021-10-17

## 2021-10-17 ENCOUNTER — AMB VIDEO VISIT (OUTPATIENT)
Dept: OTHER | Facility: HOSPITAL | Age: 46
End: 2021-10-17

## 2021-10-17 PROCEDURE — ECARE PR SL URGENT CARE VIRTUAL VISIT: Performed by: FAMILY MEDICINE

## 2021-10-21 ENCOUNTER — TELEMEDICINE (OUTPATIENT)
Dept: FAMILY MEDICINE CLINIC | Facility: CLINIC | Age: 46
End: 2021-10-21

## 2021-10-21 DIAGNOSIS — J06.9 UPPER RESPIRATORY TRACT INFECTION, UNSPECIFIED TYPE: Primary | ICD-10-CM

## 2021-10-21 PROCEDURE — 99213 OFFICE O/P EST LOW 20 MIN: CPT | Performed by: FAMILY MEDICINE

## 2021-10-21 RX ORDER — PREDNISONE 20 MG/1
TABLET ORAL
COMMUNITY
Start: 2021-10-17 | End: 2022-05-31

## 2021-10-21 RX ORDER — AZITHROMYCIN 250 MG/1
TABLET, FILM COATED ORAL
COMMUNITY
Start: 2021-10-17 | End: 2022-05-31

## 2021-10-21 RX ORDER — ALBUTEROL SULFATE 90 UG/1
AEROSOL, METERED RESPIRATORY (INHALATION)
COMMUNITY
Start: 2021-10-17 | End: 2022-05-31

## 2021-10-22 ENCOUNTER — HOSPITAL ENCOUNTER (EMERGENCY)
Facility: HOSPITAL | Age: 46
Discharge: HOME | End: 2021-10-22
Attending: EMERGENCY MEDICINE
Payer: MEDICARE

## 2021-10-22 ENCOUNTER — APPOINTMENT (EMERGENCY)
Dept: RADIOLOGY | Facility: HOSPITAL | Age: 46
End: 2021-10-22
Payer: MEDICARE

## 2021-10-22 VITALS
RESPIRATION RATE: 22 BRPM | DIASTOLIC BLOOD PRESSURE: 85 MMHG | HEART RATE: 99 BPM | SYSTOLIC BLOOD PRESSURE: 160 MMHG | OXYGEN SATURATION: 95 % | TEMPERATURE: 98.9 F

## 2021-10-22 DIAGNOSIS — R05.9 COUGH: Primary | ICD-10-CM

## 2021-10-22 DIAGNOSIS — R06.2 WHEEZING: ICD-10-CM

## 2021-10-22 PROCEDURE — U0003 INFECTIOUS AGENT DETECTION BY NUCLEIC ACID (DNA OR RNA); SEVERE ACUTE RESPIRATORY SYNDROME CORONAVIRUS 2 (SARS-COV-2) (CORONAVIRUS DISEASE [COVID-19]), AMPLIFIED PROBE TECHNIQUE, MAKING USE OF HIGH THROUGHPUT TECHNOLOGIES AS DESCRIBED BY CMS-2020-01-R: HCPCS | Performed by: STUDENT IN AN ORGANIZED HEALTH CARE EDUCATION/TRAINING PROGRAM

## 2021-10-22 PROCEDURE — 71045 X-RAY EXAM CHEST 1 VIEW: CPT

## 2021-10-22 PROCEDURE — 94640 AIRWAY INHALATION TREATMENT: CPT

## 2021-10-22 PROCEDURE — 99284 EMERGENCY DEPT VISIT MOD MDM: CPT | Performed by: EMERGENCY MEDICINE

## 2021-10-22 PROCEDURE — U0005 INFEC AGEN DETEC AMPLI PROBE: HCPCS | Performed by: STUDENT IN AN ORGANIZED HEALTH CARE EDUCATION/TRAINING PROGRAM

## 2021-10-22 PROCEDURE — 99285 EMERGENCY DEPT VISIT HI MDM: CPT

## 2021-10-22 RX ORDER — ALBUTEROL SULFATE 2.5 MG/3ML
5 SOLUTION RESPIRATORY (INHALATION) ONCE
Status: COMPLETED | OUTPATIENT
Start: 2021-10-22 | End: 2021-10-22

## 2021-10-22 RX ORDER — ALBUTEROL SULFATE 2.5 MG/3ML
5 SOLUTION RESPIRATORY (INHALATION) ONCE
Status: DISCONTINUED | OUTPATIENT
Start: 2021-10-22 | End: 2021-10-22

## 2021-10-22 RX ORDER — DOXYCYCLINE HYCLATE 100 MG/1
100 CAPSULE ORAL 2 TIMES DAILY
Qty: 10 CAPSULE | Refills: 0 | Status: SHIPPED | OUTPATIENT
Start: 2021-10-22 | End: 2021-10-27

## 2021-10-22 RX ORDER — ALBUTEROL SULFATE 90 UG/1
2 AEROSOL, METERED RESPIRATORY (INHALATION) ONCE
Status: DISCONTINUED | OUTPATIENT
Start: 2021-10-22 | End: 2021-10-22 | Stop reason: HOSPADM

## 2021-10-22 RX ADMIN — ALBUTEROL SULFATE 5 MG: 2.5 SOLUTION RESPIRATORY (INHALATION) at 10:55

## 2021-10-22 RX ADMIN — IPRATROPIUM BROMIDE 0.5 MG: 0.5 SOLUTION RESPIRATORY (INHALATION) at 10:55

## 2021-10-23 LAB — SARS-COV-2 RNA RESP QL NAA+PROBE: NEGATIVE

## 2021-10-28 ENCOUNTER — OFFICE VISIT (OUTPATIENT)
Dept: FAMILY MEDICINE CLINIC | Facility: CLINIC | Age: 46
End: 2021-10-28

## 2021-10-28 VITALS
HEART RATE: 93 BPM | TEMPERATURE: 98.9 F | BODY MASS INDEX: 32.46 KG/M2 | SYSTOLIC BLOOD PRESSURE: 144 MMHG | RESPIRATION RATE: 16 BRPM | OXYGEN SATURATION: 95 % | DIASTOLIC BLOOD PRESSURE: 82 MMHG | HEIGHT: 67 IN | WEIGHT: 206.8 LBS

## 2021-10-28 DIAGNOSIS — J40 BRONCHITIS: Primary | ICD-10-CM

## 2021-10-28 PROCEDURE — 99213 OFFICE O/P EST LOW 20 MIN: CPT | Performed by: FAMILY MEDICINE

## 2021-10-28 RX ORDER — GUAIFENESIN 600 MG
1200 TABLET, EXTENDED RELEASE 12 HR ORAL EVERY 12 HOURS SCHEDULED
Qty: 28 TABLET | Refills: 0 | Status: SHIPPED | OUTPATIENT
Start: 2021-10-28 | End: 2021-11-04

## 2021-10-28 RX ORDER — BENZONATATE 100 MG/1
100 CAPSULE ORAL 3 TIMES DAILY PRN
Qty: 20 CAPSULE | Refills: 0 | Status: SHIPPED | OUTPATIENT
Start: 2021-10-28 | End: 2022-05-31

## 2021-11-04 ENCOUNTER — OFFICE VISIT (OUTPATIENT)
Dept: FAMILY MEDICINE CLINIC | Facility: CLINIC | Age: 46
End: 2021-11-04

## 2021-11-04 VITALS — TEMPERATURE: 98.8 F

## 2021-11-04 DIAGNOSIS — M99.05 SOMATIC DYSFUNCTION OF PELVIS REGION: ICD-10-CM

## 2021-11-04 DIAGNOSIS — M99.08 SOMATIC DYSFUNCTION OF RIB CAGE REGION: ICD-10-CM

## 2021-11-04 DIAGNOSIS — M99.00 SOMATIC DYSFUNCTION OF HEAD REGION: ICD-10-CM

## 2021-11-04 DIAGNOSIS — M99.01 SOMATIC DYSFUNCTION OF CERVICAL REGION: ICD-10-CM

## 2021-11-04 DIAGNOSIS — R07.81 RIB PAIN ON LEFT SIDE: Primary | ICD-10-CM

## 2021-11-04 DIAGNOSIS — M25.559 HIP PAIN: ICD-10-CM

## 2021-11-04 PROCEDURE — 99213 OFFICE O/P EST LOW 20 MIN: CPT | Performed by: FAMILY MEDICINE

## 2021-12-01 DIAGNOSIS — G47.00 INSOMNIA, UNSPECIFIED TYPE: ICD-10-CM

## 2021-12-02 DIAGNOSIS — M54.40 CHRONIC MIDLINE LOW BACK PAIN WITH SCIATICA, SCIATICA LATERALITY UNSPECIFIED: ICD-10-CM

## 2021-12-02 DIAGNOSIS — G89.29 CHRONIC MIDLINE LOW BACK PAIN WITH SCIATICA, SCIATICA LATERALITY UNSPECIFIED: ICD-10-CM

## 2021-12-02 RX ORDER — TRAZODONE HYDROCHLORIDE 150 MG/1
150 TABLET ORAL
Qty: 90 TABLET | Refills: 1 | Status: SHIPPED | OUTPATIENT
Start: 2021-12-02 | End: 2022-05-31

## 2021-12-03 RX ORDER — TIZANIDINE 4 MG/1
4 TABLET ORAL EVERY 8 HOURS PRN
Qty: 90 TABLET | Refills: 0 | Status: SHIPPED | OUTPATIENT
Start: 2021-12-03 | End: 2021-12-27 | Stop reason: SDUPTHER

## 2021-12-21 ENCOUNTER — OFFICE VISIT (OUTPATIENT)
Dept: FAMILY MEDICINE CLINIC | Facility: CLINIC | Age: 46
End: 2021-12-21

## 2021-12-21 VITALS — WEIGHT: 201 LBS | BODY MASS INDEX: 31.48 KG/M2

## 2021-12-21 DIAGNOSIS — M99.02 SOMATIC DYSFUNCTION OF THORACIC REGION: ICD-10-CM

## 2021-12-21 DIAGNOSIS — M25.559 HIP PAIN: Primary | ICD-10-CM

## 2021-12-21 DIAGNOSIS — M99.05 SOMATIC DYSFUNCTION OF PELVIS REGION: ICD-10-CM

## 2021-12-21 DIAGNOSIS — M99.01 SOMATIC DYSFUNCTION OF CERVICAL REGION: ICD-10-CM

## 2021-12-21 PROCEDURE — 99213 OFFICE O/P EST LOW 20 MIN: CPT | Performed by: FAMILY MEDICINE

## 2021-12-27 DIAGNOSIS — M54.40 CHRONIC MIDLINE LOW BACK PAIN WITH SCIATICA, SCIATICA LATERALITY UNSPECIFIED: ICD-10-CM

## 2021-12-27 DIAGNOSIS — G89.29 CHRONIC MIDLINE LOW BACK PAIN WITH SCIATICA, SCIATICA LATERALITY UNSPECIFIED: ICD-10-CM

## 2021-12-27 RX ORDER — TIZANIDINE 4 MG/1
4 TABLET ORAL EVERY 8 HOURS PRN
Qty: 90 TABLET | Refills: 0 | Status: SHIPPED | OUTPATIENT
Start: 2021-12-27 | End: 2022-02-11 | Stop reason: SDUPTHER

## 2022-01-05 DIAGNOSIS — M54.40 CHRONIC MIDLINE LOW BACK PAIN WITH SCIATICA, SCIATICA LATERALITY UNSPECIFIED: ICD-10-CM

## 2022-01-05 DIAGNOSIS — G89.29 CHRONIC MIDLINE LOW BACK PAIN WITH SCIATICA, SCIATICA LATERALITY UNSPECIFIED: ICD-10-CM

## 2022-01-05 DIAGNOSIS — G43.909 MIGRAINE WITHOUT STATUS MIGRAINOSUS, NOT INTRACTABLE, UNSPECIFIED MIGRAINE TYPE: ICD-10-CM

## 2022-01-05 NOTE — TELEPHONE ENCOUNTER
Patient asking for medication refill for the following    Ibuprofen 600mg    Rizatriptan 10mg    Patient had to r/s appt due to dr Dylan Crystal not being in office  R/S 1/13/2022 at 3pm     Dr Dylan Crystal is not in office again until 1/13/2022

## 2022-01-07 RX ORDER — RIZATRIPTAN BENZOATE 10 MG/1
10 TABLET ORAL ONCE AS NEEDED
Qty: 9 TABLET | Refills: 0 | Status: SHIPPED | OUTPATIENT
Start: 2022-01-07 | End: 2022-02-14 | Stop reason: SDUPTHER

## 2022-01-07 RX ORDER — IBUPROFEN 600 MG/1
600 TABLET ORAL EVERY 6 HOURS PRN
Qty: 30 TABLET | Refills: 1 | Status: SHIPPED | OUTPATIENT
Start: 2022-01-07 | End: 2022-05-31 | Stop reason: SDUPTHER

## 2022-02-09 ENCOUNTER — TELEPHONE (OUTPATIENT)
Dept: FAMILY MEDICINE CLINIC | Facility: CLINIC | Age: 47
End: 2022-02-09

## 2022-02-09 NOTE — TELEPHONE ENCOUNTER
[facility] requests refill  rizatriptan (MAXALT) 10 MG tablet       Tried the Refill Medication

## 2022-02-11 DIAGNOSIS — G89.29 CHRONIC MIDLINE LOW BACK PAIN WITH SCIATICA, SCIATICA LATERALITY UNSPECIFIED: ICD-10-CM

## 2022-02-11 DIAGNOSIS — M54.40 CHRONIC MIDLINE LOW BACK PAIN WITH SCIATICA, SCIATICA LATERALITY UNSPECIFIED: ICD-10-CM

## 2022-02-11 RX ORDER — TIZANIDINE 4 MG/1
4 TABLET ORAL EVERY 8 HOURS PRN
Qty: 90 TABLET | Refills: 0 | Status: SHIPPED | OUTPATIENT
Start: 2022-02-11 | End: 2022-05-31 | Stop reason: SDUPTHER

## 2022-02-14 DIAGNOSIS — G43.909 MIGRAINE WITHOUT STATUS MIGRAINOSUS, NOT INTRACTABLE, UNSPECIFIED MIGRAINE TYPE: ICD-10-CM

## 2022-02-14 RX ORDER — RIZATRIPTAN BENZOATE 10 MG/1
10 TABLET ORAL ONCE AS NEEDED
Qty: 9 TABLET | Refills: 0 | Status: SHIPPED | OUTPATIENT
Start: 2022-02-14 | End: 2022-02-28

## 2022-02-15 DIAGNOSIS — E66.9 OBESITY (BMI 30.0-34.9): ICD-10-CM

## 2022-02-15 NOTE — TELEPHONE ENCOUNTER
Patient needs appointment, has not been seen last summer for problem related to this medication  Please schedule

## 2022-02-24 DIAGNOSIS — G43.909 MIGRAINE WITHOUT STATUS MIGRAINOSUS, NOT INTRACTABLE, UNSPECIFIED MIGRAINE TYPE: ICD-10-CM

## 2022-02-24 RX ORDER — TOPIRAMATE 50 MG/1
50 TABLET, FILM COATED ORAL 2 TIMES DAILY
Qty: 60 TABLET | Refills: 5 | OUTPATIENT
Start: 2022-02-24

## 2022-02-28 ENCOUNTER — OFFICE VISIT (OUTPATIENT)
Dept: FAMILY MEDICINE CLINIC | Facility: CLINIC | Age: 47
End: 2022-02-28

## 2022-02-28 VITALS
OXYGEN SATURATION: 98 % | SYSTOLIC BLOOD PRESSURE: 156 MMHG | HEIGHT: 67 IN | BODY MASS INDEX: 32.3 KG/M2 | RESPIRATION RATE: 18 BRPM | WEIGHT: 205.8 LBS | HEART RATE: 86 BPM | TEMPERATURE: 98.2 F | DIASTOLIC BLOOD PRESSURE: 100 MMHG

## 2022-02-28 DIAGNOSIS — R03.0 ELEVATED BP WITHOUT DIAGNOSIS OF HYPERTENSION: ICD-10-CM

## 2022-02-28 DIAGNOSIS — L28.1 PICKER'S NODULE: ICD-10-CM

## 2022-02-28 DIAGNOSIS — Z71.6 ENCOUNTER FOR SMOKING CESSATION COUNSELING: ICD-10-CM

## 2022-02-28 DIAGNOSIS — G43.909 MIGRAINE WITHOUT STATUS MIGRAINOSUS, NOT INTRACTABLE, UNSPECIFIED MIGRAINE TYPE: Primary | ICD-10-CM

## 2022-02-28 PROCEDURE — 99213 OFFICE O/P EST LOW 20 MIN: CPT | Performed by: FAMILY MEDICINE

## 2022-02-28 RX ORDER — PROPRANOLOL HYDROCHLORIDE 10 MG/1
10 TABLET ORAL 2 TIMES DAILY
Qty: 60 TABLET | Refills: 0 | Status: SHIPPED | OUTPATIENT
Start: 2022-02-28 | End: 2022-03-25

## 2022-02-28 RX ORDER — SUMATRIPTAN 25 MG/1
25 TABLET, FILM COATED ORAL ONCE AS NEEDED
Qty: 30 TABLET | Refills: 0 | Status: SHIPPED | OUTPATIENT
Start: 2022-02-28

## 2022-02-28 NOTE — PROGRESS NOTES
Assessment/Plan:    Migraine without status migrainosus, not intractable  Worsening recently, no identifiable triggers  Does not feel Maxalt is working as well as previously, will trial Imitrex instead  Start Propranolol for migraine ppx  Will schedule for OMT  Continue home neck stretches  Also discussed smoking cessation - she is working on it      's nodule  Non healing scab on left trap region  Frequently re-irritated with excoriation, hydrogen peroxide, alcohol - advised to discontinue and allow to heal  Also some tiny scabs on the scalp she noticed incidentally when washing hair - no pruritis or pain, no hair loss  Recommend gentle shampoo and monitoring  Follow up if worsening or as needed    Elevated BP without diagnosis of hypertension  /100 today, she states she is in pain because of recent migraines  Recommend home monitoring qAM x2 weeks and follow up with BP log to determine if antihypertensives are appropriate      Encounter for smoking cessation counseling  Tobacco Cessation Counseling: Tobacco cessation counseling and education was provided  The patient is sincerely urged to quit consumption of tobacco  She is not ready to quit tobacco  The numerous health risks of tobacco consumption were discussed  If she decides to quit, there are a number of helpful adjunctive aids  She has already tried Chantix which is no longer covered by insurance  She is unable to take Wellbutrin due to manic episodes  She is working on cutting down  Diagnoses and all orders for this visit:    Migraine without status migrainosus, not intractable, unspecified migraine type  -     SUMAtriptan (Imitrex) 25 mg tablet; Take 1 tablet (25 mg total) by mouth once as needed for migraine for up to 1 dose  -     propranolol (INDERAL) 10 mg tablet;  Take 1 tablet (10 mg total) by mouth 2 (two) times a day    's nodule    Encounter for smoking cessation counseling    Elevated BP without diagnosis of hypertension  -     Blood Pressure Monitoring (Comfort Touch BP Cuff/Large) MISC; Use 1 each in the morning          Subjective:      Patient ID: Sravani Kim is a 48 y o  female  Patient presents for evaluation of multiple issues  She has a history of migraine headaches, ran out of Maxalt recently but states it has not been as helpful recently  She would like to try a different abortive medication  She also has been having increased frequency of migraines, about 3 per week, and is agreeable to a trial of ppx  She also reports a lump in the left shoulder that is bothersome for the last month  She noticed a small scab and has repeatedly excoriated it, also has been applying hydrogen peroxide and alcohol daily to clean it  She feels it is more bothersome when she has a migraine  She also reports tiny scabs on her scalp for the past 2 weeks  She has avoided hair bleach since then  They are not itchy or painful, noticed incidentally when she scratched her head  Denies hair loss and none is apparent  The following portions of the patient's history were reviewed and updated as appropriate: allergies, current medications, past family history, past medical history, past social history, past surgical history and problem list     Review of Systems   HENT: Negative for congestion  Respiratory: Negative for cough and shortness of breath  Cardiovascular: Negative for chest pain  Gastrointestinal: Negative for abdominal pain  Musculoskeletal: Negative for back pain  Neurological: Positive for headaches  Objective:      /100 (BP Location: Left arm, Patient Position: Sitting, Cuff Size: Standard)   Pulse 86   Temp 98 2 °F (36 8 °C) (Temporal)   Resp 18   Ht 5' 7" (1 702 m)   Wt 93 4 kg (205 lb 12 8 oz)   LMP 01/05/2005   SpO2 98%   BMI 32 23 kg/m²          Physical Exam  Constitutional:       Appearance: Normal appearance  HENT:      Head: Normocephalic and atraumatic  Comments: Tiny scabs, scant, throughout scalp, without surrounding erythema or hair loss  Non pruritic  Right Ear: External ear normal       Left Ear: External ear normal       Nose: Nose normal    Eyes:      Extraocular Movements: Extraocular movements intact  Conjunctiva/sclera: Conjunctivae normal    Cardiovascular:      Rate and Rhythm: Normal rate  Pulmonary:      Effort: Pulmonary effort is normal    Abdominal:      General: Abdomen is flat  Musculoskeletal:         General: Normal range of motion  Skin:     General: Skin is warm and dry  Comments: Small scab over left trap, no surrounding erythema or irritation  Neurological:      General: No focal deficit present  Mental Status: She is alert and oriented to person, place, and time     Psychiatric:         Mood and Affect: Mood normal          Behavior: Behavior normal

## 2022-02-28 NOTE — ASSESSMENT & PLAN NOTE
Tobacco Cessation Counseling: Tobacco cessation counseling and education was provided  The patient is sincerely urged to quit consumption of tobacco  She is not ready to quit tobacco  The numerous health risks of tobacco consumption were discussed  If she decides to quit, there are a number of helpful adjunctive aids  She has already tried Chantix which is no longer covered by insurance  She is unable to take Wellbutrin due to manic episodes  She is working on cutting down

## 2022-02-28 NOTE — ASSESSMENT & PLAN NOTE
Worsening recently, no identifiable triggers  Does not feel Maxalt is working as well as previously, will trial Imitrex instead  Start Propranolol for migraine ppx  Will schedule for OMT  Continue home neck stretches  Also discussed smoking cessation - she is working on it

## 2022-02-28 NOTE — ASSESSMENT & PLAN NOTE
/100 today, she states she is in pain because of recent migraines  Recommend home monitoring qAM x2 weeks and follow up with BP log to determine if antihypertensives are appropriate

## 2022-02-28 NOTE — ASSESSMENT & PLAN NOTE
Non healing scab on left trap region  Frequently re-irritated with excoriation, hydrogen peroxide, alcohol - advised to discontinue and allow to heal  Also some tiny scabs on the scalp she noticed incidentally when washing hair - no pruritis or pain, no hair loss  Recommend gentle shampoo and monitoring  Follow up if worsening or as needed

## 2022-03-01 DIAGNOSIS — G89.29 CHRONIC MIDLINE LOW BACK PAIN WITH SCIATICA, SCIATICA LATERALITY UNSPECIFIED: ICD-10-CM

## 2022-03-01 DIAGNOSIS — M54.40 CHRONIC MIDLINE LOW BACK PAIN WITH SCIATICA, SCIATICA LATERALITY UNSPECIFIED: ICD-10-CM

## 2022-03-01 DIAGNOSIS — G43.909 MIGRAINE WITHOUT STATUS MIGRAINOSUS, NOT INTRACTABLE, UNSPECIFIED MIGRAINE TYPE: ICD-10-CM

## 2022-03-01 RX ORDER — TIZANIDINE 4 MG/1
4 TABLET ORAL EVERY 8 HOURS PRN
Qty: 90 TABLET | Refills: 0 | OUTPATIENT
Start: 2022-03-01

## 2022-03-01 RX ORDER — TOPIRAMATE 50 MG/1
50 TABLET, FILM COATED ORAL 2 TIMES DAILY
Qty: 60 TABLET | Refills: 5 | Status: SHIPPED | OUTPATIENT
Start: 2022-03-01

## 2022-03-01 NOTE — TELEPHONE ENCOUNTER
Request of the medication was to soon  After 03/11/2022 we can refilled it again QTY:90 tabs   Thanks

## 2022-03-25 DIAGNOSIS — G43.909 MIGRAINE WITHOUT STATUS MIGRAINOSUS, NOT INTRACTABLE, UNSPECIFIED MIGRAINE TYPE: ICD-10-CM

## 2022-03-25 RX ORDER — PROPRANOLOL HYDROCHLORIDE 10 MG/1
TABLET ORAL
Qty: 60 TABLET | Refills: 0 | Status: SHIPPED | OUTPATIENT
Start: 2022-03-25 | End: 2022-04-08

## 2022-04-07 DIAGNOSIS — G43.909 MIGRAINE WITHOUT STATUS MIGRAINOSUS, NOT INTRACTABLE, UNSPECIFIED MIGRAINE TYPE: ICD-10-CM

## 2022-04-08 RX ORDER — PROPRANOLOL HYDROCHLORIDE 10 MG/1
TABLET ORAL
Qty: 180 TABLET | Refills: 1 | Status: SHIPPED | OUTPATIENT
Start: 2022-04-08 | End: 2022-05-31

## 2022-05-28 DIAGNOSIS — G47.00 INSOMNIA, UNSPECIFIED TYPE: ICD-10-CM

## 2022-05-31 ENCOUNTER — OFFICE VISIT (OUTPATIENT)
Dept: FAMILY MEDICINE CLINIC | Facility: CLINIC | Age: 47
End: 2022-05-31

## 2022-05-31 VITALS
TEMPERATURE: 98 F | RESPIRATION RATE: 18 BRPM | HEIGHT: 67 IN | WEIGHT: 210.6 LBS | SYSTOLIC BLOOD PRESSURE: 124 MMHG | HEART RATE: 85 BPM | OXYGEN SATURATION: 96 % | BODY MASS INDEX: 33.06 KG/M2 | DIASTOLIC BLOOD PRESSURE: 81 MMHG

## 2022-05-31 DIAGNOSIS — M54.40 CHRONIC MIDLINE LOW BACK PAIN WITH SCIATICA, SCIATICA LATERALITY UNSPECIFIED: ICD-10-CM

## 2022-05-31 DIAGNOSIS — E66.9 OBESITY (BMI 30.0-34.9): ICD-10-CM

## 2022-05-31 DIAGNOSIS — R05.9 COUGH: ICD-10-CM

## 2022-05-31 DIAGNOSIS — G89.29 CHRONIC MIDLINE LOW BACK PAIN WITH SCIATICA, SCIATICA LATERALITY UNSPECIFIED: ICD-10-CM

## 2022-05-31 DIAGNOSIS — M72.2 PLANTAR FASCIITIS, BILATERAL: ICD-10-CM

## 2022-05-31 DIAGNOSIS — J40 BRONCHITIS: Primary | ICD-10-CM

## 2022-05-31 PROCEDURE — 99213 OFFICE O/P EST LOW 20 MIN: CPT | Performed by: FAMILY MEDICINE

## 2022-05-31 RX ORDER — TIZANIDINE 4 MG/1
4 TABLET ORAL EVERY 8 HOURS PRN
Qty: 90 TABLET | Refills: 0 | Status: SHIPPED | OUTPATIENT
Start: 2022-05-31

## 2022-05-31 RX ORDER — ALBUTEROL SULFATE 90 UG/1
2 AEROSOL, METERED RESPIRATORY (INHALATION) EVERY 6 HOURS PRN
Qty: 8.5 G | Refills: 1 | Status: SHIPPED | OUTPATIENT
Start: 2022-05-31

## 2022-05-31 RX ORDER — TRAZODONE HYDROCHLORIDE 150 MG/1
TABLET ORAL
Qty: 90 TABLET | Refills: 1 | Status: SHIPPED | OUTPATIENT
Start: 2022-05-31

## 2022-05-31 RX ORDER — IBUPROFEN 600 MG/1
600 TABLET ORAL EVERY 6 HOURS PRN
Qty: 30 TABLET | Refills: 1 | Status: SHIPPED | OUTPATIENT
Start: 2022-05-31

## 2022-05-31 NOTE — PATIENT INSTRUCTIONS
Plantar Fasciitis   AMBULATORY CARE:   Plantar fasciitis  PF is swelling of the plantar fascia  The plantar fascia is a thick band of tissue that connects your heel bone to your toes  This part of your foot helps support the arch of your foot and absorbs shock  Tension and stress can cause small tears on the thick band of tissue  These small tears can grow larger with repeated stretching and tearing  The band of tissue can become swollen and painful  Signs and symptoms:   Pain on the bottom of your foot near your heel    Pain that is worse right after you get out of bed or after a long period of rest    Pain after activity    Stiffness in your foot    Heel swelling    Call your doctor if:   Your pain or swelling suddenly increase  You develop knee, hip, or back pain  You have questions or concerns about your condition or care  Treatment  may include any of the following:  Medicines  may be given to decrease swelling and pain  Shoe inserts, splints, or tape  help support your foot and decrease stress on your plantar fascia  A night splint may help stretch your plantar fascia while you sleep  Stretches and exercises  can help decrease pain and swelling  They can also help strengthen the muscles that support your heel and foot  Surgery  may be needed when other treatments do not work  During surgery, the plantar fascia is  from your heel  Self-care:   Wear your splint or shoe inserts as directed  You may need to wear a splint at night to keep your foot stretched while you sleep  This will help prevent sharp pain first thing in the morning  Shoe inserts will help decrease stress on your plantar fascia when you walk or exercise  Apply ice on your plantar fascia  Ice helps prevent tissue damage and decreases swelling and pain  Fill a water bottle with water and freeze it  Wrap a towel around the bottle or cover it with a pillow case   Roll the water bottle under your foot for 10 minutes in the morning and after work  Massage your plantar fascia as directed  This may help decrease swelling and pain  Roll a golf ball under your foot for 10 minutes  Repeat 3 times each day  Go to physical therapy as directed  A physical therapist teaches you exercises to help improve movement and strength, and to decrease pain  Prevent plantar fasciitis:   Maintain a healthy weight  This will help decrease stress on your feet  Ask your healthcare provider how much you should weigh  Ask him to help you create a weight loss plan if you are overweight  Do low-impact exercises  Low-impact exercises decrease stress on your plantar fascia  Examples include swimming or bicycling  Start new activities slowly  Increase the intensity and time gradually  Wear shoes that fit well and support your arch  Replace your shoes before the padding or shock absorption wears out  Do not walk or  bare feet or sandals for long periods of time  Follow up with your doctor as directed:  Write down your questions so you remember to ask them during your visits  © [employer] 2022 Information is for End User's use only and may not be sold, redistributed or otherwise used for commercial purposes  All illustrations and images included in CareNotes® are the copyrighted property of [employer]  or [address] [place], [place] [address]khloe milagro   The above information is an  only  It is not intended as medical advice for individual conditions or treatments  Talk to your doctor, nurse or pharmacist before following any medical regimen to see if it is safe and effective for you

## 2022-05-31 NOTE — ASSESSMENT & PLAN NOTE
Recurring, bilateral heel pain, positive calcaneal squeeze test but low suspicion for stress fracture at this time and pain/stretching with dorsiflexion of bilateral feet  Discussed supportive care including heel pads and custom shoe orthotics and icing  Will provide referral for PT as well

## 2022-05-31 NOTE — PROGRESS NOTES
Assessment/Plan:    Bronchitis  Mild case of bronchitis  - Cough without sputum production and mild chest tightness and tenderness after coughing episodes  - Lung CTAB on exam  - No need for Z pack or Prednisone therapy at this time, will prescribe Albuterol PRN for inflammation of the airways  - Follow-up if no improvement or worsening of cough    Plantar fasciitis, bilateral  Recurring, bilateral heel pain, positive calcaneal squeeze test but low suspicion for stress fracture at this time and pain/stretching with dorsiflexion of bilateral feet  Discussed supportive care including heel pads and custom shoe orthotics and icing  Will provide referral for PT as well  Diagnoses and all orders for this visit:    Bronchitis  -     albuterol (ProAir HFA) 90 mcg/act inhaler; Inhale 2 puffs every 6 (six) hours as needed for shortness of breath    Obesity (BMI 30 0-34 9)  -     metFORMIN (GLUCOPHAGE) 500 mg tablet; Take 1 tablet (500 mg total) by mouth 3 (three) times a day    Chronic midline low back pain with sciatica, sciatica laterality unspecified  -     tiZANidine (ZANAFLEX) 4 mg tablet; Take 1 tablet (4 mg total) by mouth every 8 (eight) hours as needed for muscle spasms  -     ibuprofen (MOTRIN) 600 mg tablet; Take 1 tablet (600 mg total) by mouth every 6 (six) hours as needed for mild pain    Cough  -     albuterol (ProAir HFA) 90 mcg/act inhaler; Inhale 2 puffs every 6 (six) hours as needed for shortness of breath    Plantar fasciitis, bilateral  -     Ambulatory Referral to Physical Therapy; Future          Subjective:      Patient ID: Brandy Henson is a 48 y o  female  - Patient here for her medication refill of her Metformin which she reports was initially helping with weight loss; however, she ran out of her prescription  She also needs a refill of her Zanaflex which she uses intermittently for low back pain and spasms with good results and prescription Ibuprofen        - Cough X 7 days, took a home covid test which was negative  She has been having rib pain from her cough and some chest tightness  She is having some SOB but denies wheezing at this time  She says the cough is worse at night  She is not coughing up any mucus  She says it does not feel like a tickle in her throat  She is still smoking 1 pack due to not having the Chantix  She says her cough is worse when she smokes  - Patient has pain in her heels bilaterally for 1 month  She has a history of plantar fasciitis  She say it is worse in the morning when she first gets out of bed and when she sits too long  She tried shoes with arches but was not able to wear them due to her feet swelling  The following portions of the patient's history were reviewed and updated as appropriate: allergies, current medications, past family history, past medical history, past social history, past surgical history and problem list     Review of Systems   Constitutional: Negative  HENT: Negative  Respiratory: Positive for cough, chest tightness and shortness of breath  Negative for wheezing  Cardiovascular: Negative for chest pain and palpitations  Musculoskeletal:        Heel pain bilaterally   Skin: Negative for rash and wound  Neurological: Negative for dizziness, weakness and headaches  Psychiatric/Behavioral: Negative  Objective:      /81 (BP Location: Right arm, Patient Position: Sitting, Cuff Size: Large)   Pulse 85   Temp 98 °F (36 7 °C) (Temporal)   Resp 18   Ht 5' 7" (1 702 m)   Wt 95 5 kg (210 lb 9 6 oz)   LMP 01/05/2005   SpO2 96%   BMI 32 98 kg/m²          Physical Exam  Vitals reviewed  Constitutional:       Appearance: Normal appearance  HENT:      Head: Normocephalic and atraumatic  Cardiovascular:      Rate and Rhythm: Normal rate and regular rhythm  Heart sounds: Normal heart sounds  Pulmonary:      Effort: Pulmonary effort is normal       Breath sounds: Normal breath sounds  No wheezing  Chest:      Chest wall: Tenderness present  Musculoskeletal:         General: Normal range of motion  Feet:      Comments: Bilateral heel pain with positive squeeze test, pain with dorsiflexion  Flat arches bilaterally  Skin:     General: Skin is warm  Neurological:      Mental Status: She is alert  Mental status is at baseline     Psychiatric:         Mood and Affect: Mood normal

## 2022-05-31 NOTE — ASSESSMENT & PLAN NOTE
Mild case of bronchitis  - Cough without sputum production and mild chest tightness and tenderness after coughing episodes  - Lung CTAB on exam  - No need for Z pack or Prednisone therapy at this time, will prescribe Albuterol PRN for inflammation of the airways  - Follow-up if no improvement or worsening of cough

## 2022-06-08 ENCOUNTER — TELEMEDICINE (OUTPATIENT)
Dept: FAMILY MEDICINE CLINIC | Facility: CLINIC | Age: 47
End: 2022-06-08

## 2022-06-08 DIAGNOSIS — J40 BRONCHITIS: Primary | ICD-10-CM

## 2022-06-08 PROCEDURE — 99213 OFFICE O/P EST LOW 20 MIN: CPT | Performed by: FAMILY MEDICINE

## 2022-06-08 RX ORDER — AZITHROMYCIN 250 MG/1
TABLET, FILM COATED ORAL
Qty: 6 TABLET | Refills: 0 | Status: SHIPPED | OUTPATIENT
Start: 2022-06-08 | End: 2022-06-13

## 2022-06-08 RX ORDER — BENZONATATE 100 MG/1
100 CAPSULE ORAL 3 TIMES DAILY PRN
Qty: 30 CAPSULE | Refills: 0 | Status: SHIPPED | OUTPATIENT
Start: 2022-06-08 | End: 2022-06-13 | Stop reason: SDUPTHER

## 2022-06-08 NOTE — ASSESSMENT & PLAN NOTE
Symptoms concerning for possible bronchitis  She has tried conservative management for about a week without any improvement  Symptoms appear to be worsening with fevers at this time  Recommend antibiotic treatment with azithromycin  Will also give her Tessalon Perles for cough  Discussed warning signs for worsening infection      Follow-up next week if symptoms persist

## 2022-06-08 NOTE — PROGRESS NOTES
Virtual Regular Visit    Verification of patient location:    Patient is located in the following state in which I hold an active license [place]      Assessment/Plan:    Problem List Items Addressed This Visit        Respiratory    Bronchitis - Primary       Symptoms concerning for possible bronchitis  She has tried conservative management for about a week without any improvement  Symptoms appear to be worsening with fevers at this time  Recommend antibiotic treatment with azithromycin  Will also give her Tessalon Perles for cough  Discussed warning signs for worsening infection  Follow-up next week if symptoms persist            Relevant Medications    azithromycin (Zithromax) 250 mg tablet    benzonatate (TESSALON PERLES) 100 mg capsule               Reason for visit is   Chief Complaint   Patient presents with    Shortness of Breath     Started last Saturday     Sore Throat    Cough     Coughing up phlegm     Earache    Fever     Since yesterday    Virtual Regular Visit        Encounter provider Leidy Ayala DO    Provider located at [address] [place] [id number] [phone]      Recent Visits  No visits were found meeting these conditions  Showing recent visits within past 7 days and meeting all other requirements  Today's Visits  Date Type Provider Dept   06/08/22 Telemedicine Leidy Ayala [address] [place] [address] today's visits and meeting all other requirements  Future Appointments  No visits were found meeting these conditions  Showing future appointments within next 150 days and meeting all other requirements       The patient was identified by name and date of birth  Freda Bustamante was informed that this is a telemedicine visit and that the visit is being conducted through [facility] and patient was informed that this is not a secure, HIPAA-compliant platform  She agrees to proceed     My office door was closed  No one else was in the room  She acknowledged consent and understanding of privacy and security of the video platform  The patient has agreed to participate and understands they can discontinue the visit at any time  Patient is aware this is a billable service  Subjective  Danika Joseph is a 48 y o  female    HPI   Patient reports about overall weeks of cough  She states this for her initial symptoms when she was at the office on 05/31/2022  She states over the last 3 days she has had fevers, as high as 101 F yesterday  She has also noticed some increased fatigue with occasional shortness of breath  She also reports some headache, but she states is likely secondary to coughing  She denies any chest pain, nausea vomiting      Past Medical History:   Diagnosis Date    Abnormal uterine bleeding     Back pain     Back pain with sciatica     Cancer (HCC)     cervical    Chronic pain     Chronic pain disorder     back & neck    Class 2 obesity with body mass index (BMI) of 36 0 to 36 9 in adult 6/4/2019    Endometriosis     Fibromyalgia, primary     Insomnia     Migraine        Past Surgical History:   Procedure Laterality Date    COLONOSCOPY      HYSTERECTOMY  2004    AUB    LAPAROSCOPY      adhesions    [place] INCIS TENDON SHEATH,RADIAL STYLOID Left 5/16/2019    Procedure: RELEASE DE QUERVAIN'S LEFT WRIST;  Surgeon: Radha Alvarez MD;  Location:  MAIN OR;  Service: Orthopedics    OR REVISE MEDIAN N/CARPAL TUNNEL SURG Left 5/16/2019    Procedure: RELEASE LEFT CARPAL TUNNEL;  Surgeon: Radha Alvarez MD;  Location: [facility] MAIN OR;  Service: Orthopedics       Current Outpatient Medications   Medication Sig Dispense Refill    albuterol (ProAir HFA) 90 mcg/act inhaler Inhale 2 puffs every 6 (six) hours as needed for shortness of breath 8 5 g 1    azithromycin (Zithromax) 250 mg tablet Take 2 tablets (500 mg total) by mouth daily for 1 day, THEN 1 tablet (250 mg total) daily for 4 days  6 tablet 0    benzonatate (TESSALON PERLES) 100 mg capsule Take 1 capsule (100 mg total) by mouth 3 (three) times a day as needed for cough 30 capsule 0    Blood Pressure Monitoring (Comfort Touch BP Cuff/Large) MISC Use 1 each in the morning 1 each 0    conjugated estrogens (PREMARIN) vaginal cream Insert 0 5 g into the vagina 2 (two) times a week 30 g 3    ibuprofen (MOTRIN) 600 mg tablet Take 1 tablet (600 mg total) by mouth every 6 (six) hours as needed for mild pain 30 tablet 1    metFORMIN (GLUCOPHAGE) 500 mg tablet Take 1 tablet (500 mg total) by mouth 3 (three) times a day 90 tablet 5    SUMAtriptan (Imitrex) 25 mg tablet Take 1 tablet (25 mg total) by mouth once as needed for migraine for up to 1 dose 30 tablet 0    tiZANidine (ZANAFLEX) 4 mg tablet Take 1 tablet (4 mg total) by mouth every 8 (eight) hours as needed for muscle spasms 90 tablet 0    topiramate (TOPAMAX) 50 MG tablet Take 1 tablet (50 mg total) by mouth 2 (two) times a day 60 tablet 5    traZODone (DESYREL) 150 mg tablet TAKE 1 TABLET BY MOUTH AT BEDTIME 90 tablet 1     No current facility-administered medications for this visit  Allergies   Allergen Reactions    Penicillins Shortness Of Breath and Hives       Review of Systems   Constitutional: Positive for fatigue and fever  Negative for chills  HENT: Negative for ear pain and sore throat  Eyes: Negative for pain and visual disturbance  Respiratory: Positive for cough  Negative for shortness of breath  Cardiovascular: Negative for chest pain and palpitations  Gastrointestinal: Negative for abdominal pain and vomiting  Genitourinary: Negative for dysuria and hematuria  Musculoskeletal: Negative for arthralgias and back pain  Skin: Negative for color change and rash  Neurological: Positive for headaches  Negative for seizures and syncope  All other systems reviewed and are negative        Video Exam    Vitals:       Physical Exam  Vitals reviewed  Neurological:      Mental Status: She is alert  I spent 25 minutes directly with the patient during this visit    VIRTUAL VISIT Allison Dobbs 32 verbally agrees to participate in [employer]  Pt is aware that [employer] could be limited without vital signs or the ability to perform a full hands-on physical Monica Nguyen understands she or the provider may request at any time to terminate the video visit and request the patient to seek care or treatment in person

## 2022-06-13 ENCOUNTER — TELEMEDICINE (OUTPATIENT)
Dept: FAMILY MEDICINE CLINIC | Facility: CLINIC | Age: 47
End: 2022-06-13

## 2022-06-13 DIAGNOSIS — J40 BRONCHITIS: ICD-10-CM

## 2022-06-13 PROCEDURE — G2025 DIS SITE TELE SVCS RHC/FQHC: HCPCS | Performed by: FAMILY MEDICINE

## 2022-06-13 RX ORDER — FLUTICASONE PROPIONATE 50 MCG
1 SPRAY, SUSPENSION (ML) NASAL DAILY
Qty: 16 G | Refills: 1 | Status: SHIPPED | OUTPATIENT
Start: 2022-06-13

## 2022-06-13 RX ORDER — BENZONATATE 100 MG/1
100 CAPSULE ORAL 3 TIMES DAILY PRN
Qty: 30 CAPSULE | Refills: 0 | Status: SHIPPED | OUTPATIENT
Start: 2022-06-13

## 2022-06-13 RX ORDER — BUDESONIDE AND FORMOTEROL FUMARATE DIHYDRATE 80; 4.5 UG/1; UG/1
2 AEROSOL RESPIRATORY (INHALATION) 2 TIMES DAILY
Qty: 10.2 G | Refills: 0 | Status: SHIPPED | OUTPATIENT
Start: 2022-06-13 | End: 2022-06-15 | Stop reason: ALTCHOICE

## 2022-06-13 RX ORDER — PREDNISONE 20 MG/1
40 TABLET ORAL DAILY
Qty: 10 TABLET | Refills: 0 | Status: SHIPPED | OUTPATIENT
Start: 2022-06-13 | End: 2022-06-18

## 2022-06-13 RX ORDER — BUDESONIDE 90 UG/1
1 AEROSOL, POWDER RESPIRATORY (INHALATION) 2 TIMES DAILY
Qty: 1 EACH | Refills: 0 | Status: CANCELLED | OUTPATIENT
Start: 2022-06-13

## 2022-06-13 NOTE — ASSESSMENT & PLAN NOTE
Symptoms unchanged since last visit, continues with cough productive of minimal clear sputum, postnasal drip, congestion and subjective low-grade fever  Has never been officially diagnosed with COPD but her sx could be 2/2 COPD in the setting of chronic tobacco use (since age 13), new-onset wheezing and persistent despite conservative management so she will benefit from having a PFT done - ordered    - will start a trial of Flonase for postnasal drip and congestion  - Now s/p 5-day course of Zithromax, will not repeat at this time  - Will treat as acute COPDe with 5-day course of Prednisone 40mg  - will start trial of Flonase 1 spray daily  - Start daily Symbicort 2 puffs BID  - Continue albuterol prn  - Follow-up PFT result  - Strict ED precautions given for worsening symptoms  - Follow-up in 1 week for recheck

## 2022-06-13 NOTE — PROGRESS NOTES
OFFICE VISIT NOTE - [facility]  Chapincito Ware 48 y o  (:1975) female MRN: [id number]  Unit/Bed#:  Encounter: [id number]    Virtual Regular Visit    Verification of patient location:    Patient is located in the following state in which I hold an active license [place]      Assessment/Plan:    Problem List Items Addressed This Visit        Respiratory    Bronchitis     Symptoms unchanged since last visit, continues with cough productive of minimal clear sputum, postnasal drip, congestion and subjective low-grade fever  Has never been officially diagnosed with COPD but her sx could be 2/2 COPD in the setting of chronic tobacco use (since age 13), new-onset wheezing and persistent despite conservative management so she will benefit from having a PFT done - ordered    - will start a trial of Flonase for postnasal drip and congestion  - Now s/p 5-day course of Zithromax, will not repeat at this time  - Will treat as acute COPDe with 5-day course of Prednisone 40mg  - will start trial of Flonase 1 spray daily  - Start daily Symbicort 2 puffs BID  - Continue albuterol prn  - Follow-up PFT result  - Strict ED precautions given for worsening symptoms  - Follow-up in 1 week for recheck           Relevant Medications    predniSONE 20 mg tablet    benzonatate (TESSALON PERLES) 100 mg capsule    fluticasone (FLONASE) 50 mcg/act nasal spray    budesonide-formoterol (Symbicort) 80-4 5 MCG/ACT inhaler    Other Relevant Orders    Complete PFT with post bronchodilator             Reason for visit is   Chief Complaint   Patient presents with    Virtual Regular Visit        Encounter provider Caterina Ewing MD    Provider located at [address] [place] [id number]  [phone]      Recent Visits  Date Type Provider Dept   22 [address] , [address] recent visits within past 7 days and meeting all other requirements  Today's Visits  Date Type Provider Dept   06/13/22 Telemedicine Colleen Tello MD [address] today's visits and meeting all other requirements  Future Appointments  No visits were found meeting these conditions  Showing future appointments within next 150 days and meeting all other requirements       The patient was identified by name and date of birth  Lauro Jones was informed that this is a telemedicine visit and that the visit is being conducted through Telephone  My office door was closed  No one else was in the room  She acknowledged consent and understanding of privacy and security of the video platform  The patient has agreed to participate and understands they can discontinue the visit at any time  It was my intent to perform this visit via video technology but the patient was not able to do a video connection so the visit was completed via audio telephone only  Patient is aware this is a billable service  Subjective  Lauro Jones is a 48 y o  female here for follow up of bronchitis consisting of cough which was initially dry but now gradually productive of sputum  Symptoms started 3 wks ago, she was initially evaluated on 5/31 in-person where she was started on albuterol prn  She was again evaluated on 6/8/2022 for worsening sx and treated with 5-day course of Azithromycin as well as Tessalon perles for cough  Of note, she has a long-term smoking history since the age of 13 but never diagnosed or hopsitalized for COPD  She tested herself for COVID when sx started but negative  She is vaccinated x 3  No recent travel  Today, patient reports that her sx are unchanged since last visit despite completing course of antibiotics but have not worsened  She denies chest pain, diarrhea, dizziness but endorses low grade fever, SOB, wheezing, congestion and postnasal drip        Past Medical History:   Diagnosis Date    Abnormal uterine bleeding     Back pain     Back pain with sciatica     Cancer (HCC)     cervical    Chronic pain     Chronic pain disorder     back & neck    Class 2 obesity with body mass index (BMI) of 36 0 to 36 9 in adult 6/4/2019    Endometriosis     Fibromyalgia, primary     Insomnia     Migraine        Past Surgical History:   Procedure Laterality Date    COLONOSCOPY      HYSTERECTOMY  2004    AUB    LAPAROSCOPY      adhesions    [place] INCIS TENDON SHEATH,RADIAL STYLOID Left 5/16/2019    Procedure: RELEASE DE QUERVAIN'S LEFT WRIST;  Surgeon: Renae Fraser MD;  Location:  MAIN OR;  Service: Orthopedics    [place] REVISE MEDIAN N/CARPAL TUNNEL SURG Left 5/16/2019    Procedure: RELEASE LEFT CARPAL TUNNEL;  Surgeon: Renae Fraser MD;  Location: [address] [place] [address] MAIN OR;  Service: Orthopedics       Current Outpatient Medications   Medication Sig Dispense Refill    benzonatate (TESSALON PERLES) 100 mg capsule Take 1 capsule (100 mg total) by mouth 3 (three) times a day as needed for cough 30 capsule 0    budesonide-formoterol (Symbicort) 80-4 5 MCG/ACT inhaler Inhale 2 puffs 2 (two) times a day Rinse mouth after use  10 2 g 0    fluticasone (FLONASE) 50 mcg/act nasal spray 1 spray into each nostril daily 16 g 1    predniSONE 20 mg tablet Take 2 tablets (40 mg total) by mouth daily for 5 days 10 tablet 0    albuterol (ProAir HFA) 90 mcg/act inhaler Inhale 2 puffs every 6 (six) hours as needed for shortness of breath 8 5 g 1    azithromycin (Zithromax) 250 mg tablet Take 2 tablets (500 mg total) by mouth daily for 1 day, THEN 1 tablet (250 mg total) daily for 4 days   6 tablet 0    Blood Pressure Monitoring (Comfort Touch BP Cuff/Large) MISC Use 1 each in the morning 1 each 0    conjugated estrogens (PREMARIN) vaginal cream Insert 0 5 g into the vagina 2 (two) times a week 30 g 3    ibuprofen (MOTRIN) 600 mg tablet Take 1 tablet (600 mg total) by mouth every 6 (six) hours as needed for mild pain 30 tablet 1    metFORMIN (GLUCOPHAGE) 500 mg tablet Take 1 tablet (500 mg total) by mouth 3 (three) times a day 90 tablet 5    SUMAtriptan (Imitrex) 25 mg tablet Take 1 tablet (25 mg total) by mouth once as needed for migraine for up to 1 dose 30 tablet 0    tiZANidine (ZANAFLEX) 4 mg tablet Take 1 tablet (4 mg total) by mouth every 8 (eight) hours as needed for muscle spasms 90 tablet 0    topiramate (TOPAMAX) 50 MG tablet Take 1 tablet (50 mg total) by mouth 2 (two) times a day 60 tablet 5    traZODone (DESYREL) 150 mg tablet TAKE 1 TABLET BY MOUTH AT BEDTIME 90 tablet 1     No current facility-administered medications for this visit  Allergies   Allergen Reactions    Penicillins Shortness Of Breath and Hives       Review of Systems   Constitutional: Positive for appetite change, fatigue and fever  HENT: Positive for congestion and postnasal drip  Negative for rhinorrhea, sinus pressure, sneezing and voice change  Respiratory: Positive for cough, chest tightness, shortness of breath and wheezing  Cardiovascular: Negative for chest pain, palpitations and leg swelling  Gastrointestinal: Negative for abdominal pain, diarrhea and vomiting  Genitourinary: Negative for dysuria  Neurological: Negative for dizziness and headaches  Video Exam    There were no vitals filed for this visit  Physical Exam  Constitutional:       General: She is not in acute distress  Pulmonary:      Effort: Pulmonary effort is normal  No respiratory distress  Neurological:      Mental Status: She is alert  I spent 20 minutes directly with the patient during this visit    VIRTUAL VISIT Allison Dilia 32 verbally agrees to participate in [employer]   Pt is aware that [employer] could be limited without vital signs or the ability to perform a full hands-on physical Bobby Walden understands she or the provider may request at any time to terminate the video visit and request the patient to seek care or treatment in person        Nicolette Chew MD  PGY-2 Family Medicine  06/13/22

## 2022-06-15 ENCOUNTER — TELEPHONE (OUTPATIENT)
Dept: FAMILY MEDICINE CLINIC | Facility: CLINIC | Age: 47
End: 2022-06-15

## 2022-06-15 DIAGNOSIS — J40 BRONCHITIS: Primary | ICD-10-CM

## 2022-06-15 RX ORDER — FLUTICASONE PROPIONATE AND SALMETEROL XINAFOATE 45; 21 UG/1; UG/1
2 AEROSOL, METERED RESPIRATORY (INHALATION) 2 TIMES DAILY
Qty: 12 G | Refills: 0 | Status: SHIPPED | OUTPATIENT
Start: 2022-06-15 | End: 2022-08-08

## 2022-06-15 NOTE — TELEPHONE ENCOUNTER
Insurance has denied Rx ordered  They cover Advair Diskus, Breo Ellipta, Flovent HFA, Dulera  Are you able to change to one of these?

## 2022-06-15 NOTE — TELEPHONE ENCOUNTER
Checked with pharmacy, patient has [facility] for medication  OTDDM-[phone]  Patient Id- [id number]  Prior Auth submitted

## 2022-06-15 NOTE — TELEPHONE ENCOUNTER
Patient had virtual appt with Dr Jimy Fang 6/13  She said the Budesonide-formoterol is not covered by insurance so she didn't   Can something else be prescribed?

## 2022-06-17 ENCOUNTER — TELEMEDICINE (OUTPATIENT)
Dept: FAMILY MEDICINE CLINIC | Facility: CLINIC | Age: 47
End: 2022-06-17

## 2022-06-17 VITALS — WEIGHT: 210 LBS | BODY MASS INDEX: 32.96 KG/M2 | HEIGHT: 67 IN

## 2022-06-17 DIAGNOSIS — J40 BRONCHITIS: Primary | ICD-10-CM

## 2022-06-17 PROCEDURE — G2025 DIS SITE TELE SVCS RHC/FQHC: HCPCS | Performed by: FAMILY MEDICINE

## 2022-06-17 NOTE — PROGRESS NOTES
OFFICE VISIT NOTE - Jaden Boyle 48 y o  (:1975) female MRN: [id number]  Unit/Bed#:  Encounter: [id number]    Virtual Regular Visit    Verification of patient location:    Patient is located in the following state in which I hold an active license [place]      Assessment/Plan:    Problem List Items Addressed This Visit        Respiratory    Bronchitis - Primary     Now significantly improved  - Requests letter to return back to work on Monday  - Continue prednisone course for total of 5 days  - Patient reminded to do PFT, sx possibly due to COPD in the setting of chronic tobacco use (since age 13), new onset wheezing and persistent despite conservative management prior  - continue adequate hydration and nutrition                  Reason for visit is   Chief Complaint   Patient presents with    Bronchitis Follow up     Per patient after taking the Steroids medication she feels little bit better    Cough     Per patient not as frequent as before/ patient has rib pain due to the coughing    Diarrhea    Shortness of Breath     Per patient mostly when she has to go upstairs    Fever     Per patient she feels warm at touch at night     Virtual Regular Visit        Encounter provider Christiana Vilchis MD    Provider located at [address] [place] [address]   [phone]      Recent Visits  Date Type Provider Dept   06/15/22 Telephone Nancy Villanueva, [facility]   22 Telemedicine Radha Alaniz Do [facility] 11 recent visits within past 7 days and meeting all other requirements  Today's Visits  Date Type Provider Dept   22 Telemedicine Christiana Vilchis MD  Seamus Ritchieia today's visits and meeting all other requirements  Future Appointments  No visits were found meeting these conditions    Showing future appointments within next 150 days and meeting all other requirements       The patient was identified by name and date of birth  Leigha Prakash was informed that this is a telemedicine visit and that the visit is being conducted through Telephone  My office door was closed  No one else was in the room  She acknowledged consent and understanding of privacy and security of the video platform  The patient has agreed to participate and understands they can discontinue the visit at any time  It was my intent to perform this visit via video technology but the patient was not able to do a video connection so the visit was completed via audio telephone only  Patient is aware this is a billable service  Jennifer Pelayo is a 48 y o  female following up today for bronchitis which is most likely due to COPD in the setting of chronic smoking history for several years since age 13  However, no increase in sputum production and Patient has never been formally diagnosed  Never had a PFT  Was ordered at last visit and encouraged to complete today  Patient is feeling much better since starting steroid course on 6/14  She is vaccinated, no recent travel and initially tested COVID negative at the onset of symptoms  No new symptoms today  Cough is gradually improving continues to have SOB on exertion  Wheezing has resolved        Past Medical History:   Diagnosis Date    Abnormal uterine bleeding     Back pain     Back pain with sciatica     Cancer (HCC)     cervical    Chronic pain     Chronic pain disorder     back & neck    Class 2 obesity with body mass index (BMI) of 36 0 to 36 9 in adult 6/4/2019    Endometriosis     Fibromyalgia, primary     Insomnia     Migraine        Past Surgical History:   Procedure Laterality Date    COLONOSCOPY      HYSTERECTOMY  2004    AUB    LAPAROSCOPY      adhesions    [place] INCIS TENDON SHEATH,RADIAL STYLOID Left 5/16/2019    Procedure: RELEASE DE QUERVAIN'S LEFT WRIST;  Surgeon: Aravind Aleman MD; Location:  MAIN OR;  Service: Orthopedics    [place] REVISE MEDIAN N/CARPAL TUNNEL SURG Left 5/16/2019    Procedure: RELEASE LEFT CARPAL TUNNEL;  Surgeon: Stephanie Mack MD;  Location: [address] [place] [address] MAIN OR;  Service: Orthopedics       Current Outpatient Medications   Medication Sig Dispense Refill    albuterol (ProAir HFA) 90 mcg/act inhaler Inhale 2 puffs every 6 (six) hours as needed for shortness of breath 8 5 g 1    benzonatate (TESSALON PERLES) 100 mg capsule Take 1 capsule (100 mg total) by mouth 3 (three) times a day as needed for cough 30 capsule 0    Blood Pressure Monitoring (Comfort Touch BP Cuff/Large) MISC Use 1 each in the morning 1 each 0    conjugated estrogens (PREMARIN) vaginal cream Insert 0 5 g into the vagina 2 (two) times a week 30 g 3    fluticasone (FLONASE) 50 mcg/act nasal spray 1 spray into each nostril daily 16 g 1    fluticasone-salmeterol (Advair HFA) 45-21 MCG/ACT inhaler Inhale 2 puffs 2 (two) times a day Rinse mouth after use  12 g 0    ibuprofen (MOTRIN) 600 mg tablet Take 1 tablet (600 mg total) by mouth every 6 (six) hours as needed for mild pain 30 tablet 1    metFORMIN (GLUCOPHAGE) 500 mg tablet Take 1 tablet (500 mg total) by mouth 3 (three) times a day 90 tablet 5    predniSONE 20 mg tablet Take 2 tablets (40 mg total) by mouth daily for 5 days 10 tablet 0    SUMAtriptan (Imitrex) 25 mg tablet Take 1 tablet (25 mg total) by mouth once as needed for migraine for up to 1 dose 30 tablet 0    tiZANidine (ZANAFLEX) 4 mg tablet Take 1 tablet (4 mg total) by mouth every 8 (eight) hours as needed for muscle spasms 90 tablet 0    topiramate (TOPAMAX) 50 MG tablet Take 1 tablet (50 mg total) by mouth 2 (two) times a day 60 tablet 5    traZODone (DESYREL) 150 mg tablet TAKE 1 TABLET BY MOUTH AT BEDTIME 90 tablet 1     No current facility-administered medications for this visit          Allergies   Allergen Reactions    Penicillins Shortness Of Breath and Hives       Review of Systems Constitutional: Negative for fatigue and fever  HENT: Negative for congestion  Respiratory: Positive for cough and shortness of breath (On exertion)  Negative for wheezing  Cardiovascular: Negative for chest pain  Gastrointestinal: Negative for diarrhea and vomiting  Video Exam    Vitals:    06/17/22 1044   Weight: 95 3 kg (210 lb)   Height: 5' 7" (1 702 m)       Physical Exam  Constitutional:       General: She is not in acute distress  Cardiovascular:      Heart sounds: No murmur heard  Pulmonary:      Effort: Pulmonary effort is normal  No respiratory distress  Neurological:      Mental Status: She is alert  I spent 15 minutes directly with the patient during this visit    VIRTUAL VISIT [address] [place] [address] Nw verbally agrees to participate in [employer]  Pt is aware that [employer] could be limited without vital signs or the ability to perform a full hands-on physical Renae Bui understands she or the provider may request at any time to terminate the video visit and request the patient to seek care or treatment in person      Evelyn Reina MD  PGY-2 Family Medicine  06/17/22

## 2022-06-17 NOTE — ASSESSMENT & PLAN NOTE
Now significantly improved  - Requests letter to return back to work on Monday  - Continue prednisone course for total of 5 days  - Patient reminded to do PFT, sx possibly due to COPD in the setting of chronic tobacco use (since age 13), new onset wheezing and persistent despite conservative management prior  - continue adequate hydration and nutrition

## 2022-06-17 NOTE — LETTER
June 17, 2022     Patient: Theresa Garcia  YOB: 1975  Date of Visit: 6/17/2022      To Whom it May Concern: Juany Gillespie is under my professional care  Gama Santana was seen in my office on 6/17/2022  She was treated for acute bronchitis  Terezamelly Esther may return to work on 6/20/2022  If you have any questions or concerns, please don't hesitate to call           Sincerely,          Charles Rose MD        CC: No Recipients

## 2022-07-11 DIAGNOSIS — G47.00 INSOMNIA, UNSPECIFIED TYPE: ICD-10-CM

## 2022-07-11 RX ORDER — TRAZODONE HYDROCHLORIDE 150 MG/1
150 TABLET ORAL
Qty: 90 TABLET | Refills: 1 | Status: CANCELLED | OUTPATIENT
Start: 2022-07-11

## 2022-07-12 ENCOUNTER — RA CDI HCC (OUTPATIENT)
Dept: OTHER | Facility: HOSPITAL | Age: 47
End: 2022-07-12

## 2022-07-12 NOTE — PROGRESS NOTES
[address]  coding opportunities       Chart reviewed, no opportunity found: CHART REVIEWED, NO OPPORTUNITY FOUND        Patients Insurance     Medicare Insurance: Medicare

## 2022-07-15 ENCOUNTER — TELEPHONE (OUTPATIENT)
Dept: FAMILY MEDICINE CLINIC | Facility: CLINIC | Age: 47
End: 2022-07-15

## 2022-08-08 ENCOUNTER — OFFICE VISIT (OUTPATIENT)
Dept: FAMILY MEDICINE CLINIC | Facility: CLINIC | Age: 47
End: 2022-08-08

## 2022-08-08 VITALS
HEIGHT: 67 IN | SYSTOLIC BLOOD PRESSURE: 116 MMHG | BODY MASS INDEX: 32.21 KG/M2 | WEIGHT: 205.2 LBS | DIASTOLIC BLOOD PRESSURE: 90 MMHG | HEART RATE: 100 BPM | OXYGEN SATURATION: 95 % | TEMPERATURE: 97.3 F | RESPIRATION RATE: 20 BRPM

## 2022-08-08 DIAGNOSIS — J40 BRONCHITIS: Primary | ICD-10-CM

## 2022-08-08 DIAGNOSIS — Z82.5: ICD-10-CM

## 2022-08-08 DIAGNOSIS — Z72.0 TOBACCO ABUSE: ICD-10-CM

## 2022-08-08 PROBLEM — F17.200 TOBACCO DEPENDENCE: Status: RESOLVED | Noted: 2021-06-11 | Resolved: 2022-08-08

## 2022-08-08 PROCEDURE — 99213 OFFICE O/P EST LOW 20 MIN: CPT | Performed by: FAMILY MEDICINE

## 2022-08-08 RX ORDER — BENZONATATE 100 MG/1
100 CAPSULE ORAL 3 TIMES DAILY PRN
Qty: 30 CAPSULE | Refills: 0 | Status: SHIPPED | OUTPATIENT
Start: 2022-08-08

## 2022-08-08 RX ORDER — GUAIFENESIN 600 MG/1
1200 TABLET, EXTENDED RELEASE ORAL EVERY 12 HOURS SCHEDULED
Qty: 120 TABLET | Refills: 0 | Status: SHIPPED | OUTPATIENT
Start: 2022-08-08 | End: 2022-09-01 | Stop reason: SDUPTHER

## 2022-08-08 RX ORDER — BUDESONIDE AND FORMOTEROL FUMARATE DIHYDRATE 80; 4.5 UG/1; UG/1
2 AEROSOL RESPIRATORY (INHALATION) 2 TIMES DAILY
Qty: 10.2 G | Refills: 1 | Status: CANCELLED | OUTPATIENT
Start: 2022-08-08

## 2022-08-08 RX ORDER — TIOTROPIUM BROMIDE INHALATION SPRAY 1.56 UG/1
2 SPRAY, METERED RESPIRATORY (INHALATION) DAILY
Qty: 12 G | Refills: 3 | Status: SHIPPED | OUTPATIENT
Start: 2022-08-08

## 2022-08-08 NOTE — ASSESSMENT & PLAN NOTE
- smoking since age 13  - currently smoking 1 ppd  - previously tried to quit - was down to 3 cigs daily after use of chantix but use increased after medication recalled  - interested in working towards cessation, referred to tobacco cessation program today  - reports that she cannot use wellbutrin because it causes manic episodes    Tobacco Cessation Counseling: Tobacco cessation counseling and education was provided  The patient is sincerely urged to quit consumption of tobacco  She is ready to quit tobacco  The numerous health risks of tobacco consumption were discussed  Patient was provided a referral for tobacco cessation management

## 2022-08-08 NOTE — PROGRESS NOTES
Assessment/Plan:    Bronchitis  This appears to be becoming a chronic issue  She has had at least 4 episodes of bronchitis within the last 4 months  FHx significant for COPD in mother   - tobacco abuse since age 13, currently smoking about 1 ppd - interested in cessation; suspect underlying COPD in setting of smoking hx  - PFTs ordered, pt instructed to call central scheduling to set up appt  - rx spiriva 2 puffs daily  - rx mucinex 600 mg BID  - rx tessalon perles for cough  - recommend supportive care  - will hold off abx and steroid tx at this time due to minimal sxs  - return precautions reviewed  - will schedule f/u if sxs worsen or after PFT testing complete      Tobacco abuse  - smoking since age 13  - currently smoking 1 ppd  - previously tried to quit - was down to 3 cigs daily after use of chantix but use increased after medication recalled  - interested in working towards cessation, referred to tobacco cessation program today  - reports that she cannot use wellbutrin because it causes manic episodes    Tobacco Cessation Counseling: Tobacco cessation counseling and education was provided  The patient is sincerely urged to quit consumption of tobacco  She is ready to quit tobacco  The numerous health risks of tobacco consumption were discussed  Patient was provided a referral for tobacco cessation management  Diagnoses and all orders for this visit:    Bronchitis  -     Complete PFT with post bronchodilator; Future  -     benzonatate (TESSALON PERLES) 100 mg capsule; Take 1 capsule (100 mg total) by mouth 3 (three) times a day as needed for cough  -     tiotropium (Spiriva Respimat) 1 25 MCG/ACT AERS inhaler; Inhale 2 puffs daily  -     guaiFENesin (MUCINEX) 600 mg 12 hr tablet; Take 2 tablets (1,200 mg total) by mouth every 12 (twelve) hours    FHx: COPD (chronic obstructive pulmonary disease)  -     Complete PFT with post bronchodilator;  Future  -     tiotropium (Spiriva Respimat) 1 25 MCG/ACT AERS inhaler; Inhale 2 puffs daily    Tobacco abuse  -     Ambulatory Referral to Smoking Cessation Program; Future  -     Complete PFT with post bronchodilator; Future  -     tiotropium (Spiriva Respimat) 1 25 MCG/ACT AERS inhaler; Inhale 2 puffs daily          Subjective:      Patient ID: Gonzalez Mcnulty is a 48 y o  female  49 y/o F presents for evaluation of possible bronchitis  Pt complains of some productive cough that is worse overnight, rib pain from coughing, subjective fevers  Denies any recent sick contacts  Occasionally been using albuterol that was previously prescribed for overnight sxs  Denies CP, SOB, headache, abd pain, nausea, vomiting  Per chart review, she has had about 4 episodes of bronchitis in the last 4 months requiring course with azithromycin and prednisone  She's here today to evaluated before she gets any worse  Pt has been smoking since she was 13 y/o - currently smoking about 1 ppd  She has not yet completed her PFT testing  She is interesting in tobacco cessation  Previously tried with chantix but stopped when it was recalled  Suspect that patient has some COPD considering her long smoking history  Reports mother had a hx of COPD  The following portions of the patient's history were reviewed and updated as appropriate: allergies, current medications, past family history, past medical history, past social history, past surgical history and problem list     Review of Systems   Constitutional: Negative for chills and fever  HENT: Positive for congestion  Negative for postnasal drip, sinus pressure, sinus pain, sore throat and trouble swallowing  Respiratory: Positive for cough  Negative for shortness of breath  Cardiovascular: Negative for chest pain and palpitations  Gastrointestinal: Negative for abdominal pain, constipation, diarrhea, nausea and vomiting     Musculoskeletal:        Diaphragm pain 2/2 to coughing    Neurological: Negative for dizziness, light-headedness and headaches  Objective:      /90 (BP Location: Left arm, Patient Position: Sitting, Cuff Size: Large)   Pulse 100   Temp (!) 97 3 °F (36 3 °C) (Temporal)   Resp 20   Ht 5' 7" (1 702 m)   Wt 93 1 kg (205 lb 3 2 oz)   LMP 01/05/2005   SpO2 95%   BMI 32 14 kg/m²          Physical Exam  Vitals reviewed  Constitutional:       General: She is not in acute distress  Appearance: Normal appearance  HENT:      Head: Normocephalic and atraumatic  Right Ear: External ear normal       Left Ear: External ear normal       Nose: Nose normal       Mouth/Throat:      Pharynx: Oropharynx is clear  Eyes:      Extraocular Movements: Extraocular movements intact  Conjunctiva/sclera: Conjunctivae normal    Cardiovascular:      Rate and Rhythm: Normal rate and regular rhythm  Pulses: Normal pulses  Heart sounds: Normal heart sounds  Pulmonary:      Effort: Pulmonary effort is normal  No respiratory distress  Breath sounds: Normal breath sounds  No wheezing or rhonchi  Chest:      Chest wall: No tenderness  Abdominal:      Palpations: Abdomen is soft  Musculoskeletal:      Cervical back: Neck supple  Skin:     General: Skin is warm  Capillary Refill: Capillary refill takes less than 2 seconds  Neurological:      Mental Status: She is alert and oriented to person, place, and time  Gait: Gait normal    Psychiatric:         Mood and Affect: Mood normal          Behavior: Behavior normal          Thought Content:  Thought content normal          Judgment: Judgment normal

## 2022-08-08 NOTE — ASSESSMENT & PLAN NOTE
This appears to be becoming a chronic issue  She has had at least 4 episodes of bronchitis within the last 4 months   FHx significant for COPD in mother   - tobacco abuse since age 13, currently smoking about 1 ppd - interested in cessation; suspect underlying COPD in setting of smoking hx  - PFTs ordered, pt instructed to call central scheduling to set up appt  - rx spiriva 2 puffs daily  - rx mucinex 600 mg BID  - rx tessalon perles for cough  - recommend supportive care  - will hold off abx and steroid tx at this time due to minimal sxs  - return precautions reviewed  - will schedule f/u if sxs worsen or after PFT testing complete

## 2022-08-29 ENCOUNTER — TELEMEDICINE (OUTPATIENT)
Dept: PULMONOLOGY | Facility: CLINIC | Age: 47
End: 2022-08-29
Payer: MEDICARE

## 2022-08-29 DIAGNOSIS — T65.294A TOXIC EFFECT OF TOBACCO, UNDETERMINED INTENT, INITIAL ENCOUNTER: Primary | ICD-10-CM

## 2022-08-29 PROBLEM — T65.291A TOXIC EFFECT OF TOBACCO: Status: ACTIVE | Noted: 2022-08-29

## 2022-08-29 PROCEDURE — 99442 PR PHYS/QHP TELEPHONE EVALUATION 11-20 MIN: CPT | Performed by: PHYSICIAN ASSISTANT

## 2022-08-29 RX ORDER — POLYETHYLENE GLYCOL 3350 17 G
4 POWDER IN PACKET (EA) ORAL AS NEEDED
Qty: 100 EACH | Refills: 2 | Status: SHIPPED | OUTPATIENT
Start: 2022-08-29

## 2022-08-29 RX ORDER — VARENICLINE TARTRATE 1 MG/1
1 TABLET, FILM COATED ORAL 2 TIMES DAILY
Qty: 60 TABLET | Refills: 2 | Status: SHIPPED | OUTPATIENT
Start: 2022-08-29 | End: 2022-10-21

## 2022-08-29 RX ORDER — NICOTINE 21 MG/24HR
1 PATCH, TRANSDERMAL 24 HOURS TRANSDERMAL EVERY 24 HOURS
Qty: 28 PATCH | Refills: 2 | Status: SHIPPED | OUTPATIENT
Start: 2022-08-29 | End: 2022-09-30 | Stop reason: SDUPTHER

## 2022-08-29 NOTE — ASSESSMENT & PLAN NOTE
Discussed smoking cessation for approximately 15 minutes today via virtual visit  Patient is very motivated to quit smoking  Would consider her contemplative but ready to transition to action state of mind  Reviewed pharmacotherapies in detail including Chantix, Wellbutrin nicotine replacement therapies  At the end of discussion we have decided to start generic varenicline 1 mg po BID  Reviewed side effects  Patient denies side effects when using Chantix previously  Will also use nicotine replacement patches 21 mg transdermally Q 24 hours  Also prescribed, nicotine replacement lozenges 4 mg p o  Q 1-2 hours p r n  For breakthrough cravings  Also reviewed behavioral modifications in detail with special attention spent on pattern recognition, stress relievers, minimizing stressors, and setting attainable goals  At the end of our discussion patient is confident being able to be less than 1 pack per day by the next time she sees me  Will have her follow-up in 6 weeks

## 2022-08-29 NOTE — PROGRESS NOTES
Virtual Visit with Tobacco Cessation    Verification of patient location:    Patient is located in the following state in which I hold an active license [place]    Problem List Items Addressed This Visit        Other    Toxic effect of tobacco - Primary     Discussed smoking cessation for approximately 15 minutes today via virtual visit  Patient is very motivated to quit smoking  Would consider her contemplative but ready to transition to action state of mind  Reviewed pharmacotherapies in detail including Chantix, Wellbutrin nicotine replacement therapies  At the end of discussion we have decided to start generic varenicline 1 mg po BID  Reviewed side effects  Patient denies side effects when using Chantix previously  Will also use nicotine replacement patches 21 mg transdermally Q 24 hours  Also prescribed, nicotine replacement lozenges 4 mg p o  Q 1-2 hours p r n  For breakthrough cravings  Also reviewed behavioral modifications in detail with special attention spent on pattern recognition, stress relievers, minimizing stressors, and setting attainable goals  At the end of our discussion patient is confident being able to be less than 1 pack per day by the next time she sees me  Will have her follow-up in 6 weeks  Relevant Medications    varenicline (APO-Varenicline) 1 mg tablet    nicotine (NICODERM CQ) 21 mg/24 hr TD 24 hr patch    nicotine polacrilex (COMMIT) 4 MG lozenge          Reason for visit is smoking cessation consult    Encounter provider Cristofer Chaparro PA-C    Provider located at [address] [place] [id number] [phone]      Recent Visits  No visits were found meeting these conditions    Showing recent visits within past 7 days and meeting all other requirements  Today's Visits  Date Type Provider Dept   08/29/22 Telemedicine Luigi Shaffer PA-C [facility]   Showing today's visits and meeting all other requirements  Future Appointments  No visits were found meeting these conditions  Showing future appointments within next 150 days and meeting all other requirements       The patient was identified by name and date of birth  Corrine Reyes was informed that this is a telemedicine visit and that the visit is being conducted throughTelephone  My office door was closed  No one else was in the room  She acknowledged consent and understanding of privacy and security of the platform  Corrine Reyes verbally agrees to participate in [employer]  Pt is aware that [employer] could be limited without vital signs or the ability to perform a full hands-on physical Bobbi Sim understands she or the provider may request at any time to terminate the video visit and request the patient to seek care or treatment in person  Patient is aware this is a billable service  Inessa Rodriguez is a 48 y o  female     HPI   Started when 10 years  Smoked for 36 years about a 1 ppd  A few months was down to 3 cigs while on chantix BID  Also tried wellbutrin in the past but it made her manic  She has bipolar disorder  Also tried patches, commit, gum  Never tried combinations  Want to quit because of her family  She wants to spend time with grandchildren and play with them  Also wants to quit to improve her health  She has breathing issues and is planning to have PFTs soon  Lives with  along with daughter and grandchildren   and daughter smoke too  Only smokes in their bedroom, living room, and outside  Also smokes in the car  Usually smokes first cigarette within 30 min  Smokes first one with morning coffee  Smokes because she's bored and also smokes to curb appetite  Smokes more when stressed  Worried about weight gain when she quits  Eats healthy now and doesn't like junk food       Past Medical History:   Diagnosis Date    Abnormal uterine bleeding     Back pain     Back pain with sciatica     Cancer (HCC)     cervical    Chronic pain     Chronic pain disorder     back & neck    Class 2 obesity with body mass index (BMI) of 36 0 to 36 9 in adult 6/4/2019    Endometriosis     Fibromyalgia, primary     Insomnia     Migraine        Past Surgical History:   Procedure Laterality Date    COLONOSCOPY      HYSTERECTOMY  2004    AUB    LAPAROSCOPY      adhesions    OR INCIS TENDON SHEATH,RADIAL STYLOID Left 5/16/2019    Procedure: RELEASE DE QUERVAIN'S LEFT WRIST;  Surgeon: Alex Paul MD;  Location: [facility] MAIN OR;  Service: Orthopedics    OR REVISE MEDIAN N/CARPAL TUNNEL SURG Left 5/16/2019    Procedure: RELEASE LEFT CARPAL TUNNEL;  Surgeon: Alex Paul MD;  Location: [facility] MAIN OR;  Service: Orthopedics       Social History     Tobacco Use   Smoking Status Current Every Day Smoker    Packs/day: 0 50    Types: Cigarettes   Smokeless Tobacco Never Used       E-Cigarette/Vaping    E-Cigarette Use Never User      E-Cigarette/Vaping Substances    Nicotine No     THC No     CBD No     Flavoring No     Other No     Unknown No        Current Outpatient Medications   Medication Sig Dispense Refill    nicotine (NICODERM CQ) 21 mg/24 hr TD 24 hr patch Place 1 patch on the skin every 24 hours 28 patch 2    nicotine polacrilex (COMMIT) 4 MG lozenge Apply 1 lozenge (4 mg total) to the mouth or throat as needed for smoking cessation 100 each 2    varenicline (APO-Varenicline) 1 mg tablet Take 1 tablet (1 mg total) by mouth 2 (two) times a day 60 tablet 2    albuterol (ProAir HFA) 90 mcg/act inhaler Inhale 2 puffs every 6 (six) hours as needed for shortness of breath 8 5 g 1    benzonatate (TESSALON PERLES) 100 mg capsule Take 1 capsule (100 mg total) by mouth 3 (three) times a day as needed for cough 30 capsule 0    benzonatate (TESSALON PERLES) 100 mg capsule Take 1 capsule (100 mg total) by mouth 3 (three) times a day as needed for cough 30 capsule 0    Blood Pressure Monitoring (Comfort Touch BP Cuff/Large) MISC Use 1 each in the morning 1 each 0    conjugated estrogens (PREMARIN) vaginal cream Insert 0 5 g into the vagina 2 (two) times a week 30 g 3    fluticasone (FLONASE) 50 mcg/act nasal spray 1 spray into each nostril daily 16 g 1    guaiFENesin (MUCINEX) 600 mg 12 hr tablet Take 2 tablets (1,200 mg total) by mouth every 12 (twelve) hours 120 tablet 0    ibuprofen (MOTRIN) 600 mg tablet Take 1 tablet (600 mg total) by mouth every 6 (six) hours as needed for mild pain 30 tablet 1    metFORMIN (GLUCOPHAGE) 500 mg tablet Take 1 tablet (500 mg total) by mouth 3 (three) times a day 90 tablet 5    SUMAtriptan (Imitrex) 25 mg tablet Take 1 tablet (25 mg total) by mouth once as needed for migraine for up to 1 dose 30 tablet 0    tiotropium (Spiriva Respimat) 1 25 MCG/ACT AERS inhaler Inhale 2 puffs daily 12 g 3    tiZANidine (ZANAFLEX) 4 mg tablet Take 1 tablet (4 mg total) by mouth every 8 (eight) hours as needed for muscle spasms 90 tablet 0    topiramate (TOPAMAX) 50 MG tablet Take 1 tablet (50 mg total) by mouth 2 (two) times a day 60 tablet 5    traZODone (DESYREL) 150 mg tablet TAKE 1 TABLET BY MOUTH AT BEDTIME 90 tablet 1     No current facility-administered medications for this visit  Allergies   Allergen Reactions    Penicillins Shortness Of Breath and Hives       Review of Systems   All other systems reviewed and are negative  Video Exam    There were no vitals filed for this visit  Physical Exam   It was my intent to perform this visit via video technology but the patient was not able to do a video connection so the visit was completed via audio telephone only  Tobacco Use/Cessation  I assessed Kerri Byrd to be in a contemplative stage with respect to tobacco use  I advised patient to quit, and offered support  Discussed current use pattern    Asked patient to inform me when they set a quit date   Nicotine patches beginning at 21 mg   I spent approximately 15 minutes counseling the patient  Hood Morelos is a 48 y o  female here for a discussion regarding smoking cessation  She began smoking several years ago  She currently smokes 1 packs per day  She has attempted to quit smoking in the past, most recently a few months ago  Best success quitting using chantix  Barriers to quitting include: spouse/partner smokes  She denies chest pain, dyspnea on exertion, dyspnea while laying down, hemoptysis, non productive cough, productive cough, shortness of breath and wheezing  As a result of this visit, I have referred the patient for further respiratory evaluation   No    I spent 20 minutes with patient today in which >10 minutes of the time was spent in counseling/coordination of care regarding tobacco cessation

## 2022-09-01 DIAGNOSIS — J40 BRONCHITIS: ICD-10-CM

## 2022-09-01 DIAGNOSIS — M54.40 CHRONIC MIDLINE LOW BACK PAIN WITH SCIATICA, SCIATICA LATERALITY UNSPECIFIED: ICD-10-CM

## 2022-09-01 DIAGNOSIS — G89.29 CHRONIC MIDLINE LOW BACK PAIN WITH SCIATICA, SCIATICA LATERALITY UNSPECIFIED: ICD-10-CM

## 2022-09-02 NOTE — TELEPHONE ENCOUNTER
Please advise if refills are appropriate for patient  After chart review, patient has been seen several times for bronchitis, but provider is unsure if this is a chronic issue  Unsure if patient needs to follow-up again or if medication refill can be provided for this  Thanks!

## 2022-09-07 RX ORDER — GUAIFENESIN 600 MG/1
1200 TABLET, EXTENDED RELEASE ORAL EVERY 12 HOURS SCHEDULED
Qty: 120 TABLET | Refills: 0 | Status: SHIPPED | OUTPATIENT
Start: 2022-09-07 | End: 2022-10-07

## 2022-09-07 RX ORDER — TIZANIDINE 4 MG/1
4 TABLET ORAL EVERY 8 HOURS PRN
Qty: 90 TABLET | Refills: 0 | Status: SHIPPED | OUTPATIENT
Start: 2022-09-07 | End: 2022-09-30 | Stop reason: SDUPTHER

## 2022-09-19 ENCOUNTER — HOSPITAL ENCOUNTER (OUTPATIENT)
Dept: PULMONOLOGY | Facility: HOSPITAL | Age: 47
Discharge: HOME | End: 2022-09-19
Payer: MEDICARE

## 2022-09-19 DIAGNOSIS — Z82.5: ICD-10-CM

## 2022-09-19 DIAGNOSIS — J40 BRONCHITIS: ICD-10-CM

## 2022-09-19 DIAGNOSIS — Z72.0 TOBACCO ABUSE: ICD-10-CM

## 2022-09-19 PROCEDURE — 94729 DIFFUSING CAPACITY: CPT | Performed by: INTERNAL MEDICINE

## 2022-09-19 PROCEDURE — 94726 PLETHYSMOGRAPHY LUNG VOLUMES: CPT

## 2022-09-19 PROCEDURE — 94726 PLETHYSMOGRAPHY LUNG VOLUMES: CPT | Performed by: INTERNAL MEDICINE

## 2022-09-19 PROCEDURE — 94729 DIFFUSING CAPACITY: CPT

## 2022-09-19 PROCEDURE — 94760 N-INVAS EAR/PLS OXIMETRY 1: CPT

## 2022-09-19 PROCEDURE — 94060 EVALUATION OF WHEEZING: CPT | Performed by: INTERNAL MEDICINE

## 2022-09-19 PROCEDURE — 94060 EVALUATION OF WHEEZING: CPT

## 2022-09-19 RX ORDER — ALBUTEROL SULFATE 2.5 MG/3ML
2.5 SOLUTION RESPIRATORY (INHALATION) ONCE
Status: COMPLETED | OUTPATIENT
Start: 2022-09-19 | End: 2022-09-19

## 2022-09-19 RX ADMIN — ALBUTEROL SULFATE 2.5 MG: 2.5 SOLUTION RESPIRATORY (INHALATION) at 16:55

## 2022-09-30 ENCOUNTER — OFFICE VISIT (OUTPATIENT)
Dept: FAMILY MEDICINE CLINIC | Facility: CLINIC | Age: 47
End: 2022-09-30

## 2022-09-30 VITALS
BODY MASS INDEX: 32.96 KG/M2 | OXYGEN SATURATION: 98 % | SYSTOLIC BLOOD PRESSURE: 142 MMHG | HEART RATE: 86 BPM | WEIGHT: 210 LBS | TEMPERATURE: 97.6 F | DIASTOLIC BLOOD PRESSURE: 83 MMHG | RESPIRATION RATE: 18 BRPM | HEIGHT: 67 IN

## 2022-09-30 DIAGNOSIS — M54.9 BACK PAIN, UNSPECIFIED BACK LOCATION, UNSPECIFIED BACK PAIN LATERALITY, UNSPECIFIED CHRONICITY: ICD-10-CM

## 2022-09-30 DIAGNOSIS — T65.294A TOXIC EFFECT OF TOBACCO, UNDETERMINED INTENT, INITIAL ENCOUNTER: ICD-10-CM

## 2022-09-30 DIAGNOSIS — E66.9 CLASS 2 OBESITY WITH BODY MASS INDEX (BMI) OF 36.0 TO 36.9 IN ADULT, UNSPECIFIED OBESITY TYPE, UNSPECIFIED WHETHER SERIOUS COMORBIDITY PRESENT: ICD-10-CM

## 2022-09-30 DIAGNOSIS — G43.909 MIGRAINE WITHOUT STATUS MIGRAINOSUS, NOT INTRACTABLE, UNSPECIFIED MIGRAINE TYPE: ICD-10-CM

## 2022-09-30 DIAGNOSIS — Z72.0 TOBACCO ABUSE: Primary | ICD-10-CM

## 2022-09-30 DIAGNOSIS — M54.40 CHRONIC MIDLINE LOW BACK PAIN WITH SCIATICA, SCIATICA LATERALITY UNSPECIFIED: ICD-10-CM

## 2022-09-30 DIAGNOSIS — G89.29 CHRONIC MIDLINE LOW BACK PAIN WITH SCIATICA, SCIATICA LATERALITY UNSPECIFIED: ICD-10-CM

## 2022-09-30 DIAGNOSIS — J40 BRONCHITIS: ICD-10-CM

## 2022-09-30 PROBLEM — R35.0 FREQUENCY OF URINATION: Status: RESOLVED | Noted: 2020-11-03 | Resolved: 2022-09-30

## 2022-09-30 PROBLEM — Z71.6 ENCOUNTER FOR SMOKING CESSATION COUNSELING: Status: RESOLVED | Noted: 2019-06-17 | Resolved: 2022-09-30

## 2022-09-30 PROCEDURE — 99213 OFFICE O/P EST LOW 20 MIN: CPT | Performed by: FAMILY MEDICINE

## 2022-09-30 RX ORDER — NICOTINE 21 MG/24HR
1 PATCH, TRANSDERMAL 24 HOURS TRANSDERMAL EVERY 24 HOURS
Qty: 28 PATCH | Refills: 2 | Status: SHIPPED | OUTPATIENT
Start: 2022-09-30 | End: 2022-10-07 | Stop reason: SDUPTHER

## 2022-09-30 RX ORDER — ALBUTEROL SULFATE 90 UG/1
2 AEROSOL, METERED RESPIRATORY (INHALATION) EVERY 6 HOURS PRN
Qty: 8.5 G | Refills: 1 | Status: SHIPPED | OUTPATIENT
Start: 2022-09-30

## 2022-09-30 RX ORDER — TIZANIDINE 4 MG/1
4 TABLET ORAL EVERY 8 HOURS PRN
Qty: 60 TABLET | Refills: 0 | Status: SHIPPED | OUTPATIENT
Start: 2022-09-30 | End: 2022-10-07

## 2022-09-30 RX ORDER — RIZATRIPTAN BENZOATE 5 MG/1
5 TABLET, ORALLY DISINTEGRATING ORAL AS NEEDED
Qty: 9 TABLET | Refills: 0 | Status: SHIPPED | OUTPATIENT
Start: 2022-09-30

## 2022-09-30 NOTE — ASSESSMENT & PLAN NOTE
- worsening, increased stress but no obvious triggers  - reports trying maxalt before with good relief  - reports no benefit and uncomfortable feeling with imitrex  - recommend f/u for OMT   - encourage smoking cessation

## 2022-09-30 NOTE — ASSESSMENT & PLAN NOTE
- refer to PT, aquatic therapy  - pt also takes tizanidine 4 mg prn, about 2-3x weekly and only at night since it can be sedating for her - refilled today

## 2022-09-30 NOTE — PROGRESS NOTES
Name: Stew Aguirre      : 1975      MRN: [id number]  Encounter Provider: Issa Martinez DO  Encounter Date: 2022   Encounter department: [address] [address]  Tobacco abuse  Assessment & Plan:  - smoking since age 13  - currently smoking 1 ppd, tries to cut back but reports difficulty even with assistance from tobacco cessation program  - reports difficulty getting medications like the patch or gum approved by insurance  - previously tried to quit - was down to 3 cigs daily after use of chantix but use increased after medication recalled  - reports that she cannot use wellbutrin because it causes manic episodes  - continue to follow with cessation program  - reordered nicotine patch, asked patient to tell pharmacy to reach out if prior auth is needed    Tobacco Cessation Counseling: Tobacco cessation counseling and education was provided  The patient is sincerely urged to quit consumption of tobacco  She is ready to quit tobacco  The numerous health risks of tobacco consumption were discussed  Patient was provided a referral for tobacco cessation management  Rx nicotine patch  2  Toxic effect of tobacco, undetermined intent, initial encounter  -     nicotine (NICODERM CQ) 21 mg/24 hr TD 24 hr patch; Place 1 patch on the skin every 24 hours    3  Chronic midline low back pain with sciatica, sciatica laterality unspecified  Assessment & Plan:  - refer to PT, aquatic therapy  - pt also takes tizanidine 4 mg prn, about 2-3x weekly and only at night since it can be sedating for her - refilled today    Orders:  -     tiZANidine (ZANAFLEX) 4 mg tablet; Take 1 tablet (4 mg total) by mouth every 8 (eight) hours as needed for muscle spasms    4  Bronchitis  -     albuterol (ProAir HFA) 90 mcg/act inhaler; Inhale 2 puffs every 6 (six) hours as needed for wheezing    5   Class 2 obesity with body mass index (BMI) of 36 0 to 36 9 in adult, unspecified obesity type, unspecified whether serious comorbidity present  Assessment & Plan:  - likely secondary to excess calories, dietary modification and exercise again discussed in detail  - referred to weight management, encouraged patient to speak again about resources    Orders:  -     Ambulatory Referral to Weight Management; Future    6  Back pain, unspecified back location, unspecified back pain laterality, unspecified chronicity  -     Ambulatory Referral to Physical Therapy; Future    7  Migraine without status migrainosus, not intractable, unspecified migraine type  Assessment & Plan:  - worsening, increased stress but no obvious triggers  - reports trying maxalt before with good relief  - reports no benefit and uncomfortable feeling with imitrex  - recommend f/u for OMT   - encourage smoking cessation    Orders:  -     rizatriptan (Maxalt-MLT) 5 mg disintegrating tablet; Take 1 tablet (5 mg total) by mouth as needed for migraine Take at the onset of migraine; if symptoms continue or return, may take another dose at least 2 hours after first dose  Take no more than 2 doses in a day  Subjective      49 y/o F presents for follow up appointment  She is following with tobacco cessation program for treatment, but continues to smoke about 1 ppd a day - can fluctuate based on day but this is her average  Also reports taking Varenicline or similar medication - per chart review, APO-Varenicline 1 mg BID  She also uses nicotine gum she purchased OTC but reports that it is expensive and she cannot always do this  She has had increasing difficulty getting her medications because of insurance issues  Also notes worsening chronic back pain and is worried about her weight  Has gone to weight management, did not think it was encouraging to her so she did not follow up  Migraines worsening, occurring 2-3x weekly, due to family stress  No auras  No improvement with OTC meds   Reports that she takes maxalt for this but ran out  She did not tolerate sumatriptan - made her feel uncomfortable and she did not take it  Review of Systems   Eyes: Negative for visual disturbance  Respiratory: Negative for cough and shortness of breath  Cardiovascular: Negative for chest pain and palpitations  Gastrointestinal: Negative for abdominal pain, constipation, diarrhea, nausea and vomiting  Musculoskeletal: Positive for back pain  Negative for arthralgias  Neurological: Negative for dizziness, light-headedness and headaches         Current Outpatient Medications on File Prior to Visit   Medication Sig    conjugated estrogens (PREMARIN) vaginal cream Insert 0 5 g into the vagina 2 (two) times a week    fluticasone (FLONASE) 50 mcg/act nasal spray 1 spray into each nostril daily    ibuprofen (MOTRIN) 600 mg tablet Take 1 tablet (600 mg total) by mouth every 6 (six) hours as needed for mild pain    topiramate (TOPAMAX) 50 MG tablet Take 1 tablet (50 mg total) by mouth 2 (two) times a day    traZODone (DESYREL) 150 mg tablet TAKE 1 TABLET BY MOUTH AT BEDTIME    varenicline (APO-Varenicline) 1 mg tablet Take 1 tablet (1 mg total) by mouth 2 (two) times a day    [DISCONTINUED] tiZANidine (ZANAFLEX) 4 mg tablet Take 1 tablet (4 mg total) by mouth every 8 (eight) hours as needed for muscle spasms    albuterol (ProAir HFA) 90 mcg/act inhaler Inhale 2 puffs every 6 (six) hours as needed for shortness of breath    benzonatate (TESSALON PERLES) 100 mg capsule Take 1 capsule (100 mg total) by mouth 3 (three) times a day as needed for cough    benzonatate (TESSALON PERLES) 100 mg capsule Take 1 capsule (100 mg total) by mouth 3 (three) times a day as needed for cough    Blood Pressure Monitoring (Comfort Touch BP Cuff/Large) MISC Use 1 each in the morning    guaiFENesin (MUCINEX) 600 mg 12 hr tablet Take 2 tablets (1,200 mg total) by mouth every 12 (twelve) hours    metFORMIN (GLUCOPHAGE) 500 mg tablet Take 1 tablet (500 mg total) by mouth 3 (three) times a day    nicotine polacrilex (COMMIT) 4 MG lozenge Apply 1 lozenge (4 mg total) to the mouth or throat as needed for smoking cessation    SUMAtriptan (Imitrex) 25 mg tablet Take 1 tablet (25 mg total) by mouth once as needed for migraine for up to 1 dose    tiotropium (Spiriva Respimat) 1 25 MCG/ACT AERS inhaler Inhale 2 puffs daily    [DISCONTINUED] nicotine (NICODERM CQ) 21 mg/24 hr TD 24 hr patch Place 1 patch on the skin every 24 hours       Objective     /83   Pulse 86   Temp 97 6 °F (36 4 °C)   Resp 18   Ht 5' 7" (1 702 m)   Wt 95 3 kg (210 lb)   LMP 01/05/2005   SpO2 98%   BMI 32 89 kg/m²     Physical Exam  Vitals reviewed  Constitutional:       General: She is not in acute distress  Appearance: Normal appearance  HENT:      Head: Normocephalic and atraumatic  Right Ear: External ear normal       Left Ear: External ear normal       Nose: Nose normal       Mouth/Throat:      Pharynx: Oropharynx is clear  Eyes:      Conjunctiva/sclera: Conjunctivae normal    Cardiovascular:      Rate and Rhythm: Normal rate and regular rhythm  Pulses: Normal pulses  Heart sounds: Normal heart sounds  Pulmonary:      Effort: Pulmonary effort is normal       Breath sounds: Normal breath sounds  Abdominal:      Palpations: Abdomen is soft  Musculoskeletal:      Cervical back: Neck supple  Right lower leg: No edema  Left lower leg: No edema  Skin:     General: Skin is warm  Capillary Refill: Capillary refill takes less than 2 seconds  Neurological:      Mental Status: She is alert and oriented to person, place, and time  Gait: Gait normal    Psychiatric:         Mood and Affect: Mood normal          Behavior: Behavior normal          Thought Content:  Thought content normal          Judgment: Judgment normal        Yodit Vee DO

## 2022-09-30 NOTE — ASSESSMENT & PLAN NOTE
- smoking since age 13  - currently smoking 1 ppd, tries to cut back but reports difficulty even with assistance from tobacco cessation program  - reports difficulty getting medications like the patch or gum approved by insurance  - previously tried to quit - was down to 3 cigs daily after use of chantix but use increased after medication recalled  - reports that she cannot use wellbutrin because it causes manic episodes  - continue to follow with cessation program  - reordered nicotine patch, asked patient to tell pharmacy to reach out if prior auth is needed    Tobacco Cessation Counseling: Tobacco cessation counseling and education was provided  The patient is sincerely urged to quit consumption of tobacco  She is ready to quit tobacco  The numerous health risks of tobacco consumption were discussed  Patient was provided a referral for tobacco cessation management  Rx nicotine patch

## 2022-09-30 NOTE — ASSESSMENT & PLAN NOTE
- likely secondary to excess calories, dietary modification and exercise again discussed in detail  - referred to weight management, encouraged patient to speak again about resources

## 2022-10-07 ENCOUNTER — OFFICE VISIT (OUTPATIENT)
Dept: FAMILY MEDICINE CLINIC | Facility: CLINIC | Age: 47
End: 2022-10-07

## 2022-10-07 VITALS — WEIGHT: 209.2 LBS | BODY MASS INDEX: 32.77 KG/M2

## 2022-10-07 DIAGNOSIS — M54.40 CHRONIC MIDLINE LOW BACK PAIN WITH SCIATICA, SCIATICA LATERALITY UNSPECIFIED: ICD-10-CM

## 2022-10-07 DIAGNOSIS — G89.29 CHRONIC MIDLINE LOW BACK PAIN WITH SCIATICA, SCIATICA LATERALITY UNSPECIFIED: ICD-10-CM

## 2022-10-07 DIAGNOSIS — T65.294A TOXIC EFFECT OF TOBACCO, UNDETERMINED INTENT, INITIAL ENCOUNTER: ICD-10-CM

## 2022-10-07 PROCEDURE — 99213 OFFICE O/P EST LOW 20 MIN: CPT | Performed by: FAMILY MEDICINE

## 2022-10-07 RX ORDER — IBUPROFEN 600 MG/1
600 TABLET ORAL EVERY 6 HOURS PRN
Qty: 30 TABLET | Refills: 1 | Status: SHIPPED | OUTPATIENT
Start: 2022-10-07

## 2022-10-07 RX ORDER — TIZANIDINE 4 MG/1
4 TABLET ORAL
Qty: 60 TABLET | Refills: 0 | Status: SHIPPED | OUTPATIENT
Start: 2022-10-07

## 2022-10-07 RX ORDER — NICOTINE 21 MG/24HR
1 PATCH, TRANSDERMAL 24 HOURS TRANSDERMAL EVERY 24 HOURS
Qty: 28 PATCH | Refills: 2 | Status: SHIPPED | OUTPATIENT
Start: 2022-10-07

## 2022-10-07 NOTE — PROGRESS NOTES
Assessment/Plan     This is a 50 y o  female who presents for OMT follow-up for migraines and chronic back pain  Plan:   1  Patient tolerated OMT well for the above problems,  advised patient to drink fluids and can use NSAID for soreness after treatment     2  OMT Follow up in 4 weeks  Subjective     Elena Richard is a 50 y o  female and is here for a OMT follow up  The patient reports chronic migraines, last was yesterday which resolved with one dose of Maxalt  Reports pain mostly in trapezius area  Also reports some low back pain that is intermittent  Is the patient taking Pain medication? takes tizanidine prn for back pain/muscle spasms, tylenol prn  Has the patient completed physical therapy for this condition? no  Did Patient symptoms improve from last OMT appointment? this is first visit in some time    The following portions of the patient's history were reviewed and updated as appropriate: current medications, past surgical history and problem list     Review of Systems  Do you have pain that bothers you in your daily life? Not asked  Review of Systems   Musculoskeletal: Positive for back pain and neck pain         Objective     OMT Exam   Cervical:   Somatic Dysfunction:  Tissue Texture Changes, Asymmetry , Restriction and Tenderness  Severity :  2  Osteopathic Findings: hypertonicity of musculature  Treatment Method: ST and MFR  Response: improved  Thoracic T1-4:   Somatic Dysfunction:  Tissue Texture Changes, Asymmetry , Restriction and Tenderness  Severity :  2  Osteopathic Findings: hypertonicity of musculature  Treatment Method: ST and MFR  Response: improved  Thoracic T5-9:   Somatic Dysfunction:  Tissue Texture Changes, Asymmetry , Restriction and Tenderness  Severity :  2  Osteopathic Findings: hypertonicity of musculature  Treatment Method: ST and MFR  Response: improved  Thoracic T10-12:  Somatic Dysfunction:  Tissue Texture Changes, Asymmetry , Restriction and Tenderness  Severity : 2  Osteopathic Findings: hypertonicity of musculature  Treatment Method: ST and MFR  Response: improved        KARLEE Nieto  [address] [employer]   5:23 PM

## 2022-10-21 DIAGNOSIS — T65.294A TOXIC EFFECT OF TOBACCO, UNDETERMINED INTENT, INITIAL ENCOUNTER: ICD-10-CM

## 2022-10-21 RX ORDER — VARENICLINE TARTRATE 1 MG/1
TABLET, FILM COATED ORAL
Qty: 168 TABLET | Refills: 1 | Status: SHIPPED | OUTPATIENT
Start: 2022-10-21

## 2022-11-07 ENCOUNTER — TELEPHONE (OUTPATIENT)
Dept: FAMILY MEDICINE CLINIC | Facility: CLINIC | Age: 47
End: 2022-11-07

## 2023-01-04 DIAGNOSIS — G47.00 INSOMNIA, UNSPECIFIED TYPE: ICD-10-CM

## 2023-01-04 RX ORDER — TRAZODONE HYDROCHLORIDE 150 MG/1
TABLET ORAL
Qty: 90 TABLET | Refills: 1 | Status: SHIPPED | OUTPATIENT
Start: 2023-01-04

## 2023-01-29 DIAGNOSIS — G43.909 MIGRAINE WITHOUT STATUS MIGRAINOSUS, NOT INTRACTABLE, UNSPECIFIED MIGRAINE TYPE: ICD-10-CM

## 2023-02-02 RX ORDER — RIZATRIPTAN BENZOATE 5 MG/1
5 TABLET, ORALLY DISINTEGRATING ORAL AS NEEDED
Qty: 9 TABLET | Refills: 0 | Status: SHIPPED | OUTPATIENT
Start: 2023-02-02

## 2023-03-02 DIAGNOSIS — G43.909 MIGRAINE WITHOUT STATUS MIGRAINOSUS, NOT INTRACTABLE, UNSPECIFIED MIGRAINE TYPE: ICD-10-CM

## 2023-03-03 RX ORDER — RIZATRIPTAN BENZOATE 5 MG/1
5 TABLET, ORALLY DISINTEGRATING ORAL AS NEEDED
Qty: 9 TABLET | Refills: 0 | Status: SHIPPED | OUTPATIENT
Start: 2023-03-03

## 2023-03-10 ENCOUNTER — OFFICE VISIT (OUTPATIENT)
Dept: OBGYN CLINIC | Facility: CLINIC | Age: 48
End: 2023-03-10

## 2023-03-10 VITALS
WEIGHT: 193.4 LBS | DIASTOLIC BLOOD PRESSURE: 78 MMHG | BODY MASS INDEX: 30.35 KG/M2 | HEART RATE: 96 BPM | HEIGHT: 67 IN | SYSTOLIC BLOOD PRESSURE: 151 MMHG

## 2023-03-10 DIAGNOSIS — S39.93XA INJURY OF VAGINA, INITIAL ENCOUNTER: ICD-10-CM

## 2023-03-10 DIAGNOSIS — N95.2 VAGINAL ATROPHY: Primary | ICD-10-CM

## 2023-03-10 DIAGNOSIS — Z11.3 SCREEN FOR STD (SEXUALLY TRANSMITTED DISEASE): ICD-10-CM

## 2023-03-10 PROBLEM — M46.1 INFLAMMATION OF SACROILIAC JOINT (HCC): Status: ACTIVE | Noted: 2023-03-10

## 2023-03-10 RX ORDER — PRASTERONE 6.5 MG/1
6.5 INSERT VAGINAL
Qty: 28 EACH | Refills: 11 | Status: SHIPPED | OUTPATIENT
Start: 2023-03-10

## 2023-03-10 NOTE — PROGRESS NOTES
PROBLEM GYNECOLOGICAL VISIT    Delmi Ugarte is a 50 y o  female who presents today with complaint of painful intercourse   Her general medical history has been reviewed and she reports it as follows:    Past Medical History:   Diagnosis Date   • Abnormal uterine bleeding    • Back pain    • Back pain with sciatica    • Cancer ([address] )     cervical   • Chronic pain    • Chronic pain disorder     back & neck   • Class 2 obesity with body mass index (BMI) of 36 0 to 36 9 in adult 2019   • Endometriosis    • Fibromyalgia, primary    • Insomnia    • Migraine      Past Surgical History:   Procedure Laterality Date   • COLONOSCOPY     • HYSTERECTOMY      AUB   • LAPAROSCOPY      adhesions   • IA INCISION EXTENSOR TENDON SHEATH WRIST Left 2019    Procedure: RELEASE DE QUERVAIN'S LEFT WRIST;  Surgeon: Fifi Hassan MD;  Location: [address] [place] [address];  Service: Orthopedics   • IA NEUROPLASTY &/TRANSPOS MEDIAN NRV CARPAL Carol Beards Left 2019    Procedure: RELEASE LEFT CARPAL TUNNEL;  Surgeon: Fifi Hassan MD;  Location: [address] [place] [address];  Service: Orthopedics     OB History        4    Para   4    Term                AB        Living   3       SAB        IAB        Ectopic        Multiple        Live Births   4           Obstetric Comments   All            Social History     Tobacco Use   • Smoking status: Every Day     Packs/day: 0 50     Types: Cigarettes   • Smokeless tobacco: Never   Vaping Use   • Vaping Use: Never used   Substance Use Topics   • Alcohol use: Yes     Comment: socially    • Drug use: No     Social History     Substance and Sexual Activity   Sexual Activity Yes   • Partners: Male       Current Outpatient Medications   Medication Instructions   • albuterol (ProAir HFA) 90 mcg/act inhaler 2 puffs, Inhalation, Every 6 hours PRN   • albuterol (ProAir HFA) 90 mcg/act inhaler 2 puffs, Inhalation, Every 6 hours PRN   • benzonatate (TESSALON PERLES) 100 mg, Oral, 3 times daily PRN   • benzonatate (TESSALON PERLES) 100 mg, Oral, 3 times daily PRN   • Blood Pressure Monitoring (Comfort Touch BP Cuff/Large) MISC 1 each, Does not apply, Daily   • conjugated estrogens (PREMARIN) 0 5 g, Vaginal, 2 times weekly   • fluticasone (FLONASE) 50 mcg/act nasal spray 1 spray, Nasal, Daily   • ibuprofen (MOTRIN) 600 mg, Oral, Every 6 hours PRN   • metFORMIN (GLUCOPHAGE) 500 mg, Oral, 3 times daily   • nicotine (NICODERM CQ) 21 mg/24 hr TD 24 hr patch 1 patch, Transdermal, Every 24 hours   • nicotine polacrilex (COMMIT) 4 mg, Mouth/Throat, As needed   • rizatriptan (MAXALT-MLT) 5 mg, Oral, As needed, Take at the onset of migraine; if symptoms continue or return, may take another dose at least 2 hours after first dose  Take no more than 2 doses in a day  • SUMAtriptan (IMITREX) 25 mg, Oral, Once as needed   • tiotropium (Spiriva Respimat) 1 25 MCG/ACT AERS inhaler 2 puffs, Inhalation, Daily   • tiZANidine (ZANAFLEX) 4 mg, Oral, Daily at bedtime   • topiramate (TOPAMAX) 50 mg, Oral, 2 times daily   • traZODone (DESYREL) 150 mg tablet TAKE 1 TABLET BY MOUTH EVERYDAY AT BEDTIME   • varenicline (CHANTIX) 1 mg tablet TAKE 1 TABLET BY MOUTH TWICE A DAY       History of Present Illness: Andrea Bourne presents today reporting pain after intercourse  She has had issues with significant vaginal atrophy  She was fist was evaluated for this issue 9/2021, at that time she reported for a few years she had changes in her vagina and she was no longer able to insert her finger into her vagina without discomfort  She was not sexually active at that time but was hoping to be  She was started on twice weekly Premarin cream  She reportes that she had intercourse this week for the first time since then  Since having intercourse she has had a burning feeling around her vaginal opening, feels they are similar to "brush burns"  She denies any discharge, odor, vaginitis symptoms  Review of Systems:  Review of Systems   Constitutional: Negative  Gastrointestinal: Negative  Genitourinary: Positive for vaginal pain  Physical Exam:  /78 (BP Location: Right arm, Patient Position: Sitting, Cuff Size: Large)   Pulse 96   Ht 5' 7" (1 702 m)   Wt 87 7 kg (193 lb 6 4 oz)   LMP 01/05/2005   BMI 30 29 kg/m²   Physical Exam  Constitutional:       Appearance: Normal appearance  Genitourinary: Moderate vaginal atrophy present  Neurological:      Mental Status: She is alert  Skin:     General: Skin is warm and dry  Psychiatric:         Mood and Affect: Mood normal          Behavior: Behavior normal    Vitals reviewed  Assessment:   1  Vaginal atrophy    2  Vaginal injury    3  STI screening     Plan:   1  Reviewed skin care measures including no soaps, scrubbing, tight clothing or intercourse as vaginal tissue heals  2  Will trial Intrarosa vaginal inserts for moderate/severe atrophy and dyspareunia  3  Referral placed for Dr Ilsa Hummel who specializes in treatment of menopausal issues  4  STI culture collected  Declines STI blood screenings for HIV, hep B, hep C and syphilis      Reviewed with patient that [facility], but that they will not necessarily be reviewed by me immediately  Explained that I will review results at my earliest opportunity and contact patient appropriately

## 2023-03-11 LAB
C TRACH DNA SPEC QL NAA+PROBE: NEGATIVE
N GONORRHOEA DNA SPEC QL NAA+PROBE: NEGATIVE

## 2023-03-13 ENCOUNTER — OFFICE VISIT (OUTPATIENT)
Dept: FAMILY MEDICINE CLINIC | Facility: CLINIC | Age: 48
End: 2023-03-13

## 2023-03-13 VITALS
SYSTOLIC BLOOD PRESSURE: 162 MMHG | HEART RATE: 91 BPM | HEIGHT: 67 IN | WEIGHT: 199.6 LBS | DIASTOLIC BLOOD PRESSURE: 100 MMHG | BODY MASS INDEX: 31.33 KG/M2 | TEMPERATURE: 97.9 F | RESPIRATION RATE: 18 BRPM | OXYGEN SATURATION: 98 %

## 2023-03-13 DIAGNOSIS — N95.2 VAGINAL ATROPHY: Primary | ICD-10-CM

## 2023-03-13 DIAGNOSIS — G47.00 INSOMNIA, UNSPECIFIED TYPE: ICD-10-CM

## 2023-03-13 DIAGNOSIS — G43.909 MIGRAINE WITHOUT STATUS MIGRAINOSUS, NOT INTRACTABLE, UNSPECIFIED MIGRAINE TYPE: ICD-10-CM

## 2023-03-13 DIAGNOSIS — G89.29 CHRONIC MIDLINE LOW BACK PAIN WITH SCIATICA, SCIATICA LATERALITY UNSPECIFIED: Primary | ICD-10-CM

## 2023-03-13 DIAGNOSIS — E66.9 OBESITY (BMI 30.0-34.9): ICD-10-CM

## 2023-03-13 DIAGNOSIS — M54.40 CHRONIC MIDLINE LOW BACK PAIN WITH SCIATICA, SCIATICA LATERALITY UNSPECIFIED: Primary | ICD-10-CM

## 2023-03-13 DIAGNOSIS — Z72.0 TOBACCO ABUSE: ICD-10-CM

## 2023-03-13 RX ORDER — ESTRADIOL 0.1 MG/G
CREAM VAGINAL
Qty: 42.5 G | Refills: 2 | Status: SHIPPED | OUTPATIENT
Start: 2023-03-13 | End: 2024-03-19

## 2023-03-13 RX ORDER — TIZANIDINE 4 MG/1
4 TABLET ORAL
Qty: 60 TABLET | Refills: 1 | Status: SHIPPED | OUTPATIENT
Start: 2023-03-13

## 2023-03-13 RX ORDER — TRAZODONE HYDROCHLORIDE 150 MG/1
150 TABLET ORAL
Qty: 90 TABLET | Refills: 1 | Status: SHIPPED | OUTPATIENT
Start: 2023-03-13

## 2023-03-13 RX ORDER — IBUPROFEN 600 MG/1
600 TABLET ORAL EVERY 6 HOURS PRN
Qty: 30 TABLET | Refills: 2 | Status: SHIPPED | OUTPATIENT
Start: 2023-03-13

## 2023-03-13 RX ORDER — TOPIRAMATE 50 MG/1
50 TABLET, FILM COATED ORAL 2 TIMES DAILY
Qty: 60 TABLET | Refills: 2 | Status: SHIPPED | OUTPATIENT
Start: 2023-03-13

## 2023-03-13 NOTE — ASSESSMENT & PLAN NOTE
Pt reports smoking 1 ppd  Issues with pharmacy in obtaining nicotine replacement  Reports smoking cessation program did not f/u with her after initial call  - Will reach out to smoking cessation program to determine reason for not following up   - Reordered referral to smoking cessation program and will reach out to previous consultant  - Discussed smoking cessation benefits and health risks associated with smoking including her migraines and high blood pressure    Tobacco Cessation Counseling: Tobacco cessation counseling and education was provided  The patient is sincerely urged to quit consumption of tobacco  She is ready to quit tobacco  The numerous health risks of tobacco consumption were discussed  Patient was provided a referral for tobacco cessation management

## 2023-03-13 NOTE — ASSESSMENT & PLAN NOTE
Pt reports headache 4/7 days last week, requiring BID Maxalt two of the days  Been off Topamax due to running out of medication past few months (she was prescribed this back in 2019 and reports good control of migraines on medication)   Upper back tight on exam    - Continue Maxalt PRN   - F/u OMT within 2 weeks   - Restart Topamax 50 mg BID - refilled today  - Discussed benefits of smoking cessation for headaches   - F/u 3m for evaluation of effectiveness of Topamax

## 2023-03-13 NOTE — PROGRESS NOTES
Name: Vaishali Fischer      : 1975      MRN: [id number]  Encounter Provider: Lacey Vera DO  Encounter Date: 3/13/2023   Encounter department: [address] [place] [address]     1  Chronic midline low back pain with sciatica, sciatica laterality unspecified  Assessment & Plan:  Long hx of chronic low back pain with increasingly worse episodes recently that have prevented her from getting out of bed some days  Pain is often managed with ibuprofen and tizanidine but it doesn't completely take it away  - states that she was previously followed with acute pain/neurosurg at [facility] but I cannot find records of this; she recalls discussing potential surgery with a previous provider but she was not interested at that time due to potential complications  - she is interested in following up with neurosurgery team to discuss potential surgical options, referred today  - offered referral for acute pain specialists for better pain management, but patient declines  - pt was previously referred to aquatic therapy but states that she was never given a call about scheduling this    Plan:  - Continue ibuprofen 600mg PRN, refilled today  - Continue tizanidine 4mg PRN, refilled today  - F/u OMT within 2 weeks   - Referral placed for neurosurgery at patient request due to renewed interest in potential surgical intervention  - F/u 3 months     Orders:  -     ibuprofen (MOTRIN) 600 mg tablet; Take 1 tablet (600 mg total) by mouth every 6 (six) hours as needed for mild pain  -     tiZANidine (ZANAFLEX) 4 mg tablet; Take 1 tablet (4 mg total) by mouth daily at bedtime  -     Ambulatory Referral to Neurosurgery; Future    2  Obesity (BMI 30 0-34  9)  Assessment & Plan:  - was previously started on metformin 500 mg TID for weight loss in 2021 with good results and is interested in continuing  - discussed again about lifestyle modifications   - Continue Metformin - refilled today    Orders:  -     metFORMIN (GLUCOPHAGE) 500 mg tablet; Take 1 tablet (500 mg total) by mouth 3 (three) times a day    3  Insomnia, unspecified type  Assessment & Plan:  - Continue Trazodone 150mg QHS - refilled    Orders:  -     traZODone (DESYREL) 150 mg tablet; Take 1 tablet (150 mg total) by mouth daily at bedtime    4  Migraine without status migrainosus, not intractable, unspecified migraine type  Assessment & Plan:  Pt reports headache 4/7 days last week, requiring BID Maxalt two of the days  Been off Topamax due to running out of medication past few months (she was prescribed this back in 2019 and reports good control of migraines on medication)  Upper back tight on exam    - Continue Maxalt PRN   - F/u OMT within 2 weeks   - Restart Topamax 50 mg BID - refilled today  - Discussed benefits of smoking cessation for headaches   - F/u 3m for evaluation of effectiveness of Topamax     Orders:  -     topiramate (TOPAMAX) 50 MG tablet; Take 1 tablet (50 mg total) by mouth 2 (two) times a day    5  Tobacco abuse  Assessment & Plan:  Pt reports smoking 1 ppd  Issues with pharmacy in obtaining nicotine replacement  Reports smoking cessation program did not f/u with her after initial call  - Will reach out to smoking cessation program to determine reason for not following up   - Reordered referral to smoking cessation program and will reach out to previous consultant  - Discussed smoking cessation benefits and health risks associated with smoking including her migraines and high blood pressure    Tobacco Cessation Counseling: Tobacco cessation counseling and education was provided  The patient is sincerely urged to quit consumption of tobacco  She is ready to quit tobacco  The numerous health risks of tobacco consumption were discussed  Patient was provided a referral for tobacco cessation management        Orders:  -     Ambulatory Referral to Smoking Cessation Program; Future         Subjective      Patient is a 50 y o  female presenting to clinic for follow up of chronic back pain  Pt reports constant lower back and hip pain occurring everyday with radiation down her legs  Pt rates the pain today as a 5/10 but reports some days the pain restricts her from getting out of bed  Pt states ibuprofen, tizanidine, and OMT help for the moment but she does not get lasting relief  Pt reports she was going to specialist years ago and he brought up spinal surgery (fusion of L4/L5) as an option  Pt was scared of surgery so she stopped going and states she is now more open to surgery  Pt states PT aquatic therapy never followed up with her  Pt lost 10lbs since last visit in October  Pt states she had a headache 4/7 days last week, requiring Maxalt BID on two of those days  Pt states she runs out of pills before the end of the month  She reports trying Tylenol first for her headaches and if it does not help talking the Maxalt  Pt states she is currently smoking 1ppd  She reports issues with the pharmacy in obtaining nicotine replacement therapy such as patches, gum, Chantix  Pt reports smoking cessation program never followed up with patient  Review of Systems   Constitutional: Negative for chills and fever  HENT: Negative for congestion, ear pain, postnasal drip, rhinorrhea, sneezing and sore throat  Eyes: Negative for pain, discharge and visual disturbance  Respiratory: Negative for cough, chest tightness and shortness of breath  Cardiovascular: Negative for chest pain, palpitations and leg swelling  Gastrointestinal: Negative for abdominal distention, constipation, diarrhea, nausea and vomiting  Genitourinary: Negative for dysuria  Musculoskeletal: Positive for back pain  Skin: Negative for rash  Neurological: Positive for headaches  Negative for seizures and syncope         Current Outpatient Medications on File Prior to Visit   Medication Sig   • albuterol (ProAir HFA) 90 mcg/act inhaler Inhale 2 puffs every 6 (six) hours as needed for wheezing   • nicotine (NICODERM CQ) 21 mg/24 hr TD 24 hr patch Place 1 patch on the skin every 24 hours   • Prasterone (Intrarosa) 6 5 MG INST Insert 6 5 mg into the vagina daily at bedtime   • rizatriptan (MAXALT-MLT) 5 mg disintegrating tablet TAKE 1 TABLET (5 MG TOTAL) BY MOUTH AS NEEDED FOR MIGRAINE TAKE AT THE ONSET OF MIGRAINE IF SYMPTOMS CONTINUE OR RETURN, MAY TAKE ANOTHER DOSE AT LEAST 2 HOURS AFTER FIRST DOSE  TAKE NO MORE THAN 2 DOSES IN A DAY     • varenicline (CHANTIX) 1 mg tablet TAKE 1 TABLET BY MOUTH TWICE A DAY   • [DISCONTINUED] ibuprofen (MOTRIN) 600 mg tablet Take 1 tablet (600 mg total) by mouth every 6 (six) hours as needed for mild pain   • [DISCONTINUED] tiZANidine (ZANAFLEX) 4 mg tablet Take 1 tablet (4 mg total) by mouth daily at bedtime   • [DISCONTINUED] topiramate (TOPAMAX) 50 MG tablet Take 1 tablet (50 mg total) by mouth 2 (two) times a day   • [DISCONTINUED] traZODone (DESYREL) 150 mg tablet TAKE 1 TABLET BY MOUTH EVERYDAY AT BEDTIME   • albuterol (ProAir HFA) 90 mcg/act inhaler Inhale 2 puffs every 6 (six) hours as needed for shortness of breath   • benzonatate (TESSALON PERLES) 100 mg capsule Take 1 capsule (100 mg total) by mouth 3 (three) times a day as needed for cough   • benzonatate (TESSALON PERLES) 100 mg capsule Take 1 capsule (100 mg total) by mouth 3 (three) times a day as needed for cough   • Blood Pressure Monitoring (Comfort Touch BP Cuff/Large) MISC Use 1 each in the morning   • fluticasone (FLONASE) 50 mcg/act nasal spray 1 spray into each nostril daily   • nicotine polacrilex (COMMIT) 4 MG lozenge Apply 1 lozenge (4 mg total) to the mouth or throat as needed for smoking cessation   • tiotropium (Spiriva Respimat) 1 25 MCG/ACT AERS inhaler Inhale 2 puffs daily   • [DISCONTINUED] metFORMIN (GLUCOPHAGE) 500 mg tablet Take 1 tablet (500 mg total) by mouth 3 (three) times a day   • [DISCONTINUED] SUMAtriptan (Imitrex) 25 mg tablet Take 1 tablet (25 mg total) by mouth once as needed for migraine for up to 1 dose       Objective     /100 (BP Location: Left arm, Patient Position: Sitting, Cuff Size: Standard)   Pulse 91   Temp 97 9 °F (36 6 °C) (Temporal)   Resp 18   Ht 5' 7" (1 702 m)   Wt 90 5 kg (199 lb 9 6 oz)   LMP 01/05/2005   SpO2 98%   BMI 31 26 kg/m²     Physical Exam  Constitutional:       General: She is not in acute distress  Appearance: Normal appearance  She is obese  She is not ill-appearing, toxic-appearing or diaphoretic  HENT:      Head: Normocephalic and atraumatic  Right Ear: External ear normal       Left Ear: External ear normal       Nose: Nose normal       Mouth/Throat:      Mouth: Mucous membranes are moist    Eyes:      Conjunctiva/sclera: Conjunctivae normal    Cardiovascular:      Rate and Rhythm: Normal rate and regular rhythm  Heart sounds: No murmur heard  Pulmonary:      Effort: Pulmonary effort is normal  No respiratory distress  Breath sounds: No wheezing, rhonchi or rales  Abdominal:      General: There is no distension  Tenderness: There is no abdominal tenderness  Musculoskeletal:         General: Tenderness present  Cervical back: Normal range of motion  Right lower leg: No edema  Left lower leg: No edema  Neurological:      General: No focal deficit present  Mental Status: She is alert     Psychiatric:         Mood and Affect: Mood normal          Behavior: Behavior normal        Yodit Vee DO

## 2023-03-13 NOTE — ASSESSMENT & PLAN NOTE
Long hx of chronic low back pain with increasingly worse episodes recently that have prevented her from getting out of bed some days  Pain is often managed with ibuprofen and tizanidine but it doesn't completely take it away    - states that she was previously followed with acute pain/neurosurg at [facility] but I cannot find records of this; she recalls discussing potential surgery with a previous provider but she was not interested at that time due to potential complications  - she is interested in following up with neurosurgery team to discuss potential surgical options, referred today  - offered referral for acute pain specialists for better pain management, but patient declines  - pt was previously referred to aquatic therapy but states that she was never given a call about scheduling this    Plan:  - Continue ibuprofen 600mg PRN, refilled today  - Continue tizanidine 4mg PRN, refilled today  - F/u OMT within 2 weeks   - Referral placed for neurosurgery at patient request due to renewed interest in potential surgical intervention  - F/u 3 months

## 2023-03-13 NOTE — ASSESSMENT & PLAN NOTE
- was previously started on metformin 500 mg TID for weight loss in 7/2021 with good results and is interested in continuing  - discussed again about lifestyle modifications   - Continue Metformin - refilled today

## 2023-03-16 PROBLEM — M54.16 LUMBAR RADICULOPATHY: Status: ACTIVE | Noted: 2023-03-16

## 2023-03-20 ENCOUNTER — OFFICE VISIT (OUTPATIENT)
Dept: FAMILY MEDICINE CLINIC | Facility: CLINIC | Age: 48
End: 2023-03-20

## 2023-03-20 VITALS — WEIGHT: 197.6 LBS | HEIGHT: 67 IN | BODY MASS INDEX: 31.01 KG/M2

## 2023-03-20 DIAGNOSIS — M99.01 SOMATIC DYSFUNCTION OF CERVICAL REGION: Primary | ICD-10-CM

## 2023-03-20 DIAGNOSIS — G43.909 MIGRAINE WITHOUT STATUS MIGRAINOSUS, NOT INTRACTABLE, UNSPECIFIED MIGRAINE TYPE: ICD-10-CM

## 2023-03-20 DIAGNOSIS — M99.09 SOMATIC DYSFUNCTION OF BACK: ICD-10-CM

## 2023-03-20 DIAGNOSIS — M99.06 SOMATIC DYSFUNCTION OF RIGHT LOWER EXTREMITY: ICD-10-CM

## 2023-03-20 NOTE — PROGRESS NOTES
Name: Noah Mcneal      : 1975      MRN: [id number]  Encounter Provider: Conner Chatman DO  Encounter Date: 3/20/2023   Encounter department: [address] [place] [address]     1  Somatic dysfunction of cervical region  Assessment & Plan:  Hypertonicity of paraspinal musculature noted  Soft tissue muscle energy treatment provided with improvement  Recommend increase of fluids Tylenol/Motrin as needed to help with the soreness  2  Somatic dysfunction of back  Assessment & Plan:  Hypertonicity of paraspinal musculature noted  Soft tissue muscle energy treatment provided with improvement  Recommend increase of fluids Tylenol/Motrin as needed to help with the soreness  3  Somatic dysfunction of right lower extremity  Assessment & Plan: Onset began 3 days ago  Improvement with soft tissue and myofascial release as well as muscle energy  Continue daily stretches, exercise  4  Migraine without status migrainosus, not intractable, unspecified migraine type  Assessment & Plan:  - Restart Topamax 50 mg twice daily last week with good results; patient reports a severe headache since starting medication  -Continue Maxalt as needed  -Follow-up in 3 months           Subjective      51 y/o F presents for OMT for neck pain, chronic back pain, new onset right low back pain with radiation  She takes Tylenol and ibuprofen as needed as as needed tizanidine  Review of Systems   Respiratory: Negative for shortness of breath  Cardiovascular: Negative for chest pain and palpitations  Gastrointestinal: Negative for abdominal pain  Musculoskeletal: Positive for arthralgias, back pain and myalgias  Neurological: Positive for headaches (improved)  Negative for dizziness and light-headedness         Current Outpatient Medications on File Prior to Visit   Medication Sig   • albuterol (ProAir HFA) 90 mcg/act inhaler Inhale 2 puffs every 6 (six) hours as needed for shortness of breath   • albuterol (ProAir HFA) 90 mcg/act inhaler Inhale 2 puffs every 6 (six) hours as needed for wheezing   • benzonatate (TESSALON PERLES) 100 mg capsule Take 1 capsule (100 mg total) by mouth 3 (three) times a day as needed for cough   • benzonatate (TESSALON PERLES) 100 mg capsule Take 1 capsule (100 mg total) by mouth 3 (three) times a day as needed for cough   • Blood Pressure Monitoring (Comfort Touch BP Cuff/Large) MISC Use 1 each in the morning   • estradiol (ESTRACE VAGINAL) 0 1 mg/g vaginal cream Insert 2 g into the vagina daily for 7 days, THEN 1 g 2 (two) times a week  • fluticasone (FLONASE) 50 mcg/act nasal spray 1 spray into each nostril daily   • ibuprofen (MOTRIN) 600 mg tablet Take 1 tablet (600 mg total) by mouth every 6 (six) hours as needed for mild pain   • metFORMIN (GLUCOPHAGE) 500 mg tablet Take 1 tablet (500 mg total) by mouth 3 (three) times a day   • nicotine (NICODERM CQ) 21 mg/24 hr TD 24 hr patch Place 1 patch on the skin every 24 hours   • nicotine polacrilex (COMMIT) 4 MG lozenge Apply 1 lozenge (4 mg total) to the mouth or throat as needed for smoking cessation   • Prasterone (Intrarosa) 6 5 MG INST Insert 6 5 mg into the vagina daily at bedtime   • rizatriptan (MAXALT-MLT) 5 mg disintegrating tablet TAKE 1 TABLET (5 MG TOTAL) BY MOUTH AS NEEDED FOR MIGRAINE TAKE AT THE ONSET OF MIGRAINE IF SYMPTOMS CONTINUE OR RETURN, MAY TAKE ANOTHER DOSE AT LEAST 2 HOURS AFTER FIRST DOSE  TAKE NO MORE THAN 2 DOSES IN A DAY     • tiotropium (Spiriva Respimat) 1 25 MCG/ACT AERS inhaler Inhale 2 puffs daily   • tiZANidine (ZANAFLEX) 4 mg tablet Take 1 tablet (4 mg total) by mouth daily at bedtime   • topiramate (TOPAMAX) 50 MG tablet Take 1 tablet (50 mg total) by mouth 2 (two) times a day   • traZODone (DESYREL) 150 mg tablet Take 1 tablet (150 mg total) by mouth daily at bedtime   • varenicline (CHANTIX) 1 mg tablet TAKE 1 TABLET BY MOUTH TWICE A DAY       Objective  5' 7" (1 702 m)   Wt 89 6 kg (197 lb 9 6 oz)   LMP 01/05/2005   BMI 30 95 kg/m²     Physical Exam  Vitals reviewed  Constitutional:       General: She is not in acute distress  Appearance: Normal appearance  HENT:      Head: Normocephalic and atraumatic  Right Ear: External ear normal       Left Ear: External ear normal       Nose: Nose normal       Mouth/Throat:      Pharynx: Oropharynx is clear  Eyes:      Conjunctiva/sclera: Conjunctivae normal    Cardiovascular:      Rate and Rhythm: Normal rate  Pulses: Normal pulses  Pulmonary:      Effort: Pulmonary effort is normal    Abdominal:      Palpations: Abdomen is soft  Musculoskeletal:      Cervical back: Neck supple  Skin:     General: Skin is warm  Capillary Refill: Capillary refill takes less than 2 seconds  Neurological:      Mental Status: She is alert and oriented to person, place, and time  Gait: Gait normal    Psychiatric:         Mood and Affect: Mood normal          Behavior: Behavior normal          Thought Content:  Thought content normal          Judgment: Judgment normal           OMT Exam   Cervical:   Somatic Dysfunction:  Tissue Texture Changes, Asymmetry  and Restriction  Severity :  2  Osteopathic Findings: hypertonicity of paraspinal musculature  Treatment Method: ST  Response: resolved  Thoracic T1-4:   Somatic Dysfunction:  Tissue Texture Changes, Asymmetry , Restriction and Tenderness  Severity :  2  Osteopathic Findings: hypertonicity of paraspinal musculature  Treatment Method: ST and MFR  Response: improved  Thoracic T5-9:   Somatic Dysfunction:  Tissue Texture Changes, Asymmetry , Restriction and Tenderness  Severity :  1  Osteopathic Findings: hypertonicity of paraspinal musculature  Treatment Method: ST and MFR  Response: improved  Thoracic T10-12:  Somatic Dysfunction:  Tissue Texture Changes, Asymmetry , Restriction and Tenderness  Severity :  1  Osteopathic Findings: hypertonicity of paraspinal musculature  Treatment Method: ST and MFR  Response: improved  Lumbar:  Somatic Dysfunction:  Tissue Texture Changes, Asymmetry , Restriction and Tenderness  Severity :  2  Osteopathic Findings: hypertonicity of paraspinal musculature  Treatment Method: ST and MFR  Response: resolved  Right Lower Extremity:  Somatic Dysfunction:  Tissue Texture Changes, Asymmetry , Restriction and Tenderness  Severity :  2  Osteopathic Findings: hypertonicity of paraspinal musculature, stiffness R hip  Treatment Method: ME and ST  Response: improved      Yodit Vee, DO

## 2023-03-20 NOTE — ASSESSMENT & PLAN NOTE
Hypertonicity of paraspinal musculature noted  Soft tissue muscle energy treatment provided with improvement  Recommend increase of fluids Tylenol/Motrin as needed to help with the soreness

## 2023-03-20 NOTE — ASSESSMENT & PLAN NOTE
- Restart Topamax 50 mg twice daily last week with good results; patient reports a severe headache since starting medication  -Continue Maxalt as needed  -Follow-up in 3 months

## 2023-03-20 NOTE — ASSESSMENT & PLAN NOTE
Onset began 3 days ago  Improvement with soft tissue and myofascial release as well as muscle energy  Continue daily stretches, exercise

## 2023-03-23 ENCOUNTER — CONSULT (OUTPATIENT)
Dept: NEUROSURGERY | Facility: CLINIC | Age: 48
End: 2023-03-23

## 2023-03-23 ENCOUNTER — OFFICE VISIT (OUTPATIENT)
Dept: FAMILY MEDICINE CLINIC | Facility: CLINIC | Age: 48
End: 2023-03-23

## 2023-03-23 ENCOUNTER — TELEPHONE (OUTPATIENT)
Dept: FAMILY MEDICINE CLINIC | Facility: CLINIC | Age: 48
End: 2023-03-23

## 2023-03-23 VITALS
BODY MASS INDEX: 30.78 KG/M2 | RESPIRATION RATE: 18 BRPM | HEART RATE: 83 BPM | TEMPERATURE: 98.2 F | WEIGHT: 196.1 LBS | HEIGHT: 67 IN | OXYGEN SATURATION: 97 %

## 2023-03-23 VITALS
RESPIRATION RATE: 18 BRPM | HEART RATE: 93 BPM | SYSTOLIC BLOOD PRESSURE: 132 MMHG | OXYGEN SATURATION: 98 % | DIASTOLIC BLOOD PRESSURE: 86 MMHG | TEMPERATURE: 97.6 F | WEIGHT: 196.8 LBS | BODY MASS INDEX: 30.82 KG/M2

## 2023-03-23 DIAGNOSIS — G89.29 CHRONIC BILATERAL LOW BACK PAIN, UNSPECIFIED WHETHER SCIATICA PRESENT: ICD-10-CM

## 2023-03-23 DIAGNOSIS — M54.40 CHRONIC MIDLINE LOW BACK PAIN WITH SCIATICA, SCIATICA LATERALITY UNSPECIFIED: Primary | ICD-10-CM

## 2023-03-23 DIAGNOSIS — M54.50 CHRONIC BILATERAL LOW BACK PAIN, UNSPECIFIED WHETHER SCIATICA PRESENT: ICD-10-CM

## 2023-03-23 DIAGNOSIS — G89.29 CHRONIC MIDLINE LOW BACK PAIN WITH SCIATICA, SCIATICA LATERALITY UNSPECIFIED: Primary | ICD-10-CM

## 2023-03-23 DIAGNOSIS — Z02.89 ENCOUNTER FOR COMPLETION OF FORM WITH PATIENT: Primary | ICD-10-CM

## 2023-03-23 NOTE — ASSESSMENT & PLAN NOTE
Follow-up with neurosurgery earlier today who recommended conservative management with regular physical therapy potentially steroid injections in her back  She will follow-up with them in 3 months for clinical evaluation and consideration of any further imaging

## 2023-03-23 NOTE — PROGRESS NOTES
Name: Bharathi Valenzuela      : 1975      MRN: [id number]  Encounter Provider: Jude Callahan DO  Encounter Date: 3/23/2023   Encounter department: [address] [place] [address]  Encounter for completion of form with patient  Assessment & Plan:  Form completed with patient indicating that she is unable to work at this time in setting of her chronic lumbar pain for which she is following with neurosurgery and starting conservative management with physical therapy as well as medication therapy and OMT  Copy made to be put into chart  2  Chronic bilateral low back pain, unspecified whether sciatica present  Assessment & Plan:  Follow-up with neurosurgery earlier today who recommended conservative management with regular physical therapy potentially steroid injections in her back  She will follow-up with them in 3 months for clinical evaluation and consideration of any further imaging  Subjective      24-year-old female presents for form completion  Patient reports that she received a form to be completed by physician for spousal support due to her disability and inability to work  Requesting completion today  She does note that she follows with neurosurgery this morning who is recommending conservative management at this time with aquatic therapy and steroid injections in her back for her chronic back pain  Review of Systems   Constitutional: Negative for chills and fever  Respiratory: Negative for shortness of breath  Musculoskeletal: Positive for arthralgias and back pain         Current Outpatient Medications on File Prior to Visit   Medication Sig   • ibuprofen (MOTRIN) 600 mg tablet Take 1 tablet (600 mg total) by mouth every 6 (six) hours as needed for mild pain   • metFORMIN (GLUCOPHAGE) 500 mg tablet Take 1 tablet (500 mg total) by mouth 3 (three) times a day   • rizatriptan (MAXALT-MLT) 5 mg disintegrating tablet TAKE 1 TABLET (5 MG TOTAL) BY MOUTH AS NEEDED FOR MIGRAINE TAKE AT THE ONSET OF MIGRAINE IF SYMPTOMS CONTINUE OR RETURN, MAY TAKE ANOTHER DOSE AT LEAST 2 HOURS AFTER FIRST DOSE  TAKE NO MORE THAN 2 DOSES IN A DAY  • tiZANidine (ZANAFLEX) 4 mg tablet Take 1 tablet (4 mg total) by mouth daily at bedtime   • topiramate (TOPAMAX) 50 MG tablet Take 1 tablet (50 mg total) by mouth 2 (two) times a day   • traZODone (DESYREL) 150 mg tablet Take 1 tablet (150 mg total) by mouth daily at bedtime   • estradiol (ESTRACE VAGINAL) 0 1 mg/g vaginal cream Insert 2 g into the vagina daily for 7 days, THEN 1 g 2 (two) times a week   (Patient not taking: Reported on 3/23/2023)   • [DISCONTINUED] albuterol (ProAir HFA) 90 mcg/act inhaler Inhale 2 puffs every 6 (six) hours as needed for shortness of breath   • [DISCONTINUED] albuterol (ProAir HFA) 90 mcg/act inhaler Inhale 2 puffs every 6 (six) hours as needed for wheezing (Patient not taking: Reported on 3/23/2023)   • [DISCONTINUED] benzonatate (TESSALON PERLES) 100 mg capsule Take 1 capsule (100 mg total) by mouth 3 (three) times a day as needed for cough   • [DISCONTINUED] benzonatate (TESSALON PERLES) 100 mg capsule Take 1 capsule (100 mg total) by mouth 3 (three) times a day as needed for cough   • [DISCONTINUED] Blood Pressure Monitoring (Comfort Touch BP Cuff/Large) MISC Use 1 each in the morning   • [DISCONTINUED] fluticasone (FLONASE) 50 mcg/act nasal spray 1 spray into each nostril daily   • [DISCONTINUED] nicotine (NICODERM CQ) 21 mg/24 hr TD 24 hr patch Place 1 patch on the skin every 24 hours (Patient not taking: Reported on 3/23/2023)   • [DISCONTINUED] nicotine polacrilex (COMMIT) 4 MG lozenge Apply 1 lozenge (4 mg total) to the mouth or throat as needed for smoking cessation   • [DISCONTINUED] Prasterone (Intrarosa) 6 5 MG INST Insert 6 5 mg into the vagina daily at bedtime (Patient not taking: Reported on 3/23/2023)   • [DISCONTINUED] tiotropium (Spiriva Respimat) 1 25 MCG/ACT AERS inhaler Inhale 2 puffs daily   • [DISCONTINUED] varenicline (CHANTIX) 1 mg tablet TAKE 1 TABLET BY MOUTH TWICE A DAY (Patient not taking: Reported on 3/23/2023)       Objective     /86   Pulse 93   Temp 97 6 °F (36 4 °C) (Temporal)   Resp 18   Wt 89 3 kg (196 lb 12 8 oz)   LMP 01/05/2005   SpO2 98%   BMI 30 82 kg/m²     Physical Exam  Vitals reviewed  Constitutional:       General: She is not in acute distress  Appearance: Normal appearance  HENT:      Head: Normocephalic and atraumatic  Right Ear: External ear normal       Left Ear: External ear normal       Nose: Nose normal       Mouth/Throat:      Pharynx: Oropharynx is clear  Eyes:      Conjunctiva/sclera: Conjunctivae normal    Cardiovascular:      Rate and Rhythm: Normal rate  Pulses: Normal pulses  Pulmonary:      Effort: Pulmonary effort is normal    Abdominal:      Palpations: Abdomen is soft  Musculoskeletal:      Cervical back: Neck supple  Skin:     General: Skin is warm  Capillary Refill: Capillary refill takes less than 2 seconds  Neurological:      Mental Status: She is alert and oriented to person, place, and time  Gait: Gait normal    Psychiatric:         Mood and Affect: Mood normal          Behavior: Behavior normal          Thought Content:  Thought content normal          Judgment: Judgment normal        Yodit Vee DO

## 2023-03-23 NOTE — PROGRESS NOTES
Neurosurgery Office Note  Linnea Angulo 50 y o  female MRN: [id number]      Assessment/Plan     Chronic lumbar pain  Presents as referral from PCP for evaluation of chronic lumbar pain  · Radiates into bilateral posterior legs down to dorsum of feet with associated numbness/tingling  · In 2004, was under care of PM and did physical therapy for symptoms  States these measures did not relieve symptoms and she was overly sedated from being on narcotics  Aqua therapy did offer some improvement  · Additionally complains of bilateral hip pain  · On exam, with subjective S1 distribution radicular pain/neuropathy  Motor strength intact  No BBI  Denies saddle/groin numbness  Plan:  · Reviewed that at this point would recommend trial of conservative management prior to ordering updated imaging or consideration for surgery  · Patient is hesitant to pursue regular PT as this significantly exacerbated her symptoms in the past but she is willing to re-attempt it  · She describes being somewhat traumatized by experience with pain management in the past as she was only a lot of narcotics and felt like "a zombie " States injections were painful and relief only lasted about 2 weeks  · She is willing to re-try conservative management  · Return in 3 months for clinical evaluation and consideration for updated imaging including MRI of lumbar spine and flexion/extension lumbar x-rays  · Reviewed smoking cessation  · Follow up as discussed  Diagnoses and all orders for this visit:    Chronic midline low back pain with sciatica, sciatica laterality unspecified  -     Ambulatory Referral to Neurosurgery  -     Ambulatory Referral to Physical Therapy; Future  -     Ambulatory Referral to Pain Management;  Future          I have spent a total time of 30 minutes on 03/23/23 in caring for this patient including Diagnostic results, Instructions for management, Patient and family education, Importance of tx compliance, Risk factor reductions, Impressions, Counseling / Coordination of care, Documenting in the medical record, Reviewing / ordering tests, medicine, procedures   and Obtaining or reviewing history    CHIEF COMPLAINT    Chief Complaint   Patient presents with   • Consult       HISTORY    History of Present Illness     50y o  year old female     With past medical history of cervical cancer, endometriosis, fibromyalgia, bipolar, migraine, chronic back pain, tobacco use, who presents as referral from primary care for evaluation of chronic low back pain  She states pain has been ongoing for many many years and previously received treatment for similar pain in 2004  She states that she was under the care of physical therapy and pain management  She states that physical therapy made all of her symptoms worse except for aqua therapy which did provide some relief  Through pain management she was on narcotics, muscle relaxers and Lyrica and states that she felt like a "zombie" and did not feel that any of these medications really helped her pain  She states she was told in the past that her L3 and L4 vertebrae are "" and surgery was entertained but she was fearful to pursue surgical intervention  She describes the pain as constant across her low back  It often radiates posteriorly down her legs into the bottom of her feet with associated numbness and tingling  She often has to get up from a seated position in order to improve her circulation and decrease her symptoms  She describes that the symptoms worsen with activity  She denies any associated bowel or bladder incontinence or groin or saddle anesthesia  She takes tizanidine at night that helps with her muscle spasm  She started taking ibuprofen as well with some moderate relief  She is on disability secondary to her back issues and bipolar disorder  She lives at home with her daughter        See Discussion    REVIEW OF SYSTEMS    Review of Systems Constitutional: Negative  HENT: Negative  Negative for tinnitus and trouble swallowing  Eyes: Negative  Respiratory: Negative  Cardiovascular: Negative  Gastrointestinal: Negative  Endocrine: Negative  Genitourinary: Negative  Musculoskeletal: Positive for back pain (lower back pain radiates to both sides down lwegs to feet worse right side constant mild to severe pain depending on movement), gait problem (no recent falls) and myalgias (muscle spasms lower back)  Skin: Negative  Allergic/Immunologic: Negative  Neurological: Positive for weakness (when having flare ups and in pain) and numbness (both legs ocassionally tunde to feet and all toes)  Negative for seizures, syncope and speech difficulty  Hematological: Bruises/bleeds easily  Psychiatric/Behavioral: Positive for sleep disturbance (has trouble sleeping medication helps)  ROS obtained by MA  Reviewed  See HPI  Meds/Allergies     Current Outpatient Medications   Medication Sig Dispense Refill   • ibuprofen (MOTRIN) 600 mg tablet Take 1 tablet (600 mg total) by mouth every 6 (six) hours as needed for mild pain 30 tablet 2   • metFORMIN (GLUCOPHAGE) 500 mg tablet Take 1 tablet (500 mg total) by mouth 3 (three) times a day 90 tablet 5   • rizatriptan (MAXALT-MLT) 5 mg disintegrating tablet TAKE 1 TABLET (5 MG TOTAL) BY MOUTH AS NEEDED FOR MIGRAINE TAKE AT THE ONSET OF MIGRAINE IF SYMPTOMS CONTINUE OR RETURN, MAY TAKE ANOTHER DOSE AT LEAST 2 HOURS AFTER FIRST DOSE  TAKE NO MORE THAN 2 DOSES IN A DAY   9 tablet 0   • tiZANidine (ZANAFLEX) 4 mg tablet Take 1 tablet (4 mg total) by mouth daily at bedtime 60 tablet 1   • topiramate (TOPAMAX) 50 MG tablet Take 1 tablet (50 mg total) by mouth 2 (two) times a day 60 tablet 2   • traZODone (DESYREL) 150 mg tablet Take 1 tablet (150 mg total) by mouth daily at bedtime 90 tablet 1   • albuterol (ProAir HFA) 90 mcg/act inhaler Inhale 2 puffs every 6 (six) hours as needed for wheezing (Patient not taking: Reported on 3/23/2023) 8 5 g 1   • benzonatate (TESSALON PERLES) 100 mg capsule Take 1 capsule (100 mg total) by mouth 3 (three) times a day as needed for cough 30 capsule 0   • benzonatate (TESSALON PERLES) 100 mg capsule Take 1 capsule (100 mg total) by mouth 3 (three) times a day as needed for cough 30 capsule 0   • Blood Pressure Monitoring (Comfort Touch BP Cuff/Large) MISC Use 1 each in the morning 1 each 0   • estradiol (ESTRACE VAGINAL) 0 1 mg/g vaginal cream Insert 2 g into the vagina daily for 7 days, THEN 1 g 2 (two) times a week  (Patient not taking: Reported on 3/23/2023) 42 5 g 2   • fluticasone (FLONASE) 50 mcg/act nasal spray 1 spray into each nostril daily 16 g 1   • nicotine (NICODERM CQ) 21 mg/24 hr TD 24 hr patch Place 1 patch on the skin every 24 hours (Patient not taking: Reported on 3/23/2023) 28 patch 2   • nicotine polacrilex (COMMIT) 4 MG lozenge Apply 1 lozenge (4 mg total) to the mouth or throat as needed for smoking cessation 100 each 2   • Prasterone (Intrarosa) 6 5 MG INST Insert 6 5 mg into the vagina daily at bedtime (Patient not taking: Reported on 3/23/2023) 28 each 11   • tiotropium (Spiriva Respimat) 1 25 MCG/ACT AERS inhaler Inhale 2 puffs daily 12 g 3   • varenicline (CHANTIX) 1 mg tablet TAKE 1 TABLET BY MOUTH TWICE A DAY (Patient not taking: Reported on 3/23/2023) 168 tablet 1     No current facility-administered medications for this visit         Allergies   Allergen Reactions   • Penicillins Shortness Of Breath and Hives       PAST HISTORY    Past Medical History:   Diagnosis Date   • Abnormal uterine bleeding    • Back pain    • Back pain with sciatica    • Cancer (HCC)     cervical   • Chronic pain    • Chronic pain disorder     back & neck   • Class 2 obesity with body mass index (BMI) of 36 0 to 36 9 in adult 6/4/2019   • Endometriosis    • Fibromyalgia, primary    • Insomnia    • Lumbar radiculopathy 3/16/2023   • Migraine        Past Surgical History:   Procedure Laterality Date   • COLONOSCOPY     • HYSTERECTOMY  2004    AUB   • LAPAROSCOPY      adhesions   • SD INCISION EXTENSOR TENDON SHEATH WRIST Left 5/16/2019    Procedure: RELEASE DE QUERVAIN'S LEFT WRIST;  Surgeon: Alex Paul MD;  Location: [address] [place] [address];  Service: Orthopedics   • SD NEUROPLASTY &/TRANSPOS MEDIAN NRV CARPAL Brent Newer Left 5/16/2019    Procedure: RELEASE LEFT CARPAL TUNNEL;  Surgeon: Alex Paul MD;  Location: [address] [place] [address];  Service: Orthopedics       Social History     Tobacco Use   • Smoking status: Every Day     Packs/day: 0 50     Types: Cigarettes   • Smokeless tobacco: Never   Vaping Use   • Vaping Use: Never used   Substance Use Topics   • Alcohol use: Yes     Comment: socially    • Drug use: No       Family History   Problem Relation Age of Onset   • Bipolar disorder Mother         NOS   • Heart disease Mother    • Liver disease Mother    • Kidney disease Mother    • Diabetes type II Mother    • Other Father         Abandonment    • No Known Problems Son    • Bipolar disorder Daughter         NOS   • Diabetes type II Maternal Grandmother    • Leukemia Maternal Grandfather    • Lung cancer Maternal Aunt    • Thyroid disease Maternal Aunt          Above history personally reviewed  EXAM    Vitals:Pulse 83, temperature 98 2 °F (36 8 °C), resp  rate 18, height 5' 7" (1 702 m), weight 89 kg (196 lb 1 6 oz), last menstrual period 01/05/2005, SpO2 97 %  ,Body mass index is 30 71 kg/m²  Physical Exam  Constitutional:       Appearance: She is well-developed  HENT:      Head: Normocephalic and atraumatic  Eyes:      Extraocular Movements: EOM normal       Pupils: Pupils are equal, round, and reactive to light  Pulmonary:      Effort: Pulmonary effort is normal    Abdominal:      Palpations: Abdomen is soft  Musculoskeletal:         General: Normal range of motion  Cervical back: Normal range of motion and neck supple     Skin:     General: Skin is warm and dry    Neurological:      General: No focal deficit present  Mental Status: She is alert and oriented to person, place, and time  Mental status is at baseline  Cranial Nerves: No cranial nerve deficit  Sensory: Sensory deficit present  Motor: No weakness  Coordination: Coordination normal  Finger-Nose-Finger Test normal       Deep Tendon Reflexes: Reflexes normal       Reflex Scores:       Patellar reflexes are 2+ on the right side and 2+ on the left side  Achilles reflexes are 2+ on the right side and 2+ on the left side  Psychiatric:         Speech: Speech normal          Neurologic Exam     Mental Status   Oriented to person, place, and time  Oriented to person  Oriented to place  Oriented to time  Oriented to year, month and date  Registration: recalls 3 of 3 objects  Attention: normal  Concentration: normal    Speech: speech is normal   Level of consciousness: alert  Knowledge: good and consistent with education  Able to name object  Cranial Nerves     CN III, IV, VI   Pupils are equal, round, and reactive to light  Extraocular motions are normal    Right pupil: Size: 3 mm  Shape: regular  Reactivity: brisk  Consensual response: intact  Accommodation: intact  Left pupil: Size: 3 mm  Shape: regular  Reactivity: brisk  Consensual response: intact  Accommodation: intact  Nystagmus: none   Diplopia: none  Conjugate gaze: present    CN V   Right facial sensation deficit: none  Left facial sensation deficit: none    CN VII   Facial expression full, symmetric       CN VIII   Hearing: intact    CN IX, X   Palate: symmetric    CN XI   Right sternocleidomastoid strength: normal  Left sternocleidomastoid strength: normal  Right trapezius strength: normal  Left trapezius strength: normal    CN XII   Tongue: not atrophic  Fasciculations: absent  Tongue deviation: none    Motor Exam   Muscle bulk: normal  Overall muscle tone: normal  Right arm pronator drift: absent  Left arm pronator drift: absent    Strength   Right deltoid: 5/5  Left deltoid: 5/5  Right biceps: 5/5  Left biceps: 5/5  Right triceps: 5/5  Left triceps: 5/5  Right quadriceps: 5/5  Left quadriceps: 5/5  Right hamstrin/5  Left hamstrin  Right anterior tibial: 5/  Left anterior tibial: 5  Right posterior tibial:   Left posterior tibial:   Right peroneal:   Left peroneal:   Right gastroc:   Left gastroc:     Sensory Exam   Right leg light touch: decreased from toes  Left leg light touch: decreased from toes  Proprioception normal      Gait, Coordination, and Reflexes     Coordination   Finger to nose coordination: normal    Tremor   Resting tremor: absent  Intention tremor: absent  Action tremor: absent    Reflexes   Right patellar: 2+  Left patellar: 2+  Right achilles: 2+  Left achilles: 2+  Right Garcia: absent  Left Garcia: absent  Right ankle clonus: absent  Left ankle clonus: absent        MEDICAL DECISION MAKING    Imaging Studies:     No results found  I have personally reviewed pertinent reports     and I have personally reviewed pertinent films in PACS

## 2023-03-23 NOTE — ASSESSMENT & PLAN NOTE
Presents as referral from PCP for evaluation of chronic lumbar pain  · Radiates into bilateral posterior legs down to dorsum of feet with associated numbness/tingling  · In 2004, was under care of PM and did physical therapy for symptoms  States these measures did not relieve symptoms and she was overly sedated from being on narcotics  Aqua therapy did offer some improvement  · Additionally complains of bilateral hip pain  · On exam, with subjective S1 distribution radicular pain/neuropathy  Motor strength intact  No BBI  Denies saddle/groin numbness  Plan:  · Reviewed that at this point would recommend trial of conservative management prior to ordering updated imaging or consideration for surgery  · Patient is hesitant to pursue regular PT as this significantly exacerbated her symptoms in the past but she is willing to re-attempt it  · She describes being somewhat traumatized by experience with pain management in the past as she was only a lot of narcotics and felt like "a zombie " States injections were painful and relief only lasted about 2 weeks  · She is willing to re-try conservative management  · Return in 3 months for clinical evaluation and consideration for updated imaging including MRI of lumbar spine and flexion/extension lumbar x-rays  · Reviewed smoking cessation  · Follow up as discussed

## 2023-03-23 NOTE — TELEPHONE ENCOUNTER
Folder (To be completed by) -Dr Vicki Samuels     Name of Form - Physician Verification Form    Color folder [facility]    Form to be given back to patient    Patient was made aware of the 7-10 business day form policy

## 2023-03-23 NOTE — ASSESSMENT & PLAN NOTE
Form completed with patient indicating that she is unable to work at this time in setting of her chronic lumbar pain for which she is following with neurosurgery and starting conservative management with physical therapy as well as medication therapy and OMT  Copy made to be put into chart

## 2023-04-06 DIAGNOSIS — G43.909 MIGRAINE WITHOUT STATUS MIGRAINOSUS, NOT INTRACTABLE, UNSPECIFIED MIGRAINE TYPE: ICD-10-CM

## 2023-04-06 RX ORDER — RIZATRIPTAN BENZOATE 5 MG/1
5 TABLET, ORALLY DISINTEGRATING ORAL AS NEEDED
Qty: 9 TABLET | Refills: 0 | Status: SHIPPED | OUTPATIENT
Start: 2023-04-06

## 2023-04-08 RX ORDER — TOPIRAMATE 50 MG/1
TABLET, FILM COATED ORAL
Qty: 180 TABLET | Refills: 1 | Status: SHIPPED | OUTPATIENT
Start: 2023-04-08

## 2023-05-11 DIAGNOSIS — G43.909 MIGRAINE WITHOUT STATUS MIGRAINOSUS, NOT INTRACTABLE, UNSPECIFIED MIGRAINE TYPE: ICD-10-CM

## 2023-05-11 RX ORDER — RIZATRIPTAN BENZOATE 5 MG/1
5 TABLET, ORALLY DISINTEGRATING ORAL AS NEEDED
Qty: 9 TABLET | Refills: 0 | Status: SHIPPED | OUTPATIENT
Start: 2023-05-11

## 2023-06-11 DIAGNOSIS — G43.909 MIGRAINE WITHOUT STATUS MIGRAINOSUS, NOT INTRACTABLE, UNSPECIFIED MIGRAINE TYPE: ICD-10-CM

## 2023-06-13 RX ORDER — RIZATRIPTAN BENZOATE 5 MG/1
5 TABLET, ORALLY DISINTEGRATING ORAL AS NEEDED
Qty: 9 TABLET | Refills: 0 | Status: SHIPPED | OUTPATIENT
Start: 2023-06-13

## 2023-06-15 ENCOUNTER — TELEPHONE (OUTPATIENT)
Dept: FAMILY MEDICINE CLINIC | Facility: CLINIC | Age: 48
End: 2023-06-15

## 2023-07-06 DIAGNOSIS — G89.29 CHRONIC MIDLINE LOW BACK PAIN WITH SCIATICA, SCIATICA LATERALITY UNSPECIFIED: ICD-10-CM

## 2023-07-06 DIAGNOSIS — M54.40 CHRONIC MIDLINE LOW BACK PAIN WITH SCIATICA, SCIATICA LATERALITY UNSPECIFIED: ICD-10-CM

## 2023-07-07 RX ORDER — TIZANIDINE 4 MG/1
TABLET ORAL
Qty: 90 TABLET | Refills: 1 | Status: SHIPPED | OUTPATIENT
Start: 2023-07-07

## 2023-07-13 ENCOUNTER — TELEPHONE (OUTPATIENT)
Dept: FAMILY MEDICINE CLINIC | Facility: CLINIC | Age: 48
End: 2023-07-13

## 2023-07-13 NOTE — TELEPHONE ENCOUNTER
Patient is calling to have PCP call her back, she is in [place] and wants to speak to provider.     Patient can be reached at [phone]

## 2023-07-18 DIAGNOSIS — M54.40 CHRONIC MIDLINE LOW BACK PAIN WITH SCIATICA, SCIATICA LATERALITY UNSPECIFIED: ICD-10-CM

## 2023-07-18 DIAGNOSIS — G89.29 CHRONIC MIDLINE LOW BACK PAIN WITH SCIATICA, SCIATICA LATERALITY UNSPECIFIED: ICD-10-CM

## 2023-07-18 DIAGNOSIS — G43.909 MIGRAINE WITHOUT STATUS MIGRAINOSUS, NOT INTRACTABLE, UNSPECIFIED MIGRAINE TYPE: ICD-10-CM

## 2023-07-18 RX ORDER — IBUPROFEN 600 MG/1
600 TABLET ORAL EVERY 6 HOURS PRN
Qty: 30 TABLET | Refills: 1 | Status: SHIPPED | OUTPATIENT
Start: 2023-07-18

## 2023-07-18 RX ORDER — RIZATRIPTAN BENZOATE 5 MG/1
5 TABLET, ORALLY DISINTEGRATING ORAL AS NEEDED
Qty: 9 TABLET | Refills: 0 | Status: SHIPPED | OUTPATIENT
Start: 2023-07-18

## 2023-07-18 NOTE — TELEPHONE ENCOUNTER
Patient is in [place], [place] and needs medication for she is currently visiting  Refills needed are   Ibuprofen (MOTRIN) and  rizatripan MAXALT) 5 mg    To a [facility] in [address] [place] [address]   [phone]    Patient can be reached [phone]

## 2023-08-15 DIAGNOSIS — G43.909 MIGRAINE WITHOUT STATUS MIGRAINOSUS, NOT INTRACTABLE, UNSPECIFIED MIGRAINE TYPE: ICD-10-CM

## 2023-08-15 RX ORDER — RIZATRIPTAN BENZOATE 5 MG/1
TABLET, ORALLY DISINTEGRATING ORAL
Qty: 9 TABLET | Refills: 0 | Status: SHIPPED | OUTPATIENT
Start: 2023-08-15

## 2023-08-15 NOTE — TELEPHONE ENCOUNTER
Will refill for this month but pt needs to schedule follow up to review her migraine history. Will need to consider increasing her maintenance medication if she's using Maxalt this often. Thank you!

## 2023-08-23 ENCOUNTER — OFFICE VISIT (OUTPATIENT)
Dept: FAMILY MEDICINE CLINIC | Facility: CLINIC | Age: 48
End: 2023-08-23

## 2023-08-23 VITALS
SYSTOLIC BLOOD PRESSURE: 171 MMHG | HEART RATE: 91 BPM | OXYGEN SATURATION: 98 % | HEIGHT: 67 IN | WEIGHT: 184.2 LBS | TEMPERATURE: 98.4 F | RESPIRATION RATE: 18 BRPM | DIASTOLIC BLOOD PRESSURE: 90 MMHG | BODY MASS INDEX: 28.91 KG/M2

## 2023-08-23 DIAGNOSIS — M25.551 LATERAL PAIN OF RIGHT HIP: Primary | ICD-10-CM

## 2023-08-23 DIAGNOSIS — M25.552 GREATER TROCHANTERIC PAIN SYNDROME OF BOTH LOWER EXTREMITIES: ICD-10-CM

## 2023-08-23 DIAGNOSIS — M25.551 GREATER TROCHANTERIC PAIN SYNDROME OF BOTH LOWER EXTREMITIES: ICD-10-CM

## 2023-08-23 DIAGNOSIS — M25.552 LATERAL PAIN OF LEFT HIP: ICD-10-CM

## 2023-08-23 DIAGNOSIS — G89.29 CHRONIC MIDLINE LOW BACK PAIN WITH SCIATICA, SCIATICA LATERALITY UNSPECIFIED: ICD-10-CM

## 2023-08-23 DIAGNOSIS — M54.40 CHRONIC MIDLINE LOW BACK PAIN WITH SCIATICA, SCIATICA LATERALITY UNSPECIFIED: ICD-10-CM

## 2023-08-23 PROCEDURE — 20610 DRAIN/INJ JOINT/BURSA W/O US: CPT | Performed by: FAMILY MEDICINE

## 2023-08-23 PROCEDURE — 99213 OFFICE O/P EST LOW 20 MIN: CPT | Performed by: FAMILY MEDICINE

## 2023-08-23 RX ORDER — IBUPROFEN 600 MG/1
600 TABLET ORAL EVERY 6 HOURS PRN
Qty: 30 TABLET | Refills: 1 | Status: SHIPPED | OUTPATIENT
Start: 2023-08-23

## 2023-08-23 RX ORDER — TRIAMCINOLONE ACETONIDE 40 MG/ML
40 INJECTION, SUSPENSION INTRA-ARTICULAR; INTRAMUSCULAR
Status: COMPLETED | OUTPATIENT
Start: 2023-08-23 | End: 2023-08-23

## 2023-08-23 RX ORDER — BUPIVACAINE HYDROCHLORIDE 2.5 MG/ML
4 INJECTION, SOLUTION INFILTRATION; PERINEURAL
Status: COMPLETED | OUTPATIENT
Start: 2023-08-23 | End: 2023-08-23

## 2023-08-23 RX ADMIN — TRIAMCINOLONE ACETONIDE 40 MG: 40 INJECTION, SUSPENSION INTRA-ARTICULAR; INTRAMUSCULAR at 09:40

## 2023-08-23 RX ADMIN — BUPIVACAINE HYDROCHLORIDE 4 ML: 2.5 INJECTION, SOLUTION INFILTRATION; PERINEURAL at 09:40

## 2023-08-23 NOTE — PROGRESS NOTES
Name: Salomón Zelaya      : 1975      MRN: [id number]  Encounter Provider: Milly Cabrera MD  Encounter Date: 2023   Encounter department: [address]     1. Lateral pain of right hip  -     Ambulatory Referral to Physical Therapy; Future  -     Large joint arthrocentesis: R greater trochanteric bursa  -     bupivacaine (MARCAINE) 0.25 % injection 4 mL  -     triamcinolone acetonide (KENALOG-40) 40 mg/mL injection 40 mg    2. Lateral pain of left hip  -     Ambulatory Referral to Physical Therapy; Future    3. Greater trochanteric pain syndrome of both lower extremities    4. Chronic midline low back pain with sciatica, sciatica laterality unspecified  -     ibuprofen (MOTRIN) 600 mg tablet; Take 1 tablet (600 mg total) by mouth every 6 (six) hours as needed for mild pain            Lateral pain of bilateral hip suspected secondary to bilateral greater trochanter pain syndrome. Low suspicion for hip OA as pain generator. [place] guided CSI to right greater trochanter bursa performed at this encounter  Referral to physical therapy  May take Tylenol/NSAIDs PRN for pain  May consider hip XR if symptom persist    We have reviewed clinical exam and findings and relevant study findings with patient as well as discussed management options, reviewed natural history, and engaged in shared decision-making. Follow-up: Return for follow-up with PCP for MAW visit. Subjective     Chief Complaint   Patient presents with   • Hip Pain     About 3 weeks ago her right hip started burning, now she has a burning sensation in both hips. Burns more when walking      HPI     77-year-old female presents for atraumatic lateral pain of bilateral hip started about 3 weeks ago. Denies any trauma or injury.       Pain level 6/10 at worst, burning, localized to lateral aspect of bilateral hip, worse with walking and weightbearing, alleviated with sitting down and rest.  Denies any distal weakness, numbness, or tingling. Patient has been taking OTC Tylenol and NSAID with mild improvement. Review of Systems    Current Outpatient Medications on File Prior to Visit   Medication Sig   • estradiol (ESTRACE VAGINAL) 0.1 mg/g vaginal cream Insert 2 g into the vagina daily for 7 days, THEN 1 g 2 (two) times a week. • rizatriptan (MAXALT-MLT) 5 mg disintegrating tablet DISSOLVE 1 TABLET IN MOUTH AS NEEDED FOR MIGRAINE, IF SYMPTOMS CONTINUE OR RETURN, MAY TAKE ANOTHER DOSE AT LEAST 2 HOURS AFTER FIRST DOSE. TAKE NO MORE THAN 2 DOSES IN A DAY. • tiZANidine (ZANAFLEX) 4 mg tablet TAKE 1 TABLET BY MOUTH DAILY AT BEDTIME   • topiramate (TOPAMAX) 50 MG tablet TAKE 1 TABLET BY MOUTH TWICE A DAY   • traZODone (DESYREL) 150 mg tablet Take 1 tablet (150 mg total) by mouth daily at bedtime   • [DISCONTINUED] ibuprofen (MOTRIN) 600 mg tablet Take 1 tablet (600 mg total) by mouth every 6 (six) hours as needed for mild pain   • metFORMIN (GLUCOPHAGE) 500 mg tablet Take 1 tablet (500 mg total) by mouth 3 (three) times a day       Objective     BP (!) 171/90 (BP Location: Left arm, Patient Position: Sitting, Cuff Size: Standard)   Pulse 91   Temp 98.4 °F (36.9 °C) (Temporal)   Resp 18   Ht 5' 7" (1.702 m)   Wt 83.6 kg (184 lb 3.2 oz)   LMP 01/05/2005   SpO2 98%   BMI 28.85 kg/m²     Physical Exam       Constitutional:  Vitals and nursing note reviewed. General: Normal appearance. Not ill-appearing, toxic-appearing or diaphoretic. not in acute distress. HENT: Head is normocephalic and atraumatic. Bilateral external ear and nose normal  Eyes: No discharge. Extraocular movements intact. Cardiovascular: Normal rate  Pulmonary:  Pulmonary effort is normal. No respiratory distress. Abdominal: There is no distension. Musculoskeletal: No gross injury or deformity except for other than mention below. Skin: Skin is warm. Neurological: Awake, alert, and mental status is at baseline. No gross focal deficit present. Psychiatric:  Mood normal. Behavior normal.     Lumbar Spine Examination:    Tenderness: no tenderness or spasm  Full ROM with no pain or limitations in flexion, extension, lateral bending and rotation  L1 - S1 dermatomal sensation Intact  Patellar reflex (L3-L4):  2+ and symmetric    Left Hip Examination:    Patient ambulates with Normal gait pattern  Assistive Device: No    Skin is warm and dry to touch with no wounds, erythema, increased warmth, or ecchymosis   No dislocation / gross deformity  Tenderness: + Greater trochanter  ROM: Full  Strength: 5/5 throughout    Log roll test:  No Pain  STEFANY (Alex's):  Pain  FADIR test: No Pain    Bilateral supine SLR:  No Pain  Slump test: No Pain  Piriformis Stretch Test:  negative    Right Hip Examination:    Skin is warm and dry to touch with no wounds, erythema, increased warmth, or ecchymosis   No dislocation / gross deformity  Tenderness: + Greater trochanter  ROM: Full  Strength: 5/5 throughout    Log roll test:  No Pain  STEFANY (Alex's):  Pain  FADIR test: No Pain    Bilateral supine SLR:  No Pain  Slump test: No Pain  Piriformis Stretch Test:  negative      Riccardo Marie MD     Large joint arthrocentesis: R greater trochanteric bursa  Universal Protocol:  Procedure performed by: (Supervision by Dr Karl Hicks)  Consent: Verbal consent obtained. Risks and benefits: risks, benefits and alternatives were discussed  Consent given by: patient  Patient understanding: patient states understanding of the procedure being performed  Patient consent: the patient's understanding of the procedure matches consent given  Site marked: the operative site was marked  Radiology Images displayed and confirmed.  If images not available, report reviewed: imaging studies available  Required items: required blood products, implants, devices, and special equipment available  Patient identity confirmed: verbally with patient    Supporting Documentation  Indications: pain   Procedure Details  Location: hip - R greater trochanteric bursa  Preparation: Patient was prepped and draped in the usual sterile fashion  Needle size: 22 G (22G-3in needle)  Ultrasound guidance: no  Approach: anterolateral  Medications administered: 4 mL bupivacaine 0.25 %; 40 mg triamcinolone acetonide 40 mg/mL    Patient tolerance: patient tolerated the procedure well with no immediate complications  Dressing:  Sterile dressing applied

## 2023-09-12 ENCOUNTER — TELEPHONE (OUTPATIENT)
Dept: FAMILY MEDICINE CLINIC | Facility: CLINIC | Age: 48
End: 2023-09-12

## 2023-10-11 DIAGNOSIS — G47.00 INSOMNIA, UNSPECIFIED TYPE: ICD-10-CM

## 2023-10-11 RX ORDER — TRAZODONE HYDROCHLORIDE 150 MG/1
150 TABLET ORAL
Qty: 90 TABLET | Refills: 1 | Status: SHIPPED | OUTPATIENT
Start: 2023-10-11

## 2023-10-24 ENCOUNTER — NURSE TRIAGE (OUTPATIENT)
Dept: OTHER | Facility: OTHER | Age: 48
End: 2023-10-24

## 2023-10-24 DIAGNOSIS — G43.909 MIGRAINE WITHOUT STATUS MIGRAINOSUS, NOT INTRACTABLE, UNSPECIFIED MIGRAINE TYPE: Primary | ICD-10-CM

## 2023-10-24 RX ORDER — RIZATRIPTAN BENZOATE 5 MG/1
5 TABLET, ORALLY DISINTEGRATING ORAL AS NEEDED
Qty: 14 TABLET | Refills: 0 | Status: SHIPPED | OUTPATIENT
Start: 2023-10-24 | End: 2023-10-31

## 2023-10-24 NOTE — TELEPHONE ENCOUNTER
Reason for Disposition   [1] Caller requesting a prescription renewal (no refills left), no triage required, AND [2] triager able to renew prescription per department policy    Answer Assessment - Initial Assessment Questions  1. NAME of MEDICATION: "What medicine are you calling about?"      Trazadone and Rizatriptan  2. QUESTION: "What is your question?" (e.g., medication refill, side effect)      Need refill    Protocols used: Medication Question Call-ADULT-    Made pt aware Trazadone was sent to pharmacy on 10/11 for 90 day supply and 1 refill. Per after hours standing order protocol, 7 day courtesy refill of Rizatriptan sent.

## 2023-10-24 NOTE — TELEPHONE ENCOUNTER
Regarding: urgent med refill (Rizatriptan / TraZODone)  ----- Message from Jillian Palma sent at 10/24/2023  6:14 PM EDT -----  Medication Refill Request     Name traZODone (DESYREL) 150 mg tablet [8084]  Dose/Frequency TAKE 1 TABLET BY MOUTH AT BEDTIME  [id number] tablet   Verified pharmacy  x  Verified ordering Provider  x  Does patient have enough for the next 3 days? No     Medication Refill Request     Name   rizatriptan (MAXALT-MLT) 5 mg disintegrating tablet [[id number]]     Dose/Frequency  DISSOLVE 1 TABLET IN MOUTH AS NEEDED FOR MIGRAINE, IF SYMPTOMS CONTINUE OR RETURN, MAY TAKE ANOTHER DOSE AT LEAST 2 HOURS AFTER FIRST DOSE. TAKE NO MORE THAN 2 DOSES IN A DAY. Quantity 9 tablet   Verified pharmacy  x  Verified ordering Provider  x  Does patient have enough for the next 3 days?   No

## 2023-11-05 DIAGNOSIS — G43.909 MIGRAINE WITHOUT STATUS MIGRAINOSUS, NOT INTRACTABLE, UNSPECIFIED MIGRAINE TYPE: ICD-10-CM

## 2023-11-07 RX ORDER — TOPIRAMATE 50 MG/1
TABLET, FILM COATED ORAL
Qty: 180 TABLET | Refills: 1 | Status: SHIPPED | OUTPATIENT
Start: 2023-11-07

## 2023-12-26 DIAGNOSIS — E66.9 OBESITY (BMI 30.0-34.9): ICD-10-CM

## 2024-01-18 DIAGNOSIS — G89.29 CHRONIC MIDLINE LOW BACK PAIN WITH SCIATICA, SCIATICA LATERALITY UNSPECIFIED: ICD-10-CM

## 2024-01-18 DIAGNOSIS — M54.40 CHRONIC MIDLINE LOW BACK PAIN WITH SCIATICA, SCIATICA LATERALITY UNSPECIFIED: ICD-10-CM

## 2024-01-18 RX ORDER — TIZANIDINE 4 MG/1
4 TABLET ORAL
Qty: 90 TABLET | Refills: 1 | Status: SHIPPED | OUTPATIENT
Start: 2024-01-18

## 2024-04-16 DIAGNOSIS — G47.00 INSOMNIA, UNSPECIFIED TYPE: ICD-10-CM

## 2024-04-16 RX ORDER — TRAZODONE HYDROCHLORIDE 150 MG/1
150 TABLET ORAL
Qty: 90 TABLET | Refills: 0 | Status: SHIPPED | OUTPATIENT
Start: 2024-04-16

## 2024-07-12 DIAGNOSIS — M54.40 CHRONIC MIDLINE LOW BACK PAIN WITH SCIATICA, SCIATICA LATERALITY UNSPECIFIED: ICD-10-CM

## 2024-07-12 DIAGNOSIS — G89.29 CHRONIC MIDLINE LOW BACK PAIN WITH SCIATICA, SCIATICA LATERALITY UNSPECIFIED: ICD-10-CM

## 2024-07-12 DIAGNOSIS — E66.9 OBESITY (BMI 30.0-34.9): ICD-10-CM

## 2024-07-12 RX ORDER — TIZANIDINE 4 MG/1
4 TABLET ORAL
Qty: 90 TABLET | Refills: 1 | OUTPATIENT
Start: 2024-07-12

## 2024-07-13 DIAGNOSIS — G47.00 INSOMNIA, UNSPECIFIED TYPE: ICD-10-CM

## 2024-07-15 RX ORDER — TRAZODONE HYDROCHLORIDE 150 MG/1
150 TABLET ORAL
Qty: 90 TABLET | Refills: 0 | OUTPATIENT
Start: 2024-07-15

## 2024-07-15 NOTE — TELEPHONE ENCOUNTER
Patient is calling wanting for 1 more refill because she moved to [place] and has not found a clinician.    She insists on getting a refill    Patient can be reached at [phone]

## 2024-07-29 DIAGNOSIS — G47.00 INSOMNIA, UNSPECIFIED TYPE: ICD-10-CM

## 2024-07-29 DIAGNOSIS — G43.909 MIGRAINE WITHOUT STATUS MIGRAINOSUS, NOT INTRACTABLE, UNSPECIFIED MIGRAINE TYPE: ICD-10-CM

## 2024-07-29 DIAGNOSIS — M54.40 CHRONIC MIDLINE LOW BACK PAIN WITH SCIATICA, SCIATICA LATERALITY UNSPECIFIED: ICD-10-CM

## 2024-07-29 DIAGNOSIS — G89.29 CHRONIC MIDLINE LOW BACK PAIN WITH SCIATICA, SCIATICA LATERALITY UNSPECIFIED: ICD-10-CM

## 2024-07-29 DIAGNOSIS — E66.9 OBESITY (BMI 30.0-34.9): ICD-10-CM

## 2024-07-29 RX ORDER — TRAZODONE HYDROCHLORIDE 150 MG/1
150 TABLET ORAL
Qty: 90 TABLET | Refills: 0 | OUTPATIENT
Start: 2024-07-29

## 2024-07-29 RX ORDER — TOPIRAMATE 50 MG/1
50 TABLET, FILM COATED ORAL 2 TIMES DAILY
Qty: 180 TABLET | Refills: 1 | OUTPATIENT
Start: 2024-07-29

## 2024-07-29 RX ORDER — RIZATRIPTAN BENZOATE 5 MG/1
TABLET, ORALLY DISINTEGRATING ORAL
Qty: 9 TABLET | Refills: 0 | OUTPATIENT
Start: 2024-07-29

## 2024-07-29 RX ORDER — TIZANIDINE 4 MG/1
4 TABLET ORAL
Qty: 90 TABLET | Refills: 1 | OUTPATIENT
Start: 2024-07-29

## 2024-07-29 NOTE — TELEPHONE ENCOUNTER
Patient is calling from [place] [facility]   These refilled  Trazodone  Tizanidine  Topiramate   Metformi  Rizatriptan (MAXALT)  Medications can be sent to [facility]  New PCP in [place] is 10/25th  SAMRA Higgins     Patient does not have any medications  Patient can be reached at [phone]

## 2024-07-30 NOTE — TELEPHONE ENCOUNTER
Patient called to ask if 30 day emergency refill can be ordered while she is searching for an earlier PCP appointment in [place].

## (undated) DEVICE — HYDROGEN PEROXIDE 3 PCT 16 OZ

## (undated) DEVICE — CUFF TOURNIQUET DISP SZ18

## (undated) DEVICE — UNDERGLOVE PROTEXIS  BLUE SZ 7

## (undated) DEVICE — 3M™ STERI-STRIP™ REINFORCED ADHESIVE SKIN CLOSURES, R1546, 1/4 IN X 4 IN (6 MM X 100 MM), 10 STRIPS/ENVELOPE: Brand: 3M™ STERI-STRIP™

## (undated) DEVICE — ACE WRAP 2 IN STERILE

## (undated) DEVICE — CABLE BIPOLAR DISP MEGADYNE

## (undated) DEVICE — NEEDLE 25G X 5/8 SAFETY

## (undated) DEVICE — SUT PROLENE 4-0 PS-2 18 IN 8682G

## (undated) DEVICE — ALCON SURGICAL BLADE 64: Brand: ALCON

## (undated) DEVICE — STOCKINETTE 2P PREROLLD 4X48

## (undated) DEVICE — SUT MONOCRYL 4-0 PS-2 27 IN Y426H

## (undated) DEVICE — STERILE POLYISOPRENE POWDER-FREE SURGICAL GLOVES: Brand: PROTEXIS

## (undated) DEVICE — KERLIX BANDAGE ROLL: Brand: KERLIX

## (undated) DEVICE — GAUZE ROLL,3 PLY: Brand: DERMACEA

## (undated) DEVICE — SUT VICRYL 3-0 SH 27 IN J416H

## (undated) DEVICE — STERILE POLYISOPRENE POWDER-FREE SURGICAL GLOVES WITH EMOLLIENT COATING: Brand: PROTEXIS

## (undated) DEVICE — DRAPE SHEET THREE QUARTER

## (undated) DEVICE — NEEDLE BLUNT 18 G X 1 1/2IN

## (undated) DEVICE — BUCKET PLASTER DISPOSABLE

## (undated) DEVICE — SYRINGE 30ML LL

## (undated) DEVICE — CHLORAPREP HI-LITE 26ML ORANGE

## (undated) DEVICE — SPLINT 3 IN X 15 FT DYNACAST S

## (undated) DEVICE — ACE WRAP 3 IN STERILE

## (undated) DEVICE — CURITY NON-ADHERENT STRIPS: Brand: CURITY

## (undated) DEVICE — SMARTGOWN SURGICAL GOWN, LARGE: Brand: CONVERTORS

## (undated) DEVICE — BETHLEHEM UNIVERSAL  MIONR EXT: Brand: CARDINAL HEALTH

## (undated) DEVICE — GAUZE SPONGES,16 PLY: Brand: CURITY

## (undated) DEVICE — GLOVE SURG DERMASSURE LF 6.5

## (undated) DEVICE — 3M™ MICROFOAM™ SURGICAL TAPE 4 ROLLS/CARTON 6 CARTONS/CASE 1528-3: Brand: 3M™ MICROFOAM™

## (undated) DEVICE — INTENDED FOR TISSUE SEPARATION, AND OTHER PROCEDURES THAT REQUIRE A SHARP SURGICAL BLADE TO PUNCTURE OR CUT.: Brand: BARD-PARKER SAFETY BLADES SIZE 15, STERILE